# Patient Record
Sex: MALE | Race: WHITE | HISPANIC OR LATINO | Employment: OTHER | URBAN - METROPOLITAN AREA
[De-identification: names, ages, dates, MRNs, and addresses within clinical notes are randomized per-mention and may not be internally consistent; named-entity substitution may affect disease eponyms.]

---

## 2017-01-16 ENCOUNTER — ALLSCRIPTS OFFICE VISIT (OUTPATIENT)
Dept: OTHER | Facility: OTHER | Age: 57
End: 2017-01-16

## 2017-02-01 ENCOUNTER — ALLSCRIPTS OFFICE VISIT (OUTPATIENT)
Dept: OTHER | Facility: OTHER | Age: 57
End: 2017-02-01

## 2017-03-20 ENCOUNTER — ALLSCRIPTS OFFICE VISIT (OUTPATIENT)
Dept: OTHER | Facility: OTHER | Age: 57
End: 2017-03-20

## 2017-03-29 ENCOUNTER — ALLSCRIPTS OFFICE VISIT (OUTPATIENT)
Dept: OTHER | Facility: OTHER | Age: 57
End: 2017-03-29

## 2017-03-29 ENCOUNTER — TRANSCRIBE ORDERS (OUTPATIENT)
Dept: ADMINISTRATIVE | Facility: HOSPITAL | Age: 57
End: 2017-03-29

## 2017-03-29 DIAGNOSIS — E13.8 DIABETES MELLITUS OF OTHER TYPE WITH COMPLICATION: ICD-10-CM

## 2017-03-29 DIAGNOSIS — I25.119 ATHEROSCLEROSIS OF NATIVE CORONARY ARTERY WITH ANGINA PECTORIS, UNSPECIFIED WHETHER NATIVE OR TRANSPLANTED HEART (HCC): Primary | ICD-10-CM

## 2017-03-30 ENCOUNTER — APPOINTMENT (EMERGENCY)
Dept: RADIOLOGY | Facility: HOSPITAL | Age: 57
End: 2017-03-30
Payer: MEDICARE

## 2017-03-30 ENCOUNTER — HOSPITAL ENCOUNTER (EMERGENCY)
Facility: HOSPITAL | Age: 57
Discharge: HOME | End: 2017-03-30
Attending: EMERGENCY MEDICINE | Admitting: EMERGENCY MEDICINE
Payer: MEDICARE

## 2017-03-30 VITALS
WEIGHT: 248 LBS | SYSTOLIC BLOOD PRESSURE: 147 MMHG | TEMPERATURE: 97.8 F | HEIGHT: 66 IN | DIASTOLIC BLOOD PRESSURE: 82 MMHG | HEART RATE: 90 BPM | BODY MASS INDEX: 39.86 KG/M2 | RESPIRATION RATE: 20 BRPM | OXYGEN SATURATION: 96 %

## 2017-03-30 DIAGNOSIS — M54.9 BACK PAIN, CHRONIC: Primary | ICD-10-CM

## 2017-03-30 DIAGNOSIS — G89.29 BACK PAIN, CHRONIC: Primary | ICD-10-CM

## 2017-03-30 PROCEDURE — 96372 THER/PROPH/DIAG INJ SC/IM: CPT

## 2017-03-30 PROCEDURE — 72131 CT LUMBAR SPINE W/O DYE: CPT

## 2017-03-30 PROCEDURE — 99284 EMERGENCY DEPT VISIT MOD MDM: CPT

## 2017-03-30 RX ORDER — HYDROMORPHONE HCL 110MG/55ML
2 PATIENT CONTROLLED ANALGESIA SYRINGE INTRAVENOUS ONCE
Status: COMPLETED | OUTPATIENT
Start: 2017-03-30 | End: 2017-03-30

## 2017-03-30 RX ORDER — LOVASTATIN 40 MG/1
40 TABLET ORAL
COMMUNITY

## 2017-03-30 RX ORDER — OXYCODONE AND ACETAMINOPHEN 7.5; 325 MG/1; MG/1
1 TABLET ORAL EVERY 6 HOURS PRN
Status: ON HOLD | COMMUNITY
End: 2017-05-18 | Stop reason: ALTCHOICE

## 2017-03-30 RX ADMIN — HYDROMORPHONE HYDROCHLORIDE 2 MG: 2 INJECTION, SOLUTION INTRAMUSCULAR; INTRAVENOUS; SUBCUTANEOUS at 22:35

## 2017-04-27 ENCOUNTER — HOSPITAL ENCOUNTER (EMERGENCY)
Facility: HOSPITAL | Age: 57
Discharge: HOME | End: 2017-04-28
Attending: EMERGENCY MEDICINE
Payer: MEDICARE

## 2017-04-27 VITALS
HEIGHT: 66 IN | BODY MASS INDEX: 38.57 KG/M2 | WEIGHT: 240 LBS | HEART RATE: 91 BPM | RESPIRATION RATE: 24 BRPM | OXYGEN SATURATION: 95 % | SYSTOLIC BLOOD PRESSURE: 101 MMHG | TEMPERATURE: 99.9 F | DIASTOLIC BLOOD PRESSURE: 79 MMHG

## 2017-04-27 DIAGNOSIS — G89.29 BACK PAIN, CHRONIC: Primary | ICD-10-CM

## 2017-04-27 DIAGNOSIS — M54.9 BACK PAIN, CHRONIC: Primary | ICD-10-CM

## 2017-04-27 RX ORDER — HYDROMORPHONE HCL 110MG/55ML
2 PATIENT CONTROLLED ANALGESIA SYRINGE INTRAVENOUS ONCE
Status: COMPLETED | OUTPATIENT
Start: 2017-04-27 | End: 2017-04-28

## 2017-04-27 RX ORDER — GABAPENTIN 100 MG/1
100 CAPSULE ORAL ONCE
Status: COMPLETED | OUTPATIENT
Start: 2017-04-27 | End: 2017-04-28

## 2017-04-27 RX ORDER — GABAPENTIN 300 MG/1
300 CAPSULE ORAL 3 TIMES DAILY
Qty: 21 CAPSULE | Refills: 0 | Status: SHIPPED | OUTPATIENT
Start: 2017-04-27 | End: 2017-06-21

## 2017-04-28 PROCEDURE — 99283 EMERGENCY DEPT VISIT LOW MDM: CPT

## 2017-04-28 PROCEDURE — 96372 THER/PROPH/DIAG INJ SC/IM: CPT

## 2017-04-28 RX ADMIN — HYDROMORPHONE HYDROCHLORIDE 2 MG: 2 INJECTION, SOLUTION INTRAMUSCULAR; INTRAVENOUS; SUBCUTANEOUS at 00:00

## 2017-04-28 RX ADMIN — GABAPENTIN 100 MG: 100 CAPSULE ORAL at 00:00

## 2017-05-01 ENCOUNTER — APPOINTMENT (EMERGENCY)
Dept: RADIOLOGY | Facility: HOSPITAL | Age: 57
End: 2017-05-01
Payer: MEDICARE

## 2017-05-01 ENCOUNTER — HOSPITAL ENCOUNTER (EMERGENCY)
Facility: HOSPITAL | Age: 57
Discharge: HOME | End: 2017-05-01
Attending: EMERGENCY MEDICINE
Payer: MEDICARE

## 2017-05-01 VITALS
OXYGEN SATURATION: 97 % | BODY MASS INDEX: 37.77 KG/M2 | DIASTOLIC BLOOD PRESSURE: 82 MMHG | WEIGHT: 235 LBS | RESPIRATION RATE: 18 BRPM | HEIGHT: 66 IN | HEART RATE: 127 BPM | SYSTOLIC BLOOD PRESSURE: 118 MMHG | TEMPERATURE: 98.4 F

## 2017-05-01 DIAGNOSIS — R16.0 LIVER MASS: Primary | ICD-10-CM

## 2017-05-01 DIAGNOSIS — R91.1 PULMONARY NODULE: ICD-10-CM

## 2017-05-01 DIAGNOSIS — R10.9 FLANK PAIN: ICD-10-CM

## 2017-05-01 LAB
ALBUMIN SERPL BCP-MCNC: 3.5 G/DL (ref 3.5–5)
ALP SERPL-CCNC: 236 U/L (ref 46–116)
ALT SERPL W P-5'-P-CCNC: 139 U/L (ref 12–78)
ANION GAP SERPL CALCULATED.3IONS-SCNC: 14 MMOL/L (ref 4–13)
AST SERPL W P-5'-P-CCNC: 80 U/L (ref 5–45)
BACTERIA UR QL AUTO: ABNORMAL /HPF
BASOPHILS # BLD AUTO: 0.1 THOUSANDS/ΜL (ref 0–0.1)
BASOPHILS NFR BLD AUTO: 1 % (ref 0–1)
BILIRUB SERPL-MCNC: 0.5 MG/DL (ref 0.2–1)
BILIRUB UR QL STRIP: ABNORMAL
BUN SERPL-MCNC: 19 MG/DL (ref 5–25)
CALCIUM SERPL-MCNC: 9.9 MG/DL (ref 8.3–10.1)
CHLORIDE SERPL-SCNC: 98 MMOL/L (ref 100–108)
CLARITY UR: CLEAR
CO2 SERPL-SCNC: 26 MMOL/L (ref 21–32)
COLOR UR: ABNORMAL
CREAT SERPL-MCNC: 0.98 MG/DL (ref 0.6–1.3)
EOSINOPHIL # BLD AUTO: 0.4 THOUSAND/ΜL (ref 0–0.61)
EOSINOPHIL NFR BLD AUTO: 3 % (ref 0–6)
ERYTHROCYTE [DISTWIDTH] IN BLOOD BY AUTOMATED COUNT: 12.9 % (ref 11.6–15.1)
GFR SERPL CREATININE-BSD FRML MDRD: >60 ML/MIN/1.73SQ M
GLUCOSE SERPL-MCNC: 179 MG/DL (ref 65–140)
GLUCOSE UR STRIP-MCNC: ABNORMAL MG/DL
HCT VFR BLD AUTO: 48.4 % (ref 42–52)
HGB BLD-MCNC: 15.6 G/DL (ref 14–18)
HGB UR QL STRIP.AUTO: NEGATIVE
KETONES UR STRIP-MCNC: ABNORMAL MG/DL
LEUKOCYTE ESTERASE UR QL STRIP: NEGATIVE
LIPASE SERPL-CCNC: 184 U/L (ref 73–393)
LYMPHOCYTES # BLD AUTO: 3 THOUSANDS/ΜL (ref 0.6–4.47)
LYMPHOCYTES NFR BLD AUTO: 24 % (ref 14–44)
MCH RBC QN AUTO: 28.6 PG (ref 27–31)
MCHC RBC AUTO-ENTMCNC: 32.3 G/DL (ref 31.4–37.4)
MCV RBC AUTO: 89 FL (ref 82–98)
MONOCYTES # BLD AUTO: 1.2 THOUSAND/ΜL (ref 0.17–1.22)
MONOCYTES NFR BLD AUTO: 9 % (ref 4–12)
MUCOUS THREADS UR QL AUTO: ABNORMAL
NEUTROPHILS # BLD AUTO: 8.1 THOUSANDS/ΜL (ref 1.85–7.62)
NEUTS SEG NFR BLD AUTO: 64 % (ref 43–75)
NITRITE UR QL STRIP: NEGATIVE
NON-SQ EPI CELLS URNS QL MICRO: ABNORMAL /HPF
NRBC BLD AUTO-RTO: 0 /100 WBCS
OTHER STN SPEC: ABNORMAL
PH UR STRIP.AUTO: 6 [PH] (ref 5–9)
PLATELET # BLD AUTO: 400 THOUSANDS/UL (ref 130–400)
PMV BLD AUTO: 7.1 FL (ref 8.9–12.7)
POTASSIUM SERPL-SCNC: 3.9 MMOL/L (ref 3.5–5.3)
PROT SERPL-MCNC: 7.7 G/DL (ref 6.4–8.2)
PROT UR STRIP-MCNC: ABNORMAL MG/DL
RBC # BLD AUTO: 5.46 MILLION/UL (ref 4.7–6.1)
RBC #/AREA URNS AUTO: ABNORMAL /HPF
SODIUM SERPL-SCNC: 138 MMOL/L (ref 136–145)
SP GR UR STRIP.AUTO: 1.02 (ref 1–1.03)
TROPONIN I SERPL-MCNC: <0.02 NG/ML
UROBILINOGEN UR QL STRIP.AUTO: 4 E.U./DL
WBC # BLD AUTO: 12.6 THOUSAND/UL (ref 4.8–10.8)
WBC #/AREA URNS AUTO: ABNORMAL /HPF

## 2017-05-01 PROCEDURE — 99284 EMERGENCY DEPT VISIT MOD MDM: CPT

## 2017-05-01 PROCEDURE — 36415 COLL VENOUS BLD VENIPUNCTURE: CPT | Performed by: EMERGENCY MEDICINE

## 2017-05-01 PROCEDURE — 96374 THER/PROPH/DIAG INJ IV PUSH: CPT

## 2017-05-01 PROCEDURE — 96361 HYDRATE IV INFUSION ADD-ON: CPT

## 2017-05-01 PROCEDURE — 93005 ELECTROCARDIOGRAM TRACING: CPT | Performed by: EMERGENCY MEDICINE

## 2017-05-01 PROCEDURE — 83690 ASSAY OF LIPASE: CPT | Performed by: EMERGENCY MEDICINE

## 2017-05-01 PROCEDURE — 71275 CT ANGIOGRAPHY CHEST: CPT

## 2017-05-01 PROCEDURE — 96375 TX/PRO/DX INJ NEW DRUG ADDON: CPT

## 2017-05-01 PROCEDURE — 74176 CT ABD & PELVIS W/O CONTRAST: CPT

## 2017-05-01 PROCEDURE — 80053 COMPREHEN METABOLIC PANEL: CPT | Performed by: EMERGENCY MEDICINE

## 2017-05-01 PROCEDURE — 84484 ASSAY OF TROPONIN QUANT: CPT | Performed by: EMERGENCY MEDICINE

## 2017-05-01 PROCEDURE — 81001 URINALYSIS AUTO W/SCOPE: CPT | Performed by: EMERGENCY MEDICINE

## 2017-05-01 PROCEDURE — 85025 COMPLETE CBC W/AUTO DIFF WBC: CPT | Performed by: EMERGENCY MEDICINE

## 2017-05-01 RX ORDER — KETOROLAC TROMETHAMINE 30 MG/ML
30 INJECTION, SOLUTION INTRAMUSCULAR; INTRAVENOUS ONCE
Status: COMPLETED | OUTPATIENT
Start: 2017-05-01 | End: 2017-05-01

## 2017-05-01 RX ORDER — LEVOFLOXACIN 500 MG/1
500 TABLET, FILM COATED ORAL DAILY
Qty: 10 TABLET | Refills: 0 | Status: SHIPPED | OUTPATIENT
Start: 2017-05-01 | End: 2017-05-11

## 2017-05-01 RX ORDER — ONDANSETRON 2 MG/ML
4 INJECTION INTRAMUSCULAR; INTRAVENOUS ONCE
Status: COMPLETED | OUTPATIENT
Start: 2017-05-01 | End: 2017-05-01

## 2017-05-01 RX ADMIN — IOHEXOL 85 ML: 350 INJECTION, SOLUTION INTRAVENOUS at 06:30

## 2017-05-01 RX ADMIN — SODIUM CHLORIDE 1000 ML: 0.9 INJECTION, SOLUTION INTRAVENOUS at 03:40

## 2017-05-01 RX ADMIN — ONDANSETRON 4 MG: 2 INJECTION INTRAMUSCULAR; INTRAVENOUS at 03:41

## 2017-05-01 RX ADMIN — KETOROLAC TROMETHAMINE 30 MG: 30 INJECTION, SOLUTION INTRAMUSCULAR at 03:41

## 2017-05-01 RX ADMIN — HYDROMORPHONE HYDROCHLORIDE 1 MG: 1 INJECTION, SOLUTION INTRAMUSCULAR; INTRAVENOUS; SUBCUTANEOUS at 03:41

## 2017-05-02 ENCOUNTER — ALLSCRIPTS OFFICE VISIT (OUTPATIENT)
Dept: OTHER | Facility: OTHER | Age: 57
End: 2017-05-02

## 2017-05-05 LAB
ATRIAL RATE: 122 BPM
P AXIS: 46 DEGREES
PR INTERVAL: 118 MS
QRS AXIS: 27 DEGREES
QRSD INTERVAL: 90 MS
QT INTERVAL: 328 MS
QTC INTERVAL: 467 MS
T WAVE AXIS: 33 DEGREES
VENTRICULAR RATE: 122 BPM

## 2017-05-09 ENCOUNTER — ALLSCRIPTS OFFICE VISIT (OUTPATIENT)
Dept: OTHER | Facility: OTHER | Age: 57
End: 2017-05-09

## 2017-05-11 ENCOUNTER — GENERIC CONVERSION - ENCOUNTER (OUTPATIENT)
Dept: OTHER | Facility: OTHER | Age: 57
End: 2017-05-11

## 2017-05-17 ENCOUNTER — ALLSCRIPTS OFFICE VISIT (OUTPATIENT)
Dept: OTHER | Facility: OTHER | Age: 57
End: 2017-05-17

## 2017-05-18 ENCOUNTER — HOSPITAL ENCOUNTER (INPATIENT)
Facility: HOSPITAL | Age: 57
LOS: 1 days | Discharge: HOME | DRG: 948 | End: 2017-05-19
Attending: EMERGENCY MEDICINE | Admitting: FAMILY MEDICINE
Payer: MEDICARE

## 2017-05-18 ENCOUNTER — APPOINTMENT (EMERGENCY)
Dept: RADIOLOGY | Facility: HOSPITAL | Age: 57
DRG: 948 | End: 2017-05-18
Payer: MEDICARE

## 2017-05-18 DIAGNOSIS — R07.9 CHEST PAIN: Primary | ICD-10-CM

## 2017-05-18 DIAGNOSIS — R16.0 MASS OF MULTIPLE SITES OF LIVER: ICD-10-CM

## 2017-05-18 PROBLEM — M48.00 SPINAL STENOSIS: Status: ACTIVE | Noted: 2017-05-18

## 2017-05-18 PROBLEM — Z72.0 TOBACCO USE: Status: ACTIVE | Noted: 2017-05-18

## 2017-05-18 PROBLEM — F32.A ANXIETY AND DEPRESSION: Status: ACTIVE | Noted: 2017-05-18

## 2017-05-18 PROBLEM — R10.11 RUQ PAIN: Status: ACTIVE | Noted: 2017-05-18

## 2017-05-18 PROBLEM — F41.9 ANXIETY AND DEPRESSION: Status: ACTIVE | Noted: 2017-05-18

## 2017-05-18 PROBLEM — K21.9 GERD (GASTROESOPHAGEAL REFLUX DISEASE): Status: ACTIVE | Noted: 2017-05-18

## 2017-05-18 PROBLEM — N40.0 BPH (BENIGN PROSTATIC HYPERPLASIA): Status: ACTIVE | Noted: 2017-05-18

## 2017-05-18 PROBLEM — R91.8 MULTIPLE LUNG NODULES ON CT: Status: ACTIVE | Noted: 2017-05-18

## 2017-05-18 LAB
ANION GAP SERPL CALCULATED.3IONS-SCNC: 12 MMOL/L (ref 4–13)
ANION GAP SERPL CALCULATED.3IONS-SCNC: 9 MMOL/L (ref 4–13)
BASOPHILS # BLD AUTO: 0 THOUSANDS/ΜL (ref 0–0.1)
BASOPHILS NFR BLD AUTO: 0 % (ref 0–1)
BUN SERPL-MCNC: 11 MG/DL (ref 5–25)
BUN SERPL-MCNC: 9 MG/DL (ref 5–25)
CALCIUM SERPL-MCNC: 8.7 MG/DL (ref 8.3–10.1)
CALCIUM SERPL-MCNC: 8.8 MG/DL (ref 8.3–10.1)
CHLORIDE SERPL-SCNC: 102 MMOL/L (ref 100–108)
CHLORIDE SERPL-SCNC: 103 MMOL/L (ref 100–108)
CO2 SERPL-SCNC: 26 MMOL/L (ref 21–32)
CO2 SERPL-SCNC: 29 MMOL/L (ref 21–32)
CREAT SERPL-MCNC: 0.65 MG/DL (ref 0.6–1.3)
CREAT SERPL-MCNC: 0.74 MG/DL (ref 0.6–1.3)
EOSINOPHIL # BLD AUTO: 0.4 THOUSAND/ΜL (ref 0–0.61)
EOSINOPHIL NFR BLD AUTO: 4 % (ref 0–6)
ERYTHROCYTE [DISTWIDTH] IN BLOOD BY AUTOMATED COUNT: 13.3 % (ref 11.6–15.1)
ERYTHROCYTE [DISTWIDTH] IN BLOOD BY AUTOMATED COUNT: 13.3 % (ref 11.6–15.1)
EST. AVERAGE GLUCOSE BLD GHB EST-MCNC: 206 MG/DL
EXT BLOOD URINE: NORMAL
EXT GLUCOSE, UA: NORMAL
EXT KETONES: NORMAL
EXT PH, UA: 7
EXT PROTEIN, UA: NORMAL
GFR SERPL CREATININE-BSD FRML MDRD: >60 ML/MIN/1.73SQ M
GFR SERPL CREATININE-BSD FRML MDRD: >60 ML/MIN/1.73SQ M
GLUCOSE SERPL-MCNC: 105 MG/DL (ref 65–140)
GLUCOSE SERPL-MCNC: 169 MG/DL (ref 65–140)
GLUCOSE SERPL-MCNC: 205 MG/DL (ref 65–140)
GLUCOSE SERPL-MCNC: 95 MG/DL (ref 65–140)
HBA1C MFR BLD: 8.8 % (ref 4.2–6.3)
HCT VFR BLD AUTO: 37.2 % (ref 42–52)
HCT VFR BLD AUTO: 38 % (ref 42–52)
HGB BLD-MCNC: 12.1 G/DL (ref 14–18)
HGB BLD-MCNC: 12.3 G/DL (ref 14–18)
LYMPHOCYTES # BLD AUTO: 2.3 THOUSANDS/ΜL (ref 0.6–4.47)
LYMPHOCYTES NFR BLD AUTO: 26 % (ref 14–44)
MAGNESIUM SERPL-MCNC: 1.7 MG/DL (ref 1.6–2.6)
MCH RBC QN AUTO: 28.2 PG (ref 27–31)
MCH RBC QN AUTO: 28.5 PG (ref 27–31)
MCHC RBC AUTO-ENTMCNC: 32.4 G/DL (ref 31.4–37.4)
MCHC RBC AUTO-ENTMCNC: 32.5 G/DL (ref 31.4–37.4)
MCV RBC AUTO: 87 FL (ref 82–98)
MCV RBC AUTO: 88 FL (ref 82–98)
MONOCYTES # BLD AUTO: 0.8 THOUSAND/ΜL (ref 0.17–1.22)
MONOCYTES NFR BLD AUTO: 9 % (ref 4–12)
NEUTROPHILS # BLD AUTO: 5.6 THOUSANDS/ΜL (ref 1.85–7.62)
NEUTS SEG NFR BLD AUTO: 61 % (ref 43–75)
NRBC BLD AUTO-RTO: 0 /100 WBCS
NT-PROBNP SERPL-MCNC: 64 PG/ML
PHOSPHATE SERPL-MCNC: 3.6 MG/DL (ref 2.7–4.5)
PLATELET # BLD AUTO: 281 THOUSANDS/UL (ref 130–400)
PLATELET # BLD AUTO: 288 THOUSANDS/UL (ref 130–400)
PMV BLD AUTO: 7.1 FL (ref 8.9–12.7)
PMV BLD AUTO: 7.2 FL (ref 8.9–12.7)
POTASSIUM SERPL-SCNC: 3.8 MMOL/L (ref 3.5–5.3)
POTASSIUM SERPL-SCNC: 3.9 MMOL/L (ref 3.5–5.3)
RBC # BLD AUTO: 4.25 MILLION/UL (ref 4.7–6.1)
RBC # BLD AUTO: 4.36 MILLION/UL (ref 4.7–6.1)
SODIUM SERPL-SCNC: 140 MMOL/L (ref 136–145)
SODIUM SERPL-SCNC: 141 MMOL/L (ref 136–145)
TROPONIN I SERPL-MCNC: <0.02 NG/ML
WBC # BLD AUTO: 9.1 THOUSAND/UL (ref 4.8–10.8)
WBC # BLD AUTO: 9.1 THOUSAND/UL (ref 4.8–10.8)
WBC # BLD EST: NORMAL 10*3/UL

## 2017-05-18 PROCEDURE — 83880 ASSAY OF NATRIURETIC PEPTIDE: CPT | Performed by: EMERGENCY MEDICINE

## 2017-05-18 PROCEDURE — 84484 ASSAY OF TROPONIN QUANT: CPT | Performed by: EMERGENCY MEDICINE

## 2017-05-18 PROCEDURE — 80048 BASIC METABOLIC PNL TOTAL CA: CPT | Performed by: FAMILY MEDICINE

## 2017-05-18 PROCEDURE — 80048 BASIC METABOLIC PNL TOTAL CA: CPT | Performed by: EMERGENCY MEDICINE

## 2017-05-18 PROCEDURE — 85025 COMPLETE CBC W/AUTO DIFF WBC: CPT | Performed by: EMERGENCY MEDICINE

## 2017-05-18 PROCEDURE — 83735 ASSAY OF MAGNESIUM: CPT | Performed by: FAMILY MEDICINE

## 2017-05-18 PROCEDURE — 94660 CPAP INITIATION&MGMT: CPT

## 2017-05-18 PROCEDURE — 96374 THER/PROPH/DIAG INJ IV PUSH: CPT

## 2017-05-18 PROCEDURE — 85027 COMPLETE CBC AUTOMATED: CPT | Performed by: FAMILY MEDICINE

## 2017-05-18 PROCEDURE — 83036 HEMOGLOBIN GLYCOSYLATED A1C: CPT | Performed by: FAMILY MEDICINE

## 2017-05-18 PROCEDURE — 71010 HB CHEST X-RAY 1 VIEW FRONTAL (PORTABLE): CPT

## 2017-05-18 PROCEDURE — 81002 URINALYSIS NONAUTO W/O SCOPE: CPT | Performed by: EMERGENCY MEDICINE

## 2017-05-18 PROCEDURE — 94760 N-INVAS EAR/PLS OXIMETRY 1: CPT

## 2017-05-18 PROCEDURE — 99285 EMERGENCY DEPT VISIT HI MDM: CPT

## 2017-05-18 PROCEDURE — 36415 COLL VENOUS BLD VENIPUNCTURE: CPT | Performed by: EMERGENCY MEDICINE

## 2017-05-18 PROCEDURE — 84484 ASSAY OF TROPONIN QUANT: CPT | Performed by: FAMILY MEDICINE

## 2017-05-18 PROCEDURE — 87081 CULTURE SCREEN ONLY: CPT | Performed by: FAMILY MEDICINE

## 2017-05-18 PROCEDURE — 84100 ASSAY OF PHOSPHORUS: CPT | Performed by: FAMILY MEDICINE

## 2017-05-18 PROCEDURE — 82948 REAGENT STRIP/BLOOD GLUCOSE: CPT

## 2017-05-18 PROCEDURE — 93005 ELECTROCARDIOGRAM TRACING: CPT

## 2017-05-18 PROCEDURE — 71275 CT ANGIOGRAPHY CHEST: CPT

## 2017-05-18 RX ORDER — ALBUTEROL SULFATE 90 UG/1
1 AEROSOL, METERED RESPIRATORY (INHALATION) EVERY 4 HOURS PRN
Status: DISCONTINUED | OUTPATIENT
Start: 2017-05-18 | End: 2017-05-18

## 2017-05-18 RX ORDER — OXYCODONE HYDROCHLORIDE 5 MG/1
7.25 TABLET ORAL EVERY 8 HOURS PRN
Status: DISCONTINUED | OUTPATIENT
Start: 2017-05-18 | End: 2017-05-18 | Stop reason: CLARIF

## 2017-05-18 RX ORDER — MORPHINE SULFATE 10 MG/ML
6 INJECTION, SOLUTION INTRAMUSCULAR; INTRAVENOUS ONCE
Status: COMPLETED | OUTPATIENT
Start: 2017-05-18 | End: 2017-05-18

## 2017-05-18 RX ORDER — CLOPIDOGREL BISULFATE 75 MG/1
75 TABLET ORAL DAILY
Status: DISCONTINUED | OUTPATIENT
Start: 2017-05-18 | End: 2017-05-19 | Stop reason: HOSPADM

## 2017-05-18 RX ORDER — GLIPIZIDE 5 MG/1
10 TABLET ORAL
Status: DISCONTINUED | OUTPATIENT
Start: 2017-05-18 | End: 2017-05-19 | Stop reason: HOSPADM

## 2017-05-18 RX ORDER — NITROGLYCERIN 0.4 MG/1
0.4 TABLET SUBLINGUAL
Status: DISCONTINUED | OUTPATIENT
Start: 2017-05-18 | End: 2017-05-19 | Stop reason: HOSPADM

## 2017-05-18 RX ORDER — PRAVASTATIN SODIUM 40 MG
40 TABLET ORAL
Status: DISCONTINUED | OUTPATIENT
Start: 2017-05-18 | End: 2017-05-19 | Stop reason: HOSPADM

## 2017-05-18 RX ORDER — OXYCODONE HYDROCHLORIDE 5 MG/1
5 CAPSULE ORAL EVERY 4 HOURS PRN
Status: ON HOLD | COMMUNITY
End: 2017-05-19

## 2017-05-18 RX ORDER — OXYCODONE HYDROCHLORIDE 5 MG/1
7.5 TABLET ORAL EVERY 8 HOURS PRN
Status: DISCONTINUED | OUTPATIENT
Start: 2017-05-18 | End: 2017-05-19 | Stop reason: HOSPADM

## 2017-05-18 RX ORDER — FAMOTIDINE 20 MG/1
40 TABLET, FILM COATED ORAL DAILY
Status: DISCONTINUED | OUTPATIENT
Start: 2017-05-18 | End: 2017-05-19 | Stop reason: HOSPADM

## 2017-05-18 RX ORDER — TAMSULOSIN HYDROCHLORIDE 0.4 MG/1
0.4 CAPSULE ORAL
Status: DISCONTINUED | OUTPATIENT
Start: 2017-05-18 | End: 2017-05-19 | Stop reason: HOSPADM

## 2017-05-18 RX ORDER — ALBUTEROL SULFATE 2.5 MG/3ML
2.5 SOLUTION RESPIRATORY (INHALATION) EVERY 4 HOURS PRN
Status: DISCONTINUED | OUTPATIENT
Start: 2017-05-18 | End: 2017-05-19 | Stop reason: HOSPADM

## 2017-05-18 RX ORDER — PAROXETINE HYDROCHLORIDE 20 MG/1
30 TABLET, FILM COATED ORAL DAILY
Status: DISCONTINUED | OUTPATIENT
Start: 2017-05-18 | End: 2017-05-19 | Stop reason: HOSPADM

## 2017-05-18 RX ORDER — OXYCODONE HYDROCHLORIDE 5 MG/1
2.5 TABLET ORAL EVERY 6 HOURS PRN
Status: DISCONTINUED | OUTPATIENT
Start: 2017-05-18 | End: 2017-05-18

## 2017-05-18 RX ORDER — MORPHINE SULFATE 2 MG/ML
1 INJECTION, SOLUTION INTRAMUSCULAR; INTRAVENOUS ONCE
Status: COMPLETED | OUTPATIENT
Start: 2017-05-18 | End: 2017-05-18

## 2017-05-18 RX ORDER — TAMSULOSIN HYDROCHLORIDE 0.4 MG/1
0.4 CAPSULE ORAL EVERY MORNING
COMMUNITY

## 2017-05-18 RX ADMIN — ENOXAPARIN SODIUM 40 MG: 40 INJECTION SUBCUTANEOUS at 08:53

## 2017-05-18 RX ADMIN — OXYCODONE HYDROCHLORIDE 7.5 MG: 5 TABLET ORAL at 04:19

## 2017-05-18 RX ADMIN — INSULIN DETEMIR 40 UNITS: 100 INJECTION, SOLUTION SUBCUTANEOUS at 21:48

## 2017-05-18 RX ADMIN — GLIPIZIDE 10 MG: 5 TABLET ORAL at 08:52

## 2017-05-18 RX ADMIN — IOHEXOL 85 ML: 350 INJECTION, SOLUTION INTRAVENOUS at 01:58

## 2017-05-18 RX ADMIN — OXYCODONE HYDROCHLORIDE 2.5 MG: 5 TABLET ORAL at 18:50

## 2017-05-18 RX ADMIN — PRAVASTATIN SODIUM 40 MG: 40 TABLET ORAL at 17:20

## 2017-05-18 RX ADMIN — PAROXETINE HYDROCHLORIDE 30 MG: 20 TABLET, FILM COATED ORAL at 08:52

## 2017-05-18 RX ADMIN — MORPHINE SULFATE 1 MG: 2 INJECTION, SOLUTION INTRAMUSCULAR; INTRAVENOUS at 19:04

## 2017-05-18 RX ADMIN — TAMSULOSIN HYDROCHLORIDE 0.4 MG: 0.4 CAPSULE ORAL at 17:20

## 2017-05-18 RX ADMIN — OXYCODONE HYDROCHLORIDE 7.5 MG: 5 TABLET ORAL at 13:31

## 2017-05-18 RX ADMIN — FAMOTIDINE 40 MG: 20 TABLET ORAL at 08:52

## 2017-05-18 RX ADMIN — MORPHINE SULFATE 6 MG: 10 INJECTION, SOLUTION INTRAMUSCULAR; INTRAVENOUS at 00:49

## 2017-05-18 RX ADMIN — OXYCODONE HYDROCHLORIDE 7.5 MG: 5 TABLET ORAL at 21:48

## 2017-05-18 RX ADMIN — GLIPIZIDE 10 MG: 5 TABLET ORAL at 17:20

## 2017-05-18 RX ADMIN — CLOPIDOGREL BISULFATE 75 MG: 75 TABLET ORAL at 08:52

## 2017-05-19 ENCOUNTER — APPOINTMENT (INPATIENT)
Dept: NON INVASIVE DIAGNOSTICS | Facility: HOSPITAL | Age: 57
DRG: 948 | End: 2017-05-19
Payer: MEDICARE

## 2017-05-19 ENCOUNTER — APPOINTMENT (INPATIENT)
Dept: RADIOLOGY | Facility: HOSPITAL | Age: 57
DRG: 948 | End: 2017-05-19
Payer: MEDICARE

## 2017-05-19 VITALS
WEIGHT: 248 LBS | SYSTOLIC BLOOD PRESSURE: 133 MMHG | TEMPERATURE: 99.1 F | DIASTOLIC BLOOD PRESSURE: 71 MMHG | OXYGEN SATURATION: 92 % | HEART RATE: 109 BPM | BODY MASS INDEX: 39.86 KG/M2 | RESPIRATION RATE: 18 BRPM | HEIGHT: 66 IN

## 2017-05-19 LAB
ANION GAP SERPL CALCULATED.3IONS-SCNC: 10 MMOL/L (ref 4–13)
BASOPHILS # BLD AUTO: 0 THOUSANDS/ΜL (ref 0–0.1)
BASOPHILS NFR BLD AUTO: 0 % (ref 0–1)
BUN SERPL-MCNC: 6 MG/DL (ref 5–25)
CALCIUM SERPL-MCNC: 9.4 MG/DL (ref 8.3–10.1)
CEA SERPL-MCNC: 90.2 NG/ML (ref 0–3)
CHLORIDE SERPL-SCNC: 100 MMOL/L (ref 100–108)
CO2 SERPL-SCNC: 29 MMOL/L (ref 21–32)
CREAT SERPL-MCNC: 0.65 MG/DL (ref 0.6–1.3)
EOSINOPHIL # BLD AUTO: 0.3 THOUSAND/ΜL (ref 0–0.61)
EOSINOPHIL NFR BLD AUTO: 4 % (ref 0–6)
ERYTHROCYTE [DISTWIDTH] IN BLOOD BY AUTOMATED COUNT: 13 % (ref 11.6–15.1)
GFR SERPL CREATININE-BSD FRML MDRD: >60 ML/MIN/1.73SQ M
GLUCOSE SERPL-MCNC: 186 MG/DL (ref 65–140)
GLUCOSE SERPL-MCNC: 201 MG/DL (ref 65–140)
GLUCOSE SERPL-MCNC: 204 MG/DL (ref 65–140)
GLUCOSE SERPL-MCNC: 205 MG/DL (ref 65–140)
GLUCOSE SERPL-MCNC: 209 MG/DL (ref 65–140)
HCT VFR BLD AUTO: 38.9 % (ref 42–52)
HGB BLD-MCNC: 12.9 G/DL (ref 14–18)
LYMPHOCYTES # BLD AUTO: 1.5 THOUSANDS/ΜL (ref 0.6–4.47)
LYMPHOCYTES NFR BLD AUTO: 18 % (ref 14–44)
MAGNESIUM SERPL-MCNC: 1.6 MG/DL (ref 1.6–2.6)
MAX DIASTOLIC BP: 74 MMHG
MAX HEART RATE: 133 BPM
MAX PREDICTED HEART RATE: 164 BPM
MAX. SYSTOLIC BP: 130 MMHG
MCH RBC QN AUTO: 28.7 PG (ref 27–31)
MCHC RBC AUTO-ENTMCNC: 33.1 G/DL (ref 31.4–37.4)
MCV RBC AUTO: 87 FL (ref 82–98)
MONOCYTES # BLD AUTO: 0.8 THOUSAND/ΜL (ref 0.17–1.22)
MONOCYTES NFR BLD AUTO: 9 % (ref 4–12)
MRSA NOSE QL CULT: NORMAL
NEUTROPHILS # BLD AUTO: 5.7 THOUSANDS/ΜL (ref 1.85–7.62)
NEUTS SEG NFR BLD AUTO: 69 % (ref 43–75)
NRBC BLD AUTO-RTO: 0 /100 WBCS
PHOSPHATE SERPL-MCNC: 3.7 MG/DL (ref 2.7–4.5)
PLATELET # BLD AUTO: 274 THOUSANDS/UL (ref 130–400)
PMV BLD AUTO: 7.6 FL (ref 8.9–12.7)
POTASSIUM SERPL-SCNC: 3.8 MMOL/L (ref 3.5–5.3)
PROTOCOL NAME: NORMAL
RBC # BLD AUTO: 4.49 MILLION/UL (ref 4.7–6.1)
SODIUM SERPL-SCNC: 139 MMOL/L (ref 136–145)
TIME IN EXERCISE PHASE: 60 S
WBC # BLD AUTO: 8.3 THOUSAND/UL (ref 4.8–10.8)

## 2017-05-19 PROCEDURE — A9502 TC99M TETROFOSMIN: HCPCS

## 2017-05-19 PROCEDURE — 82378 CARCINOEMBRYONIC ANTIGEN: CPT | Performed by: FAMILY MEDICINE

## 2017-05-19 PROCEDURE — 93017 CV STRESS TEST TRACING ONLY: CPT

## 2017-05-19 PROCEDURE — 78452 HT MUSCLE IMAGE SPECT MULT: CPT

## 2017-05-19 PROCEDURE — 83735 ASSAY OF MAGNESIUM: CPT | Performed by: FAMILY MEDICINE

## 2017-05-19 PROCEDURE — 84100 ASSAY OF PHOSPHORUS: CPT | Performed by: FAMILY MEDICINE

## 2017-05-19 PROCEDURE — 94760 N-INVAS EAR/PLS OXIMETRY 1: CPT

## 2017-05-19 PROCEDURE — 80048 BASIC METABOLIC PNL TOTAL CA: CPT | Performed by: FAMILY MEDICINE

## 2017-05-19 PROCEDURE — 85025 COMPLETE CBC W/AUTO DIFF WBC: CPT | Performed by: FAMILY MEDICINE

## 2017-05-19 PROCEDURE — 82948 REAGENT STRIP/BLOOD GLUCOSE: CPT

## 2017-05-19 PROCEDURE — 93005 ELECTROCARDIOGRAM TRACING: CPT | Performed by: FAMILY MEDICINE

## 2017-05-19 RX ORDER — OXYCODONE HYDROCHLORIDE 5 MG/1
CAPSULE ORAL
Qty: 84 CAPSULE | Refills: 0 | Status: SHIPPED | OUTPATIENT
Start: 2017-05-19 | End: 2018-01-04 | Stop reason: HOSPADM

## 2017-05-19 RX ORDER — IBUPROFEN 400 MG/1
400 TABLET ORAL EVERY 6 HOURS PRN
Status: DISCONTINUED | OUTPATIENT
Start: 2017-05-19 | End: 2017-05-19 | Stop reason: HOSPADM

## 2017-05-19 RX ORDER — ISOSORBIDE MONONITRATE 30 MG/1
30 TABLET, EXTENDED RELEASE ORAL DAILY
Qty: 30 TABLET | Refills: 0 | Status: SHIPPED | OUTPATIENT
Start: 2017-05-19 | End: 2017-10-30

## 2017-05-19 RX ADMIN — FAMOTIDINE 40 MG: 20 TABLET ORAL at 08:08

## 2017-05-19 RX ADMIN — PAROXETINE HYDROCHLORIDE 30 MG: 20 TABLET, FILM COATED ORAL at 08:08

## 2017-05-19 RX ADMIN — OXYCODONE HYDROCHLORIDE 7.5 MG: 5 TABLET ORAL at 14:37

## 2017-05-19 RX ADMIN — OXYCODONE HYDROCHLORIDE 7.5 MG: 5 TABLET ORAL at 05:58

## 2017-05-19 RX ADMIN — REGADENOSON 0.4 MG: 0.08 INJECTION, SOLUTION INTRAVENOUS at 10:28

## 2017-05-19 RX ADMIN — PRAVASTATIN SODIUM 40 MG: 40 TABLET ORAL at 17:10

## 2017-05-19 RX ADMIN — GLIPIZIDE 10 MG: 5 TABLET ORAL at 17:10

## 2017-05-19 RX ADMIN — CLOPIDOGREL BISULFATE 75 MG: 75 TABLET ORAL at 08:08

## 2017-05-19 RX ADMIN — OXYCODONE HYDROCHLORIDE 7.5 MG: 5 TABLET ORAL at 21:26

## 2017-05-19 RX ADMIN — ENOXAPARIN SODIUM 40 MG: 40 INJECTION SUBCUTANEOUS at 08:07

## 2017-05-19 RX ADMIN — TAMSULOSIN HYDROCHLORIDE 0.4 MG: 0.4 CAPSULE ORAL at 17:10

## 2017-05-21 LAB
ATRIAL RATE: 93 BPM
ATRIAL RATE: 97 BPM
P AXIS: 48 DEGREES
P AXIS: 51 DEGREES
PR INTERVAL: 122 MS
PR INTERVAL: 130 MS
QRS AXIS: 36 DEGREES
QRS AXIS: 47 DEGREES
QRSD INTERVAL: 90 MS
QRSD INTERVAL: 94 MS
QT INTERVAL: 342 MS
QT INTERVAL: 352 MS
QTC INTERVAL: 434 MS
QTC INTERVAL: 437 MS
T WAVE AXIS: 31 DEGREES
T WAVE AXIS: 44 DEGREES
VENTRICULAR RATE: 93 BPM
VENTRICULAR RATE: 97 BPM

## 2017-05-22 ENCOUNTER — GENERIC CONVERSION - ENCOUNTER (OUTPATIENT)
Dept: OTHER | Facility: OTHER | Age: 57
End: 2017-05-22

## 2017-05-22 RX ORDER — OMEPRAZOLE 40 MG/1
40 CAPSULE, DELAYED RELEASE ORAL EVERY MORNING
Status: ON HOLD | COMMUNITY
End: 2017-10-30 | Stop reason: ALTCHOICE

## 2017-05-22 RX ORDER — LISINOPRIL 10 MG/1
10 TABLET ORAL EVERY MORNING
COMMUNITY

## 2017-05-23 ENCOUNTER — ANESTHESIA EVENT (OUTPATIENT)
Dept: GASTROENTEROLOGY | Facility: AMBULARY SURGERY CENTER | Age: 57
End: 2017-05-23
Payer: MEDICARE

## 2017-05-23 ENCOUNTER — HOSPITAL ENCOUNTER (OUTPATIENT)
Facility: AMBULARY SURGERY CENTER | Age: 57
Setting detail: OUTPATIENT SURGERY
Discharge: HOME | End: 2017-05-23
Attending: INTERNAL MEDICINE | Admitting: INTERNAL MEDICINE
Payer: MEDICARE

## 2017-05-23 ENCOUNTER — GENERIC CONVERSION - ENCOUNTER (OUTPATIENT)
Dept: OTHER | Facility: OTHER | Age: 57
End: 2017-05-23

## 2017-05-23 VITALS
WEIGHT: 248 LBS | SYSTOLIC BLOOD PRESSURE: 116 MMHG | HEIGHT: 66 IN | RESPIRATION RATE: 18 BRPM | OXYGEN SATURATION: 99 % | HEART RATE: 81 BPM | TEMPERATURE: 98.6 F | DIASTOLIC BLOOD PRESSURE: 73 MMHG | BODY MASS INDEX: 39.86 KG/M2

## 2017-05-23 DIAGNOSIS — R13.10 DYSPHAGIA: ICD-10-CM

## 2017-05-23 DIAGNOSIS — D37.4 NEOPLASM OF UNCERTAIN BEHAVIOR OF COLON: ICD-10-CM

## 2017-05-23 PROCEDURE — 88342 IMHCHEM/IMCYTCHM 1ST ANTB: CPT | Performed by: INTERNAL MEDICINE

## 2017-05-23 PROCEDURE — 88305 TISSUE EXAM BY PATHOLOGIST: CPT | Performed by: INTERNAL MEDICINE

## 2017-05-23 PROCEDURE — 88341 IMHCHEM/IMCYTCHM EA ADD ANTB: CPT | Performed by: INTERNAL MEDICINE

## 2017-05-23 RX ORDER — SODIUM CHLORIDE 9 MG/ML
50 INJECTION, SOLUTION INTRAVENOUS CONTINUOUS
Status: CANCELLED | OUTPATIENT
Start: 2017-05-24

## 2017-05-23 RX ORDER — SODIUM CHLORIDE, SODIUM LACTATE, POTASSIUM CHLORIDE, CALCIUM CHLORIDE 600; 310; 30; 20 MG/100ML; MG/100ML; MG/100ML; MG/100ML
100 INJECTION, SOLUTION INTRAVENOUS CONTINUOUS
Status: DISCONTINUED | OUTPATIENT
Start: 2017-05-23 | End: 2017-05-23 | Stop reason: HOSPADM

## 2017-05-23 RX ORDER — PROPOFOL 10 MG/ML
INJECTION, EMULSION INTRAVENOUS AS NEEDED
Status: DISCONTINUED | OUTPATIENT
Start: 2017-05-23 | End: 2017-05-23 | Stop reason: SURG

## 2017-05-23 RX ADMIN — METOPROLOL TARTRATE 2.5 MG: 5 INJECTION INTRAVENOUS at 09:42

## 2017-05-23 RX ADMIN — SODIUM CHLORIDE, SODIUM LACTATE, POTASSIUM CHLORIDE, AND CALCIUM CHLORIDE 100 ML/HR: .6; .31; .03; .02 INJECTION, SOLUTION INTRAVENOUS at 09:06

## 2017-05-23 RX ADMIN — PROPOFOL 30 MG: 10 INJECTION, EMULSION INTRAVENOUS at 10:05

## 2017-05-23 RX ADMIN — METOPROLOL TARTRATE 2.5 MG: 5 INJECTION INTRAVENOUS at 09:47

## 2017-05-23 RX ADMIN — PROPOFOL 20 MG: 10 INJECTION, EMULSION INTRAVENOUS at 09:56

## 2017-05-23 RX ADMIN — LIDOCAINE HYDROCHLORIDE 40 MG: 20 INJECTION, SOLUTION INTRAVENOUS at 09:53

## 2017-05-23 RX ADMIN — METOPROLOL TARTRATE 2.5 MG: 5 INJECTION INTRAVENOUS at 10:12

## 2017-05-23 RX ADMIN — METOPROLOL TARTRATE 2.5 MG: 5 INJECTION INTRAVENOUS at 10:10

## 2017-05-23 RX ADMIN — PROPOFOL 30 MG: 10 INJECTION, EMULSION INTRAVENOUS at 10:12

## 2017-05-23 RX ADMIN — PROPOFOL 30 MG: 10 INJECTION, EMULSION INTRAVENOUS at 09:58

## 2017-05-23 RX ADMIN — PROPOFOL 30 MG: 10 INJECTION, EMULSION INTRAVENOUS at 10:03

## 2017-05-23 RX ADMIN — PROPOFOL 50 MG: 10 INJECTION, EMULSION INTRAVENOUS at 10:08

## 2017-05-23 RX ADMIN — PROPOFOL 30 MG: 10 INJECTION, EMULSION INTRAVENOUS at 10:00

## 2017-05-23 RX ADMIN — PROPOFOL 20 MG: 10 INJECTION, EMULSION INTRAVENOUS at 09:55

## 2017-05-23 RX ADMIN — PROPOFOL 20 MG: 10 INJECTION, EMULSION INTRAVENOUS at 09:54

## 2017-05-23 RX ADMIN — PROPOFOL 50 MG: 10 INJECTION, EMULSION INTRAVENOUS at 09:53

## 2017-05-24 ENCOUNTER — HOSPITAL ENCOUNTER (OUTPATIENT)
Dept: NON INVASIVE DIAGNOSTICS | Facility: HOSPITAL | Age: 57
Discharge: HOME | End: 2017-05-24
Payer: MEDICARE

## 2017-05-24 ENCOUNTER — HOSPITAL ENCOUNTER (OUTPATIENT)
Dept: RADIOLOGY | Facility: HOSPITAL | Age: 57
Discharge: HOME | End: 2017-05-24

## 2017-05-24 VITALS
RESPIRATION RATE: 18 BRPM | DIASTOLIC BLOOD PRESSURE: 59 MMHG | HEART RATE: 82 BPM | TEMPERATURE: 98 F | SYSTOLIC BLOOD PRESSURE: 89 MMHG | OXYGEN SATURATION: 95 %

## 2017-05-24 DIAGNOSIS — C78.7 LIVER METASTASES (HCC): ICD-10-CM

## 2017-05-24 LAB
INR PPP: 1.06 (ref 0.86–1.16)
PROTHROMBIN TIME: 11.2 SECONDS (ref 9.4–11.7)

## 2017-05-24 PROCEDURE — 88341 IMHCHEM/IMCYTCHM EA ADD ANTB: CPT | Performed by: RADIOLOGY

## 2017-05-24 PROCEDURE — 47000 NEEDLE BIOPSY OF LIVER PERQ: CPT

## 2017-05-24 PROCEDURE — 88333 PATH CONSLTJ SURG CYTO XM 1: CPT | Performed by: RADIOLOGY

## 2017-05-24 PROCEDURE — 88342 IMHCHEM/IMCYTCHM 1ST ANTB: CPT | Performed by: RADIOLOGY

## 2017-05-24 PROCEDURE — 88305 TISSUE EXAM BY PATHOLOGIST: CPT | Performed by: RADIOLOGY

## 2017-05-24 PROCEDURE — 76942 ECHO GUIDE FOR BIOPSY: CPT

## 2017-05-24 PROCEDURE — 88313 SPECIAL STAINS GROUP 2: CPT | Performed by: RADIOLOGY

## 2017-05-24 PROCEDURE — 88334 PATH CONSLTJ SURG CYTO XM EA: CPT | Performed by: RADIOLOGY

## 2017-05-24 PROCEDURE — 99152 MOD SED SAME PHYS/QHP 5/>YRS: CPT

## 2017-05-24 PROCEDURE — 85610 PROTHROMBIN TIME: CPT | Performed by: RADIOLOGY

## 2017-05-24 RX ORDER — FENTANYL CITRATE 50 UG/ML
INJECTION, SOLUTION INTRAMUSCULAR; INTRAVENOUS CODE/TRAUMA/SEDATION MEDICATION
Status: COMPLETED | OUTPATIENT
Start: 2017-05-24 | End: 2017-05-24

## 2017-05-24 RX ORDER — MIDAZOLAM HYDROCHLORIDE 1 MG/ML
INJECTION INTRAMUSCULAR; INTRAVENOUS CODE/TRAUMA/SEDATION MEDICATION
Status: COMPLETED | OUTPATIENT
Start: 2017-05-24 | End: 2017-05-24

## 2017-05-24 RX ORDER — LIDOCAINE HYDROCHLORIDE 10 MG/ML
INJECTION, SOLUTION INFILTRATION; PERINEURAL CODE/TRAUMA/SEDATION MEDICATION
Status: COMPLETED | OUTPATIENT
Start: 2017-05-24 | End: 2017-05-24

## 2017-05-24 RX ORDER — SODIUM CHLORIDE 9 MG/ML
50 INJECTION, SOLUTION INTRAVENOUS CONTINUOUS
Status: DISCONTINUED | OUTPATIENT
Start: 2017-05-24 | End: 2017-05-25 | Stop reason: HOSPADM

## 2017-05-24 RX ADMIN — FENTANYL CITRATE 50 MCG: 50 INJECTION, SOLUTION INTRAMUSCULAR; INTRAVENOUS at 12:53

## 2017-05-24 RX ADMIN — LIDOCAINE HYDROCHLORIDE 10 ML: 10 INJECTION, SOLUTION INFILTRATION; PERINEURAL at 12:55

## 2017-05-24 RX ADMIN — SODIUM CHLORIDE 50 ML/HR: 0.9 INJECTION, SOLUTION INTRAVENOUS at 12:21

## 2017-05-24 RX ADMIN — MIDAZOLAM HYDROCHLORIDE 0.5 MG: 1 INJECTION, SOLUTION INTRAMUSCULAR; INTRAVENOUS at 12:52

## 2017-05-30 ENCOUNTER — ALLSCRIPTS OFFICE VISIT (OUTPATIENT)
Dept: OTHER | Facility: OTHER | Age: 57
End: 2017-05-30

## 2017-06-01 ENCOUNTER — GENERIC CONVERSION - ENCOUNTER (OUTPATIENT)
Dept: OTHER | Facility: OTHER | Age: 57
End: 2017-06-01

## 2017-06-02 ENCOUNTER — GENERIC CONVERSION - ENCOUNTER (OUTPATIENT)
Dept: OTHER | Facility: OTHER | Age: 57
End: 2017-06-02

## 2017-06-05 ENCOUNTER — HOSPITAL ENCOUNTER (OUTPATIENT)
Facility: HOSPITAL | Age: 57
Setting detail: OBSERVATION
Discharge: HOME | End: 2017-06-06
Attending: EMERGENCY MEDICINE | Admitting: FAMILY MEDICINE
Payer: MEDICARE

## 2017-06-05 DIAGNOSIS — R07.9 CHEST PAIN: Primary | ICD-10-CM

## 2017-06-05 DIAGNOSIS — R16.0 LIVER MASS: ICD-10-CM

## 2017-06-05 DIAGNOSIS — I10 HTN (HYPERTENSION): ICD-10-CM

## 2017-06-05 DIAGNOSIS — C18.9 COLON CANCER (HCC): ICD-10-CM

## 2017-06-05 DIAGNOSIS — M54.9 EXACERBATION OF CHRONIC BACK PAIN: ICD-10-CM

## 2017-06-05 DIAGNOSIS — G89.29 EXACERBATION OF CHRONIC BACK PAIN: ICD-10-CM

## 2017-06-05 PROCEDURE — 93005 ELECTROCARDIOGRAM TRACING: CPT | Performed by: EMERGENCY MEDICINE

## 2017-06-05 RX ORDER — SODIUM CHLORIDE 9 MG/ML
50 INJECTION, SOLUTION INTRAVENOUS CONTINUOUS
Status: CANCELLED | OUTPATIENT
Start: 2017-06-06

## 2017-06-06 ENCOUNTER — APPOINTMENT (EMERGENCY)
Dept: RADIOLOGY | Facility: HOSPITAL | Age: 57
End: 2017-06-06
Payer: MEDICARE

## 2017-06-06 ENCOUNTER — HOSPITAL ENCOUNTER (OUTPATIENT)
Dept: NON INVASIVE DIAGNOSTICS | Facility: HOSPITAL | Age: 57
Discharge: HOME | End: 2017-06-06
Attending: INTERNAL MEDICINE
Payer: MEDICARE

## 2017-06-06 ENCOUNTER — APPOINTMENT (OUTPATIENT)
Dept: NON INVASIVE DIAGNOSTICS | Facility: HOSPITAL | Age: 57
End: 2017-06-06
Attending: INTERNAL MEDICINE
Payer: MEDICARE

## 2017-06-06 VITALS
WEIGHT: 230.16 LBS | BODY MASS INDEX: 36.99 KG/M2 | TEMPERATURE: 97.6 F | RESPIRATION RATE: 18 BRPM | OXYGEN SATURATION: 95 % | HEIGHT: 66 IN | DIASTOLIC BLOOD PRESSURE: 68 MMHG | SYSTOLIC BLOOD PRESSURE: 168 MMHG | HEART RATE: 76 BPM

## 2017-06-06 PROBLEM — K21.9 GERD (GASTROESOPHAGEAL REFLUX DISEASE): Chronic | Status: ACTIVE | Noted: 2017-05-18

## 2017-06-06 PROBLEM — M48.00 SPINAL STENOSIS: Chronic | Status: ACTIVE | Noted: 2017-05-18

## 2017-06-06 PROBLEM — N40.0 BPH (BENIGN PROSTATIC HYPERPLASIA): Chronic | Status: ACTIVE | Noted: 2017-05-18

## 2017-06-06 PROBLEM — C18.9 COLON CANCER (HCC): Status: ACTIVE | Noted: 2017-06-06

## 2017-06-06 PROBLEM — R07.9 CHEST PAIN: Chronic | Status: ACTIVE | Noted: 2017-05-18

## 2017-06-06 PROBLEM — R07.9 CHEST PAIN: Status: RESOLVED | Noted: 2017-05-18 | Resolved: 2017-06-06

## 2017-06-06 LAB
ANION GAP SERPL CALCULATED.3IONS-SCNC: 10 MMOL/L (ref 4–13)
ANION GAP SERPL CALCULATED.3IONS-SCNC: 12 MMOL/L (ref 4–13)
BACTERIA UR QL AUTO: ABNORMAL /HPF
BASOPHILS # BLD AUTO: 0 THOUSANDS/ΜL (ref 0–0.1)
BASOPHILS NFR BLD AUTO: 1 % (ref 0–1)
BILIRUB UR QL STRIP: NEGATIVE
BUN SERPL-MCNC: 11 MG/DL (ref 5–25)
BUN SERPL-MCNC: 9 MG/DL (ref 5–25)
CALCIUM SERPL-MCNC: 8.7 MG/DL (ref 8.3–10.1)
CALCIUM SERPL-MCNC: 8.8 MG/DL (ref 8.3–10.1)
CHLORIDE SERPL-SCNC: 101 MMOL/L (ref 100–108)
CHLORIDE SERPL-SCNC: 104 MMOL/L (ref 100–108)
CLARITY UR: CLEAR
CO2 SERPL-SCNC: 26 MMOL/L (ref 21–32)
CO2 SERPL-SCNC: 30 MMOL/L (ref 21–32)
COLOR UR: YELLOW
CREAT SERPL-MCNC: 0.77 MG/DL (ref 0.6–1.3)
CREAT SERPL-MCNC: 0.92 MG/DL (ref 0.6–1.3)
EOSINOPHIL # BLD AUTO: 0.3 THOUSAND/ΜL (ref 0–0.61)
EOSINOPHIL NFR BLD AUTO: 4 % (ref 0–6)
ERYTHROCYTE [DISTWIDTH] IN BLOOD BY AUTOMATED COUNT: 13.4 % (ref 11.6–15.1)
ERYTHROCYTE [DISTWIDTH] IN BLOOD BY AUTOMATED COUNT: 13.7 % (ref 11.6–15.1)
GFR SERPL CREATININE-BSD FRML MDRD: >60 ML/MIN/1.73SQ M
GFR SERPL CREATININE-BSD FRML MDRD: >60 ML/MIN/1.73SQ M
GLUCOSE SERPL-MCNC: 102 MG/DL (ref 65–140)
GLUCOSE SERPL-MCNC: 106 MG/DL (ref 65–140)
GLUCOSE SERPL-MCNC: 151 MG/DL (ref 65–140)
GLUCOSE SERPL-MCNC: 280 MG/DL (ref 65–140)
GLUCOSE UR STRIP-MCNC: ABNORMAL MG/DL
HCT VFR BLD AUTO: 36.9 % (ref 42–52)
HCT VFR BLD AUTO: 39.2 % (ref 42–52)
HGB BLD-MCNC: 12.1 G/DL (ref 14–18)
HGB BLD-MCNC: 13.1 G/DL (ref 14–18)
HGB UR QL STRIP.AUTO: NEGATIVE
KETONES UR STRIP-MCNC: NEGATIVE MG/DL
LEUKOCYTE ESTERASE UR QL STRIP: NEGATIVE
LIPASE SERPL-CCNC: 131 U/L (ref 73–393)
LYMPHOCYTES # BLD AUTO: 1.7 THOUSANDS/ΜL (ref 0.6–4.47)
LYMPHOCYTES NFR BLD AUTO: 19 % (ref 14–44)
MAGNESIUM SERPL-MCNC: 1.5 MG/DL (ref 1.6–2.6)
MCH RBC QN AUTO: 28.3 PG (ref 27–31)
MCH RBC QN AUTO: 28.9 PG (ref 27–31)
MCHC RBC AUTO-ENTMCNC: 32.9 G/DL (ref 31.4–37.4)
MCHC RBC AUTO-ENTMCNC: 33.5 G/DL (ref 31.4–37.4)
MCV RBC AUTO: 86 FL (ref 82–98)
MCV RBC AUTO: 87 FL (ref 82–98)
MONOCYTES # BLD AUTO: 0.8 THOUSAND/ΜL (ref 0.17–1.22)
MONOCYTES NFR BLD AUTO: 9 % (ref 4–12)
NEUTROPHILS # BLD AUTO: 5.9 THOUSANDS/ΜL (ref 1.85–7.62)
NEUTS SEG NFR BLD AUTO: 68 % (ref 43–75)
NITRITE UR QL STRIP: NEGATIVE
NON-SQ EPI CELLS URNS QL MICRO: ABNORMAL /HPF
NRBC BLD AUTO-RTO: 0 /100 WBCS
NT-PROBNP SERPL-MCNC: 62 PG/ML
PH UR STRIP.AUTO: 6 [PH] (ref 5–9)
PHOSPHATE SERPL-MCNC: 4.1 MG/DL (ref 2.7–4.5)
PLATELET # BLD AUTO: 298 THOUSANDS/UL (ref 130–400)
PLATELET # BLD AUTO: 319 THOUSANDS/UL (ref 130–400)
PMV BLD AUTO: 7.1 FL (ref 8.9–12.7)
PMV BLD AUTO: 7.4 FL (ref 8.9–12.7)
POTASSIUM SERPL-SCNC: 3.4 MMOL/L (ref 3.5–5.3)
POTASSIUM SERPL-SCNC: 3.5 MMOL/L (ref 3.5–5.3)
PROT UR STRIP-MCNC: ABNORMAL MG/DL
RBC # BLD AUTO: 4.28 MILLION/UL (ref 4.7–6.1)
RBC # BLD AUTO: 4.53 MILLION/UL (ref 4.7–6.1)
RBC #/AREA URNS AUTO: ABNORMAL /HPF
SODIUM SERPL-SCNC: 139 MMOL/L (ref 136–145)
SODIUM SERPL-SCNC: 144 MMOL/L (ref 136–145)
SP GR UR STRIP.AUTO: 1.02 (ref 1–1.03)
TROPONIN I SERPL-MCNC: <0.02 NG/ML
TROPONIN I SERPL-MCNC: <0.02 NG/ML
UROBILINOGEN UR QL STRIP.AUTO: 2 E.U./DL
WBC # BLD AUTO: 8.3 THOUSAND/UL (ref 4.8–10.8)
WBC # BLD AUTO: 8.8 THOUSAND/UL (ref 4.8–10.8)
WBC #/AREA URNS AUTO: ABNORMAL /HPF

## 2017-06-06 PROCEDURE — 99285 EMERGENCY DEPT VISIT HI MDM: CPT

## 2017-06-06 PROCEDURE — 71020 HB CHEST X-RAY 2VW FRONTAL&LATL: CPT

## 2017-06-06 PROCEDURE — 81001 URINALYSIS AUTO W/SCOPE: CPT | Performed by: EMERGENCY MEDICINE

## 2017-06-06 PROCEDURE — 84484 ASSAY OF TROPONIN QUANT: CPT | Performed by: EMERGENCY MEDICINE

## 2017-06-06 PROCEDURE — 99152 MOD SED SAME PHYS/QHP 5/>YRS: CPT

## 2017-06-06 PROCEDURE — 36561 INSERT TUNNELED CV CATH: CPT

## 2017-06-06 PROCEDURE — 87081 CULTURE SCREEN ONLY: CPT | Performed by: EMERGENCY MEDICINE

## 2017-06-06 PROCEDURE — 90670 PCV13 VACCINE IM: CPT | Performed by: FAMILY MEDICINE

## 2017-06-06 PROCEDURE — 77001 FLUOROGUIDE FOR VEIN DEVICE: CPT

## 2017-06-06 PROCEDURE — 82948 REAGENT STRIP/BLOOD GLUCOSE: CPT

## 2017-06-06 PROCEDURE — 84484 ASSAY OF TROPONIN QUANT: CPT | Performed by: FAMILY MEDICINE

## 2017-06-06 PROCEDURE — G0009 ADMIN PNEUMOCOCCAL VACCINE: HCPCS | Performed by: FAMILY MEDICINE

## 2017-06-06 PROCEDURE — 96374 THER/PROPH/DIAG INJ IV PUSH: CPT

## 2017-06-06 PROCEDURE — 80048 BASIC METABOLIC PNL TOTAL CA: CPT | Performed by: FAMILY MEDICINE

## 2017-06-06 PROCEDURE — 80048 BASIC METABOLIC PNL TOTAL CA: CPT | Performed by: EMERGENCY MEDICINE

## 2017-06-06 PROCEDURE — 36415 COLL VENOUS BLD VENIPUNCTURE: CPT | Performed by: EMERGENCY MEDICINE

## 2017-06-06 PROCEDURE — C1788 PORT, INDWELLING, IMP: HCPCS

## 2017-06-06 PROCEDURE — 85027 COMPLETE CBC AUTOMATED: CPT | Performed by: FAMILY MEDICINE

## 2017-06-06 PROCEDURE — 83880 ASSAY OF NATRIURETIC PEPTIDE: CPT | Performed by: EMERGENCY MEDICINE

## 2017-06-06 PROCEDURE — 84100 ASSAY OF PHOSPHORUS: CPT | Performed by: FAMILY MEDICINE

## 2017-06-06 PROCEDURE — 83690 ASSAY OF LIPASE: CPT | Performed by: EMERGENCY MEDICINE

## 2017-06-06 PROCEDURE — 85025 COMPLETE CBC W/AUTO DIFF WBC: CPT | Performed by: EMERGENCY MEDICINE

## 2017-06-06 PROCEDURE — 76937 US GUIDE VASCULAR ACCESS: CPT

## 2017-06-06 PROCEDURE — 93005 ELECTROCARDIOGRAM TRACING: CPT

## 2017-06-06 PROCEDURE — 99153 MOD SED SAME PHYS/QHP EA: CPT

## 2017-06-06 PROCEDURE — 83735 ASSAY OF MAGNESIUM: CPT | Performed by: FAMILY MEDICINE

## 2017-06-06 RX ORDER — SODIUM CHLORIDE 9 MG/ML
50 INJECTION, SOLUTION INTRAVENOUS CONTINUOUS
Status: DISCONTINUED | OUTPATIENT
Start: 2017-06-06 | End: 2017-06-07 | Stop reason: HOSPADM

## 2017-06-06 RX ORDER — LIDOCAINE HYDROCHLORIDE AND EPINEPHRINE 10; 10 MG/ML; UG/ML
INJECTION, SOLUTION INFILTRATION; PERINEURAL CODE/TRAUMA/SEDATION MEDICATION
Status: COMPLETED | OUTPATIENT
Start: 2017-06-06 | End: 2017-06-06

## 2017-06-06 RX ORDER — OXYCODONE HYDROCHLORIDE 5 MG/1
7.5 TABLET ORAL EVERY 6 HOURS PRN
Status: DISCONTINUED | OUTPATIENT
Start: 2017-06-06 | End: 2017-06-07 | Stop reason: HOSPADM

## 2017-06-06 RX ORDER — TAMSULOSIN HYDROCHLORIDE 0.4 MG/1
0.4 CAPSULE ORAL EVERY MORNING
Status: DISCONTINUED | OUTPATIENT
Start: 2017-06-06 | End: 2017-06-07 | Stop reason: HOSPADM

## 2017-06-06 RX ORDER — POTASSIUM CHLORIDE 20 MEQ/1
40 TABLET, EXTENDED RELEASE ORAL ONCE
Status: COMPLETED | OUTPATIENT
Start: 2017-06-06 | End: 2017-06-06

## 2017-06-06 RX ORDER — FENTANYL CITRATE 50 UG/ML
INJECTION, SOLUTION INTRAMUSCULAR; INTRAVENOUS CODE/TRAUMA/SEDATION MEDICATION
Status: COMPLETED | OUTPATIENT
Start: 2017-06-06 | End: 2017-06-06

## 2017-06-06 RX ORDER — PANTOPRAZOLE SODIUM 40 MG/1
40 TABLET, DELAYED RELEASE ORAL
Status: DISCONTINUED | OUTPATIENT
Start: 2017-06-06 | End: 2017-06-07 | Stop reason: HOSPADM

## 2017-06-06 RX ORDER — BACLOFEN 10 MG/1
10 TABLET ORAL 2 TIMES DAILY PRN
Status: DISCONTINUED | OUTPATIENT
Start: 2017-06-06 | End: 2017-06-07 | Stop reason: HOSPADM

## 2017-06-06 RX ORDER — PRAVASTATIN SODIUM 40 MG
40 TABLET ORAL
Status: DISCONTINUED | OUTPATIENT
Start: 2017-06-06 | End: 2017-06-07 | Stop reason: HOSPADM

## 2017-06-06 RX ORDER — MIDAZOLAM HYDROCHLORIDE 1 MG/ML
INJECTION INTRAMUSCULAR; INTRAVENOUS CODE/TRAUMA/SEDATION MEDICATION
Status: COMPLETED | OUTPATIENT
Start: 2017-06-06 | End: 2017-06-06

## 2017-06-06 RX ORDER — ACETAMINOPHEN 325 MG/1
650 TABLET ORAL EVERY 6 HOURS PRN
Status: DISCONTINUED | OUTPATIENT
Start: 2017-06-06 | End: 2017-06-07 | Stop reason: HOSPADM

## 2017-06-06 RX ORDER — LISINOPRIL 5 MG/1
10 TABLET ORAL EVERY MORNING
Status: DISCONTINUED | OUTPATIENT
Start: 2017-06-06 | End: 2017-06-07 | Stop reason: HOSPADM

## 2017-06-06 RX ORDER — ISOSORBIDE MONONITRATE 30 MG/1
30 TABLET, EXTENDED RELEASE ORAL DAILY
Status: DISCONTINUED | OUTPATIENT
Start: 2017-06-06 | End: 2017-06-07 | Stop reason: HOSPADM

## 2017-06-06 RX ORDER — KETOROLAC TROMETHAMINE 30 MG/ML
15 INJECTION, SOLUTION INTRAMUSCULAR; INTRAVENOUS ONCE
Status: COMPLETED | OUTPATIENT
Start: 2017-06-06 | End: 2017-06-06

## 2017-06-06 RX ADMIN — INSULIN LISPRO 2 UNITS: 100 INJECTION, SOLUTION INTRAVENOUS; SUBCUTANEOUS at 17:38

## 2017-06-06 RX ADMIN — OXYCODONE HYDROCHLORIDE 7.5 MG: 5 TABLET ORAL at 19:17

## 2017-06-06 RX ADMIN — SODIUM CHLORIDE 50 ML/HR: 0.9 INJECTION, SOLUTION INTRAVENOUS at 12:49

## 2017-06-06 RX ADMIN — HYDROMORPHONE HYDROCHLORIDE 1 MG: 1 INJECTION, SOLUTION INTRAMUSCULAR; INTRAVENOUS; SUBCUTANEOUS at 00:34

## 2017-06-06 RX ADMIN — ISOSORBIDE MONONITRATE 30 MG: 30 TABLET, EXTENDED RELEASE ORAL at 12:05

## 2017-06-06 RX ADMIN — LISINOPRIL 10 MG: 5 TABLET ORAL at 12:04

## 2017-06-06 RX ADMIN — FENTANYL CITRATE 50 MCG: 50 INJECTION, SOLUTION INTRAMUSCULAR; INTRAVENOUS at 10:44

## 2017-06-06 RX ADMIN — TAMSULOSIN HYDROCHLORIDE 0.4 MG: 0.4 CAPSULE ORAL at 12:06

## 2017-06-06 RX ADMIN — FENTANYL CITRATE 50 MCG: 50 INJECTION, SOLUTION INTRAMUSCULAR; INTRAVENOUS at 10:50

## 2017-06-06 RX ADMIN — INSULIN LISPRO 1 UNITS: 100 INJECTION, SOLUTION INTRAVENOUS; SUBCUTANEOUS at 12:11

## 2017-06-06 RX ADMIN — MIDAZOLAM HYDROCHLORIDE 1 MG: 1 INJECTION, SOLUTION INTRAMUSCULAR; INTRAVENOUS at 10:50

## 2017-06-06 RX ADMIN — LIDOCAINE HYDROCHLORIDE AND EPINEPHRINE 8 ML: 10; 10 INJECTION, SOLUTION INFILTRATION; PERINEURAL at 10:47

## 2017-06-06 RX ADMIN — KETOROLAC TROMETHAMINE 15 MG: 30 INJECTION, SOLUTION INTRAMUSCULAR at 02:16

## 2017-06-06 RX ADMIN — Medication 400 MG: at 13:52

## 2017-06-06 RX ADMIN — MIDAZOLAM HYDROCHLORIDE 1 MG: 1 INJECTION, SOLUTION INTRAMUSCULAR; INTRAVENOUS at 10:44

## 2017-06-06 RX ADMIN — POTASSIUM CHLORIDE 40 MEQ: 1500 TABLET, EXTENDED RELEASE ORAL at 13:52

## 2017-06-06 RX ADMIN — OXYCODONE HYDROCHLORIDE 7.5 MG: 5 TABLET ORAL at 04:28

## 2017-06-06 RX ADMIN — OXYCODONE HYDROCHLORIDE 7.5 MG: 5 TABLET ORAL at 12:48

## 2017-06-06 RX ADMIN — FENTANYL CITRATE 50 MCG: 50 INJECTION, SOLUTION INTRAMUSCULAR; INTRAVENOUS at 10:57

## 2017-06-06 RX ADMIN — PNEUMOCOCCAL 13-VALENT CONJUGATE VACCINE 0.5 ML: 2.2; 2.2; 2.2; 2.2; 2.2; 4.4; 2.2; 2.2; 2.2; 2.2; 2.2; 2.2; 2.2 INJECTION, SUSPENSION INTRAMUSCULAR at 15:37

## 2017-06-06 RX ADMIN — PRAVASTATIN SODIUM 40 MG: 40 TABLET ORAL at 17:38

## 2017-06-06 RX ADMIN — METOPROLOL TARTRATE 25 MG: 25 TABLET ORAL at 12:05

## 2017-06-07 LAB
ATRIAL RATE: 106 BPM
ATRIAL RATE: 76 BPM
ATRIAL RATE: 78 BPM
GLUCOSE SERPL-MCNC: 203 MG/DL (ref 65–140)
MRSA NOSE QL CULT: NORMAL
P AXIS: 18 DEGREES
P AXIS: 49 DEGREES
P AXIS: 50 DEGREES
PR INTERVAL: 122 MS
PR INTERVAL: 124 MS
PR INTERVAL: 136 MS
QRS AXIS: 41 DEGREES
QRS AXIS: 45 DEGREES
QRS AXIS: 46 DEGREES
QRSD INTERVAL: 92 MS
QRSD INTERVAL: 96 MS
QRSD INTERVAL: 96 MS
QT INTERVAL: 342 MS
QT INTERVAL: 370 MS
QT INTERVAL: 390 MS
QTC INTERVAL: 416 MS
QTC INTERVAL: 444 MS
QTC INTERVAL: 454 MS
T WAVE AXIS: 40 DEGREES
T WAVE AXIS: 57 DEGREES
T WAVE AXIS: 57 DEGREES
VENTRICULAR RATE: 106 BPM
VENTRICULAR RATE: 76 BPM
VENTRICULAR RATE: 78 BPM

## 2017-06-08 ENCOUNTER — GENERIC CONVERSION - ENCOUNTER (OUTPATIENT)
Dept: OTHER | Facility: OTHER | Age: 57
End: 2017-06-08

## 2017-06-12 ENCOUNTER — ALLSCRIPTS OFFICE VISIT (OUTPATIENT)
Dept: OTHER | Facility: OTHER | Age: 57
End: 2017-06-12

## 2017-06-16 RX ORDER — FLUOROURACIL 50 MG/ML
645 INJECTION, SOLUTION INTRAVENOUS ONCE
Status: COMPLETED | OUTPATIENT
Start: 2017-06-19 | End: 2017-06-19

## 2017-06-19 ENCOUNTER — HOSPITAL ENCOUNTER (OUTPATIENT)
Dept: INFUSION CENTER | Facility: HOSPITAL | Age: 57
Discharge: HOME | End: 2017-06-19
Payer: MEDICARE

## 2017-06-19 VITALS
SYSTOLIC BLOOD PRESSURE: 136 MMHG | OXYGEN SATURATION: 92 % | HEART RATE: 92 BPM | TEMPERATURE: 97.8 F | RESPIRATION RATE: 20 BRPM | DIASTOLIC BLOOD PRESSURE: 81 MMHG | BODY MASS INDEX: 36.42 KG/M2 | WEIGHT: 226.63 LBS | HEIGHT: 66 IN

## 2017-06-19 LAB
ALBUMIN SERPL BCP-MCNC: 3 G/DL (ref 3.5–5)
ALP SERPL-CCNC: 248 U/L (ref 46–116)
ALT SERPL W P-5'-P-CCNC: 43 U/L (ref 12–78)
ANION GAP SERPL CALCULATED.3IONS-SCNC: 10 MMOL/L (ref 4–13)
AST SERPL W P-5'-P-CCNC: 36 U/L (ref 5–45)
BASOPHILS # BLD AUTO: 0.1 THOUSANDS/ΜL (ref 0–0.1)
BASOPHILS NFR BLD AUTO: 1 % (ref 0–1)
BILIRUB SERPL-MCNC: 0.2 MG/DL (ref 0.2–1)
BUN SERPL-MCNC: 15 MG/DL (ref 5–25)
CALCIUM SERPL-MCNC: 9.2 MG/DL (ref 8.3–10.1)
CHLORIDE SERPL-SCNC: 100 MMOL/L (ref 100–108)
CO2 SERPL-SCNC: 29 MMOL/L (ref 21–32)
CREAT SERPL-MCNC: 0.74 MG/DL (ref 0.6–1.3)
EOSINOPHIL # BLD AUTO: 0.3 THOUSAND/ΜL (ref 0–0.61)
EOSINOPHIL NFR BLD AUTO: 3 % (ref 0–6)
ERYTHROCYTE [DISTWIDTH] IN BLOOD BY AUTOMATED COUNT: 14 % (ref 11.6–15.1)
GFR SERPL CREATININE-BSD FRML MDRD: >60 ML/MIN/1.73SQ M
GLUCOSE SERPL-MCNC: 184 MG/DL (ref 65–140)
HCT VFR BLD AUTO: 39 % (ref 42–52)
HGB BLD-MCNC: 12.8 G/DL (ref 14–18)
LYMPHOCYTES # BLD AUTO: 1.7 THOUSANDS/ΜL (ref 0.6–4.47)
LYMPHOCYTES NFR BLD AUTO: 16 % (ref 14–44)
MCH RBC QN AUTO: 28.5 PG (ref 27–31)
MCHC RBC AUTO-ENTMCNC: 32.8 G/DL (ref 31.4–37.4)
MCV RBC AUTO: 87 FL (ref 82–98)
MONOCYTES # BLD AUTO: 0.8 THOUSAND/ΜL (ref 0.17–1.22)
MONOCYTES NFR BLD AUTO: 7 % (ref 4–12)
NEUTROPHILS # BLD AUTO: 8.2 THOUSANDS/ΜL (ref 1.85–7.62)
NEUTS SEG NFR BLD AUTO: 74 % (ref 43–75)
NRBC BLD AUTO-RTO: 0 /100 WBCS
PLATELET # BLD AUTO: 335 THOUSANDS/UL (ref 130–400)
PMV BLD AUTO: 7.2 FL (ref 8.9–12.7)
POTASSIUM SERPL-SCNC: 3.3 MMOL/L (ref 3.5–5.3)
PROT SERPL-MCNC: 7.4 G/DL (ref 6.4–8.2)
RBC # BLD AUTO: 4.49 MILLION/UL (ref 4.7–6.1)
SODIUM SERPL-SCNC: 139 MMOL/L (ref 136–145)
WBC # BLD AUTO: 11 THOUSAND/UL (ref 4.8–10.8)

## 2017-06-19 PROCEDURE — 80053 COMPREHEN METABOLIC PANEL: CPT | Performed by: INTERNAL MEDICINE

## 2017-06-19 PROCEDURE — 96368 THER/DIAG CONCURRENT INF: CPT

## 2017-06-19 PROCEDURE — G0498 CHEMO EXTEND IV INFUS W/PUMP: HCPCS

## 2017-06-19 PROCEDURE — 96367 TX/PROPH/DG ADDL SEQ IV INF: CPT

## 2017-06-19 PROCEDURE — 96415 CHEMO IV INFUSION ADDL HR: CPT

## 2017-06-19 PROCEDURE — 96411 CHEMO IV PUSH ADDL DRUG: CPT

## 2017-06-19 PROCEDURE — 96413 CHEMO IV INFUSION 1 HR: CPT

## 2017-06-19 PROCEDURE — 85025 COMPLETE CBC W/AUTO DIFF WBC: CPT | Performed by: INTERNAL MEDICINE

## 2017-06-19 RX ORDER — DEXTROSE MONOHYDRATE 50 MG/ML
20 INJECTION, SOLUTION INTRAVENOUS ONCE
Status: COMPLETED | OUTPATIENT
Start: 2017-06-19 | End: 2017-06-19

## 2017-06-19 RX ORDER — POTASSIUM CHLORIDE 20 MEQ/1
40 TABLET, EXTENDED RELEASE ORAL ONCE
Status: COMPLETED | OUTPATIENT
Start: 2017-06-19 | End: 2017-06-19

## 2017-06-19 RX ADMIN — LEUCOVORIN CALCIUM 850 MG: 200 INJECTION, POWDER, LYOPHILIZED, FOR SOLUTION INTRAMUSCULAR; INTRAVENOUS at 11:21

## 2017-06-19 RX ADMIN — FLUOROURACIL 645 MG: 50 INJECTION, SOLUTION INTRAVENOUS at 13:33

## 2017-06-19 RX ADMIN — OXALIPLATIN 137 MG: 5 INJECTION, SOLUTION, CONCENTRATE INTRAVENOUS at 11:21

## 2017-06-19 RX ADMIN — DEXTROSE 20 ML/HR: 50 INJECTION, SOLUTION INTRAVENOUS at 09:30

## 2017-06-19 RX ADMIN — DEXAMETHASONE SODIUM PHOSPHATE: 10 INJECTION, SOLUTION INTRAMUSCULAR; INTRAVENOUS at 10:23

## 2017-06-19 RX ADMIN — POTASSIUM CHLORIDE 40 MEQ: 1500 TABLET, EXTENDED RELEASE ORAL at 12:25

## 2017-06-21 ENCOUNTER — GENERIC CONVERSION - ENCOUNTER (OUTPATIENT)
Dept: OTHER | Facility: OTHER | Age: 57
End: 2017-06-21

## 2017-06-21 ENCOUNTER — HOSPITAL ENCOUNTER (OUTPATIENT)
Dept: INFUSION CENTER | Facility: HOSPITAL | Age: 57
Discharge: HOME | End: 2017-06-21
Payer: MEDICARE

## 2017-06-21 ENCOUNTER — HOSPITAL ENCOUNTER (EMERGENCY)
Facility: HOSPITAL | Age: 57
Discharge: HOME | End: 2017-06-21
Attending: EMERGENCY MEDICINE
Payer: MEDICARE

## 2017-06-21 VITALS
OXYGEN SATURATION: 96 % | TEMPERATURE: 98 F | RESPIRATION RATE: 20 BRPM | HEIGHT: 66 IN | WEIGHT: 226 LBS | DIASTOLIC BLOOD PRESSURE: 87 MMHG | HEART RATE: 99 BPM | SYSTOLIC BLOOD PRESSURE: 143 MMHG | BODY MASS INDEX: 36.32 KG/M2

## 2017-06-21 VITALS
OXYGEN SATURATION: 95 % | DIASTOLIC BLOOD PRESSURE: 81 MMHG | HEART RATE: 105 BPM | SYSTOLIC BLOOD PRESSURE: 134 MMHG | RESPIRATION RATE: 20 BRPM | TEMPERATURE: 99.3 F

## 2017-06-21 DIAGNOSIS — M54.9 BACK PAIN: Primary | ICD-10-CM

## 2017-06-21 PROCEDURE — 96372 THER/PROPH/DIAG INJ SC/IM: CPT

## 2017-06-21 PROCEDURE — 99283 EMERGENCY DEPT VISIT LOW MDM: CPT

## 2017-06-21 RX ORDER — ONDANSETRON 4 MG/1
4 TABLET, FILM COATED ORAL EVERY 8 HOURS PRN
COMMUNITY
End: 2017-10-30

## 2017-06-21 RX ORDER — HYDROMORPHONE HCL 110MG/55ML
2 PATIENT CONTROLLED ANALGESIA SYRINGE INTRAVENOUS ONCE
Status: COMPLETED | OUTPATIENT
Start: 2017-06-21 | End: 2017-06-21

## 2017-06-21 RX ADMIN — Medication 300 UNITS: at 11:30

## 2017-06-21 RX ADMIN — HYDROMORPHONE HYDROCHLORIDE 2 MG: 2 INJECTION, SOLUTION INTRAMUSCULAR; INTRAVENOUS; SUBCUTANEOUS at 02:30

## 2017-06-21 NOTE — PROGRESS NOTES
PT RECEIVED VIA WHEELCHAIR WITH ER TECH  PT WAS SEEN IN ER FOR C/O LEFT LEG PAIN RADIATING TO BACK   STATES HE RAN OUT OF HIS PAIN MED RX AND WAS GIVEN PAIN MED IN ER  PT NOT DUE UNTIL 1130 FOR DISCONNECT FROM CADD PUMP

## 2017-06-26 ENCOUNTER — ALLSCRIPTS OFFICE VISIT (OUTPATIENT)
Dept: OTHER | Facility: OTHER | Age: 57
End: 2017-06-26

## 2017-06-29 ENCOUNTER — TRANSCRIBE ORDERS (OUTPATIENT)
Dept: ADMINISTRATIVE | Facility: HOSPITAL | Age: 57
End: 2017-06-29

## 2017-06-29 ENCOUNTER — APPOINTMENT (OUTPATIENT)
Dept: LAB | Facility: HOSPITAL | Age: 57
End: 2017-06-29
Payer: MEDICARE

## 2017-06-29 DIAGNOSIS — C18.2 MALIGNANT NEOPLASM OF ASCENDING COLON (HCC): Primary | ICD-10-CM

## 2017-06-29 DIAGNOSIS — R16.0 HEPATOMEGALY, NOT ELSEWHERE CLASSIFIED: ICD-10-CM

## 2017-06-29 DIAGNOSIS — R91.8 OTHER NONSPECIFIC ABNORMAL FINDING OF LUNG FIELD: ICD-10-CM

## 2017-06-29 DIAGNOSIS — R16.0 HEPATOMEGALY: ICD-10-CM

## 2017-06-29 DIAGNOSIS — R91.8 LUNG MASS: ICD-10-CM

## 2017-06-29 DIAGNOSIS — C18.9 MALIGNANT NEOPLASM OF COLON, UNSPECIFIED PART OF COLON (HCC): ICD-10-CM

## 2017-06-29 DIAGNOSIS — C18.2 MALIGNANT NEOPLASM OF ASCENDING COLON (HCC): ICD-10-CM

## 2017-06-29 LAB
ALBUMIN SERPL BCP-MCNC: 3.2 G/DL (ref 3.5–5)
ALP SERPL-CCNC: 248 U/L (ref 46–116)
ALT SERPL W P-5'-P-CCNC: 32 U/L (ref 12–78)
ANION GAP SERPL CALCULATED.3IONS-SCNC: 7 MMOL/L (ref 4–13)
AST SERPL W P-5'-P-CCNC: 16 U/L (ref 5–45)
BASOPHILS # BLD AUTO: 0 THOUSANDS/ΜL (ref 0–0.1)
BASOPHILS NFR BLD AUTO: 1 % (ref 0–1)
BILIRUB SERPL-MCNC: 0.2 MG/DL (ref 0.2–1)
BILIRUB UR QL STRIP: NEGATIVE
BUN SERPL-MCNC: 9 MG/DL (ref 5–25)
CALCIUM SERPL-MCNC: 9.1 MG/DL (ref 8.3–10.1)
CHLORIDE SERPL-SCNC: 100 MMOL/L (ref 100–108)
CLARITY UR: CLEAR
CO2 SERPL-SCNC: 33 MMOL/L (ref 21–32)
COLOR UR: YELLOW
CREAT SERPL-MCNC: 0.8 MG/DL (ref 0.6–1.3)
EOSINOPHIL # BLD AUTO: 0.7 THOUSAND/ΜL (ref 0–0.61)
EOSINOPHIL NFR BLD AUTO: 8 % (ref 0–6)
ERYTHROCYTE [DISTWIDTH] IN BLOOD BY AUTOMATED COUNT: 14.3 % (ref 11.6–15.1)
GFR SERPL CREATININE-BSD FRML MDRD: >60 ML/MIN/1.73SQ M
GLUCOSE P FAST SERPL-MCNC: 127 MG/DL (ref 65–99)
GLUCOSE UR STRIP-MCNC: NEGATIVE MG/DL
HCT VFR BLD AUTO: 39 % (ref 42–52)
HGB BLD-MCNC: 12.9 G/DL (ref 14–18)
HGB UR QL STRIP.AUTO: NEGATIVE
KETONES UR STRIP-MCNC: ABNORMAL MG/DL
LEUKOCYTE ESTERASE UR QL STRIP: NEGATIVE
LYMPHOCYTES # BLD AUTO: 2.6 THOUSANDS/ΜL (ref 0.6–4.47)
LYMPHOCYTES NFR BLD AUTO: 33 % (ref 14–44)
MCH RBC QN AUTO: 28.5 PG (ref 27–31)
MCHC RBC AUTO-ENTMCNC: 33 G/DL (ref 31.4–37.4)
MCV RBC AUTO: 86 FL (ref 82–98)
MONOCYTES # BLD AUTO: 0.7 THOUSAND/ΜL (ref 0.17–1.22)
MONOCYTES NFR BLD AUTO: 9 % (ref 4–12)
NEUTROPHILS # BLD AUTO: 3.9 THOUSANDS/ΜL (ref 1.85–7.62)
NEUTS SEG NFR BLD AUTO: 49 % (ref 43–75)
NITRITE UR QL STRIP: NEGATIVE
NRBC BLD AUTO-RTO: 0 /100 WBCS
PH UR STRIP.AUTO: 7 [PH] (ref 5–9)
PLATELET # BLD AUTO: 299 THOUSANDS/UL (ref 130–400)
PMV BLD AUTO: 6.9 FL (ref 8.9–12.7)
POTASSIUM SERPL-SCNC: 3.6 MMOL/L (ref 3.5–5.3)
PROT SERPL-MCNC: 7.3 G/DL (ref 6.4–8.2)
PROT UR STRIP-MCNC: NEGATIVE MG/DL
RBC # BLD AUTO: 4.51 MILLION/UL (ref 4.7–6.1)
SODIUM SERPL-SCNC: 140 MMOL/L (ref 136–145)
SP GR UR STRIP.AUTO: 1.01 (ref 1–1.03)
UROBILINOGEN UR QL STRIP.AUTO: 0.2 E.U./DL
WBC # BLD AUTO: 7.9 THOUSAND/UL (ref 4.8–10.8)

## 2017-06-29 PROCEDURE — 80053 COMPREHEN METABOLIC PANEL: CPT

## 2017-06-29 PROCEDURE — 85025 COMPLETE CBC W/AUTO DIFF WBC: CPT

## 2017-06-29 PROCEDURE — 36415 COLL VENOUS BLD VENIPUNCTURE: CPT | Performed by: PHYSICIAN ASSISTANT

## 2017-06-29 PROCEDURE — 81003 URINALYSIS AUTO W/O SCOPE: CPT

## 2017-06-30 RX ORDER — FLUOROURACIL 50 MG/ML
645 INJECTION, SOLUTION INTRAVENOUS ONCE
Status: COMPLETED | OUTPATIENT
Start: 2017-07-03 | End: 2017-07-03

## 2017-06-30 RX ORDER — DEXTROSE MONOHYDRATE 50 MG/ML
20 INJECTION, SOLUTION INTRAVENOUS ONCE
Status: COMPLETED | OUTPATIENT
Start: 2017-07-03 | End: 2017-07-03

## 2017-06-30 RX ORDER — SODIUM CHLORIDE 9 MG/ML
20 INJECTION, SOLUTION INTRAVENOUS ONCE
Status: COMPLETED | OUTPATIENT
Start: 2017-07-03 | End: 2017-07-03

## 2017-07-03 ENCOUNTER — HOSPITAL ENCOUNTER (OUTPATIENT)
Dept: INFUSION CENTER | Facility: HOSPITAL | Age: 57
Discharge: HOME | End: 2017-07-03
Payer: MEDICARE

## 2017-07-03 VITALS
HEART RATE: 97 BPM | WEIGHT: 226.41 LBS | HEIGHT: 66 IN | RESPIRATION RATE: 20 BRPM | SYSTOLIC BLOOD PRESSURE: 120 MMHG | DIASTOLIC BLOOD PRESSURE: 65 MMHG | OXYGEN SATURATION: 94 % | TEMPERATURE: 98.8 F | BODY MASS INDEX: 36.39 KG/M2

## 2017-07-03 PROCEDURE — 96367 TX/PROPH/DG ADDL SEQ IV INF: CPT

## 2017-07-03 PROCEDURE — 96415 CHEMO IV INFUSION ADDL HR: CPT

## 2017-07-03 PROCEDURE — 96417 CHEMO IV INFUS EACH ADDL SEQ: CPT

## 2017-07-03 PROCEDURE — G0498 CHEMO EXTEND IV INFUS W/PUMP: HCPCS

## 2017-07-03 PROCEDURE — 96368 THER/DIAG CONCURRENT INF: CPT

## 2017-07-03 PROCEDURE — 96413 CHEMO IV INFUSION 1 HR: CPT

## 2017-07-03 PROCEDURE — 96411 CHEMO IV PUSH ADDL DRUG: CPT

## 2017-07-03 RX ADMIN — FLUOROURACIL 645 MG: 50 INJECTION, SOLUTION INTRAVENOUS at 13:10

## 2017-07-03 RX ADMIN — SODIUM CHLORIDE 20 ML/HR: 0.9 INJECTION, SOLUTION INTRAVENOUS at 08:27

## 2017-07-03 RX ADMIN — LEUCOVORIN CALCIUM 850 MG: 200 INJECTION, POWDER, LYOPHILIZED, FOR SOLUTION INTRAMUSCULAR; INTRAVENOUS at 10:48

## 2017-07-03 RX ADMIN — OXALIPLATIN 137 MG: 5 INJECTION, SOLUTION, CONCENTRATE INTRAVENOUS at 10:48

## 2017-07-03 RX ADMIN — DEXAMETHASONE SODIUM PHOSPHATE: 10 INJECTION, SOLUTION INTRAMUSCULAR; INTRAVENOUS at 08:28

## 2017-07-03 RX ADMIN — DEXTROSE 20 ML/HR: 50 INJECTION, SOLUTION INTRAVENOUS at 10:41

## 2017-07-03 RX ADMIN — BEVACIZUMAB 535 MG: 400 INJECTION, SOLUTION INTRAVENOUS at 09:04

## 2017-07-05 ENCOUNTER — HOSPITAL ENCOUNTER (OUTPATIENT)
Dept: INFUSION CENTER | Facility: HOSPITAL | Age: 57
Discharge: HOME | End: 2017-07-05
Payer: MEDICARE

## 2017-07-05 VITALS
TEMPERATURE: 99.1 F | RESPIRATION RATE: 20 BRPM | HEART RATE: 84 BPM | DIASTOLIC BLOOD PRESSURE: 82 MMHG | SYSTOLIC BLOOD PRESSURE: 147 MMHG | OXYGEN SATURATION: 94 %

## 2017-07-05 RX ADMIN — Medication 300 UNITS: at 10:06

## 2017-07-11 ENCOUNTER — ALLSCRIPTS OFFICE VISIT (OUTPATIENT)
Dept: OTHER | Facility: OTHER | Age: 57
End: 2017-07-11

## 2017-07-13 ENCOUNTER — APPOINTMENT (OUTPATIENT)
Dept: LAB | Facility: HOSPITAL | Age: 57
End: 2017-07-13
Payer: MEDICARE

## 2017-07-13 DIAGNOSIS — C18.2 MALIGNANT NEOPLASM OF ASCENDING COLON (HCC): ICD-10-CM

## 2017-07-13 DIAGNOSIS — R91.8 LUNG MASS: ICD-10-CM

## 2017-07-13 DIAGNOSIS — R16.0 HEPATOMEGALY: ICD-10-CM

## 2017-07-13 DIAGNOSIS — R16.0 HEPATOMEGALY, NOT ELSEWHERE CLASSIFIED: ICD-10-CM

## 2017-07-13 DIAGNOSIS — R91.8 OTHER NONSPECIFIC ABNORMAL FINDING OF LUNG FIELD: ICD-10-CM

## 2017-07-13 DIAGNOSIS — C18.9 MALIGNANT NEOPLASM OF COLON, UNSPECIFIED PART OF COLON (HCC): ICD-10-CM

## 2017-07-13 LAB
ALBUMIN SERPL BCP-MCNC: 3.3 G/DL (ref 3.5–5)
ALP SERPL-CCNC: 251 U/L (ref 46–116)
ALT SERPL W P-5'-P-CCNC: 28 U/L (ref 12–78)
ANION GAP SERPL CALCULATED.3IONS-SCNC: 10 MMOL/L (ref 4–13)
AST SERPL W P-5'-P-CCNC: 16 U/L (ref 5–45)
BASOPHILS # BLD AUTO: 0 THOUSANDS/ΜL (ref 0–0.1)
BASOPHILS NFR BLD AUTO: 1 % (ref 0–1)
BILIRUB SERPL-MCNC: 0.1 MG/DL (ref 0.2–1)
BILIRUB UR QL STRIP: NEGATIVE
BUN SERPL-MCNC: 11 MG/DL (ref 5–25)
CALCIUM SERPL-MCNC: 9 MG/DL (ref 8.3–10.1)
CHLORIDE SERPL-SCNC: 99 MMOL/L (ref 100–108)
CLARITY UR: CLEAR
CO2 SERPL-SCNC: 28 MMOL/L (ref 21–32)
COLOR UR: YELLOW
CREAT SERPL-MCNC: 0.95 MG/DL (ref 0.6–1.3)
EOSINOPHIL # BLD AUTO: 0.5 THOUSAND/ΜL (ref 0–0.61)
EOSINOPHIL NFR BLD AUTO: 8 % (ref 0–6)
ERYTHROCYTE [DISTWIDTH] IN BLOOD BY AUTOMATED COUNT: 15.7 % (ref 11.6–15.1)
GFR SERPL CREATININE-BSD FRML MDRD: >60 ML/MIN/1.73SQ M
GLUCOSE P FAST SERPL-MCNC: 223 MG/DL (ref 65–99)
GLUCOSE UR STRIP-MCNC: NEGATIVE MG/DL
HCT VFR BLD AUTO: 39.5 % (ref 42–52)
HGB BLD-MCNC: 13.1 G/DL (ref 14–18)
HGB UR QL STRIP.AUTO: NEGATIVE
KETONES UR STRIP-MCNC: ABNORMAL MG/DL
LEUKOCYTE ESTERASE UR QL STRIP: NEGATIVE
LYMPHOCYTES # BLD AUTO: 2.1 THOUSANDS/ΜL (ref 0.6–4.47)
LYMPHOCYTES NFR BLD AUTO: 34 % (ref 14–44)
MCH RBC QN AUTO: 28.9 PG (ref 27–31)
MCHC RBC AUTO-ENTMCNC: 33.2 G/DL (ref 31.4–37.4)
MCV RBC AUTO: 87 FL (ref 82–98)
MONOCYTES # BLD AUTO: 0.7 THOUSAND/ΜL (ref 0.17–1.22)
MONOCYTES NFR BLD AUTO: 12 % (ref 4–12)
NEUTROPHILS # BLD AUTO: 2.8 THOUSANDS/ΜL (ref 1.85–7.62)
NEUTS SEG NFR BLD AUTO: 46 % (ref 43–75)
NITRITE UR QL STRIP: NEGATIVE
NRBC BLD AUTO-RTO: 0 /100 WBCS
PH UR STRIP.AUTO: 6.5 [PH] (ref 5–9)
PLATELET # BLD AUTO: 253 THOUSANDS/UL (ref 130–400)
PMV BLD AUTO: 6.9 FL (ref 8.9–12.7)
POTASSIUM SERPL-SCNC: 3.9 MMOL/L (ref 3.5–5.3)
PROT SERPL-MCNC: 7 G/DL (ref 6.4–8.2)
PROT UR STRIP-MCNC: NEGATIVE MG/DL
RBC # BLD AUTO: 4.54 MILLION/UL (ref 4.7–6.1)
SODIUM SERPL-SCNC: 137 MMOL/L (ref 136–145)
SP GR UR STRIP.AUTO: 1.01 (ref 1–1.03)
UROBILINOGEN UR QL STRIP.AUTO: 1 E.U./DL
WBC # BLD AUTO: 6.2 THOUSAND/UL (ref 4.8–10.8)

## 2017-07-13 PROCEDURE — 80053 COMPREHEN METABOLIC PANEL: CPT

## 2017-07-13 PROCEDURE — 81003 URINALYSIS AUTO W/O SCOPE: CPT | Performed by: PHYSICIAN ASSISTANT

## 2017-07-13 PROCEDURE — 85025 COMPLETE CBC W/AUTO DIFF WBC: CPT

## 2017-07-13 PROCEDURE — 36415 COLL VENOUS BLD VENIPUNCTURE: CPT

## 2017-07-14 RX ORDER — FLUOROURACIL 50 MG/ML
633 INJECTION, SOLUTION INTRAVENOUS ONCE
Status: COMPLETED | OUTPATIENT
Start: 2017-07-17 | End: 2017-07-17

## 2017-07-14 RX ORDER — SODIUM CHLORIDE 9 MG/ML
20 INJECTION, SOLUTION INTRAVENOUS ONCE
Status: COMPLETED | OUTPATIENT
Start: 2017-07-17 | End: 2017-07-17

## 2017-07-17 ENCOUNTER — GENERIC CONVERSION - ENCOUNTER (OUTPATIENT)
Dept: OTHER | Facility: OTHER | Age: 57
End: 2017-07-17

## 2017-07-17 ENCOUNTER — HOSPITAL ENCOUNTER (OUTPATIENT)
Dept: INFUSION CENTER | Facility: HOSPITAL | Age: 57
Discharge: HOME | End: 2017-07-17
Payer: MEDICARE

## 2017-07-17 VITALS
HEART RATE: 110 BPM | DIASTOLIC BLOOD PRESSURE: 70 MMHG | BODY MASS INDEX: 35.86 KG/M2 | HEIGHT: 66 IN | OXYGEN SATURATION: 91 % | TEMPERATURE: 97.9 F | WEIGHT: 223.11 LBS | RESPIRATION RATE: 24 BRPM | SYSTOLIC BLOOD PRESSURE: 137 MMHG

## 2017-07-17 PROCEDURE — 96413 CHEMO IV INFUSION 1 HR: CPT

## 2017-07-17 PROCEDURE — G0498 CHEMO EXTEND IV INFUS W/PUMP: HCPCS

## 2017-07-17 PROCEDURE — 96415 CHEMO IV INFUSION ADDL HR: CPT

## 2017-07-17 PROCEDURE — 36593 DECLOT VASCULAR DEVICE: CPT

## 2017-07-17 PROCEDURE — 96367 TX/PROPH/DG ADDL SEQ IV INF: CPT

## 2017-07-17 PROCEDURE — 96368 THER/DIAG CONCURRENT INF: CPT

## 2017-07-17 PROCEDURE — 96417 CHEMO IV INFUS EACH ADDL SEQ: CPT

## 2017-07-17 PROCEDURE — 96411 CHEMO IV PUSH ADDL DRUG: CPT

## 2017-07-17 RX ORDER — DEXTROSE MONOHYDRATE 50 MG/ML
20 INJECTION, SOLUTION INTRAVENOUS ONCE
Status: COMPLETED | OUTPATIENT
Start: 2017-07-17 | End: 2017-07-17

## 2017-07-17 RX ADMIN — SODIUM CHLORIDE 20 ML/HR: 0.9 INJECTION, SOLUTION INTRAVENOUS at 09:39

## 2017-07-17 RX ADMIN — ALTEPLASE 2 MG: 2.2 INJECTION, POWDER, LYOPHILIZED, FOR SOLUTION INTRAVENOUS at 09:00

## 2017-07-17 RX ADMIN — FLUOROURACIL 633 MG: 50 INJECTION, SOLUTION INTRAVENOUS at 13:20

## 2017-07-17 RX ADMIN — DEXTROSE 20 ML/HR: 50 INJECTION, SOLUTION INTRAVENOUS at 11:06

## 2017-07-17 RX ADMIN — BEVACIZUMAB 500 MG: 400 INJECTION, SOLUTION INTRAVENOUS at 10:06

## 2017-07-17 RX ADMIN — LEUCOVORIN CALCIUM 850 MG: 200 INJECTION, POWDER, LYOPHILIZED, FOR SOLUTION INTRAMUSCULAR; INTRAVENOUS at 11:10

## 2017-07-17 RX ADMIN — ONDANSETRON: 2 INJECTION INTRAMUSCULAR; INTRAVENOUS at 09:39

## 2017-07-17 RX ADMIN — OXALIPLATIN 135 MG: 5 INJECTION, SOLUTION, CONCENTRATE INTRAVENOUS at 11:11

## 2017-07-19 ENCOUNTER — HOSPITAL ENCOUNTER (OUTPATIENT)
Dept: INFUSION CENTER | Facility: HOSPITAL | Age: 57
Discharge: HOME | End: 2017-07-19
Payer: MEDICARE

## 2017-07-19 VITALS
HEART RATE: 88 BPM | RESPIRATION RATE: 20 BRPM | TEMPERATURE: 99.1 F | OXYGEN SATURATION: 95 % | DIASTOLIC BLOOD PRESSURE: 75 MMHG | SYSTOLIC BLOOD PRESSURE: 139 MMHG

## 2017-07-19 RX ADMIN — Medication 300 UNITS: at 11:33

## 2017-07-19 NOTE — PROGRESS NOTES
RIGHT PORT DE-ACCESSED AS PER PROTOCOL, SITE WITHOUT REDNESS OR SWELLING, POSITIVE BLOOD RETURN, FLUSHED EASILY, HEPLOCKED, DSD WITH BANDAID APPLIED

## 2017-07-24 ENCOUNTER — ALLSCRIPTS OFFICE VISIT (OUTPATIENT)
Dept: OTHER | Facility: OTHER | Age: 57
End: 2017-07-24

## 2017-07-27 ENCOUNTER — TRANSCRIBE ORDERS (OUTPATIENT)
Dept: ADMINISTRATIVE | Facility: HOSPITAL | Age: 57
End: 2017-07-27

## 2017-07-27 ENCOUNTER — APPOINTMENT (OUTPATIENT)
Dept: LAB | Facility: HOSPITAL | Age: 57
End: 2017-07-27
Payer: MEDICARE

## 2017-07-27 DIAGNOSIS — R16.0 HEPATOMEGALY, NOT ELSEWHERE CLASSIFIED: ICD-10-CM

## 2017-07-27 DIAGNOSIS — R91.8 OTHER NONSPECIFIC ABNORMAL FINDING OF LUNG FIELD: ICD-10-CM

## 2017-07-27 DIAGNOSIS — C18.2 MALIGNANT NEOPLASM OF ASCENDING COLON (HCC): ICD-10-CM

## 2017-07-27 LAB
ALBUMIN SERPL BCP-MCNC: 3.5 G/DL (ref 3.5–5)
ALP SERPL-CCNC: 236 U/L (ref 46–116)
ALT SERPL W P-5'-P-CCNC: 27 U/L (ref 12–78)
ANION GAP SERPL CALCULATED.3IONS-SCNC: 12 MMOL/L (ref 4–13)
AST SERPL W P-5'-P-CCNC: 10 U/L (ref 5–45)
BASOPHILS # BLD AUTO: 0 THOUSANDS/ΜL (ref 0–0.1)
BASOPHILS NFR BLD AUTO: 0 % (ref 0–1)
BILIRUB SERPL-MCNC: 0.3 MG/DL (ref 0.2–1)
BILIRUB UR QL STRIP: NEGATIVE
BUN SERPL-MCNC: 12 MG/DL (ref 5–25)
CALCIUM SERPL-MCNC: 8.8 MG/DL (ref 8.3–10.1)
CHLORIDE SERPL-SCNC: 99 MMOL/L (ref 100–108)
CLARITY UR: CLEAR
CO2 SERPL-SCNC: 26 MMOL/L (ref 21–32)
COLOR UR: YELLOW
CREAT SERPL-MCNC: 0.96 MG/DL (ref 0.6–1.3)
EOSINOPHIL # BLD AUTO: 0.2 THOUSAND/ΜL (ref 0–0.61)
EOSINOPHIL NFR BLD AUTO: 2 % (ref 0–6)
ERYTHROCYTE [DISTWIDTH] IN BLOOD BY AUTOMATED COUNT: 17.3 % (ref 11.6–15.1)
GFR SERPL CREATININE-BSD FRML MDRD: 88 ML/MIN/1.73SQ M
GLUCOSE P FAST SERPL-MCNC: 191 MG/DL (ref 65–99)
GLUCOSE UR STRIP-MCNC: NEGATIVE MG/DL
HCT VFR BLD AUTO: 40 % (ref 42–52)
HGB BLD-MCNC: 13.5 G/DL (ref 14–18)
HGB UR QL STRIP.AUTO: NEGATIVE
KETONES UR STRIP-MCNC: NEGATIVE MG/DL
LEUKOCYTE ESTERASE UR QL STRIP: NEGATIVE
LYMPHOCYTES # BLD AUTO: 2.6 THOUSANDS/ΜL (ref 0.6–4.47)
LYMPHOCYTES NFR BLD AUTO: 34 % (ref 14–44)
MCH RBC QN AUTO: 29.3 PG (ref 27–31)
MCHC RBC AUTO-ENTMCNC: 33.7 G/DL (ref 31.4–37.4)
MCV RBC AUTO: 87 FL (ref 82–98)
MONOCYTES # BLD AUTO: 1 THOUSAND/ΜL (ref 0.17–1.22)
MONOCYTES NFR BLD AUTO: 13 % (ref 4–12)
NEUTROPHILS # BLD AUTO: 3.9 THOUSANDS/ΜL (ref 1.85–7.62)
NEUTS SEG NFR BLD AUTO: 50 % (ref 43–75)
NITRITE UR QL STRIP: NEGATIVE
NRBC BLD AUTO-RTO: 0 /100 WBCS
PH UR STRIP.AUTO: 6 [PH] (ref 5–9)
PLATELET # BLD AUTO: 257 THOUSANDS/UL (ref 130–400)
PMV BLD AUTO: 6.5 FL (ref 8.9–12.7)
POTASSIUM SERPL-SCNC: 3.4 MMOL/L (ref 3.5–5.3)
PROT SERPL-MCNC: 7.1 G/DL (ref 6.4–8.2)
PROT UR STRIP-MCNC: NEGATIVE MG/DL
RBC # BLD AUTO: 4.59 MILLION/UL (ref 4.7–6.1)
SODIUM SERPL-SCNC: 137 MMOL/L (ref 136–145)
SP GR UR STRIP.AUTO: <=1.005 (ref 1–1.03)
UROBILINOGEN UR QL STRIP.AUTO: 0.2 E.U./DL
WBC # BLD AUTO: 7.7 THOUSAND/UL (ref 4.8–10.8)

## 2017-07-27 PROCEDURE — 85025 COMPLETE CBC W/AUTO DIFF WBC: CPT

## 2017-07-27 PROCEDURE — 81003 URINALYSIS AUTO W/O SCOPE: CPT

## 2017-07-27 PROCEDURE — 80053 COMPREHEN METABOLIC PANEL: CPT

## 2017-07-27 PROCEDURE — 36415 COLL VENOUS BLD VENIPUNCTURE: CPT

## 2017-07-28 RX ORDER — DEXTROSE MONOHYDRATE 50 MG/ML
20 INJECTION, SOLUTION INTRAVENOUS ONCE
Status: COMPLETED | OUTPATIENT
Start: 2017-07-31 | End: 2017-07-31

## 2017-07-28 RX ORDER — SODIUM CHLORIDE 9 MG/ML
20 INJECTION, SOLUTION INTRAVENOUS ONCE
Status: COMPLETED | OUTPATIENT
Start: 2017-07-31 | End: 2017-07-31

## 2017-07-28 RX ORDER — FLUOROURACIL 50 MG/ML
633 INJECTION, SOLUTION INTRAVENOUS ONCE
Status: COMPLETED | OUTPATIENT
Start: 2017-07-31 | End: 2017-07-31

## 2017-07-31 ENCOUNTER — HOSPITAL ENCOUNTER (OUTPATIENT)
Dept: INFUSION CENTER | Facility: HOSPITAL | Age: 57
Discharge: HOME | End: 2017-07-31
Payer: MEDICARE

## 2017-07-31 VITALS
OXYGEN SATURATION: 96 % | DIASTOLIC BLOOD PRESSURE: 72 MMHG | SYSTOLIC BLOOD PRESSURE: 116 MMHG | HEIGHT: 66 IN | BODY MASS INDEX: 35.29 KG/M2 | HEART RATE: 92 BPM | RESPIRATION RATE: 20 BRPM | WEIGHT: 219.58 LBS | TEMPERATURE: 97.2 F

## 2017-07-31 PROCEDURE — 96368 THER/DIAG CONCURRENT INF: CPT

## 2017-07-31 PROCEDURE — 96413 CHEMO IV INFUSION 1 HR: CPT

## 2017-07-31 PROCEDURE — 96367 TX/PROPH/DG ADDL SEQ IV INF: CPT

## 2017-07-31 PROCEDURE — 96415 CHEMO IV INFUSION ADDL HR: CPT

## 2017-07-31 PROCEDURE — 96411 CHEMO IV PUSH ADDL DRUG: CPT

## 2017-07-31 PROCEDURE — 96417 CHEMO IV INFUS EACH ADDL SEQ: CPT

## 2017-07-31 PROCEDURE — G0498 CHEMO EXTEND IV INFUS W/PUMP: HCPCS

## 2017-07-31 RX ADMIN — OXALIPLATIN 135 MG: 5 INJECTION, SOLUTION, CONCENTRATE INTRAVENOUS at 10:20

## 2017-07-31 RX ADMIN — DEXAMETHASONE SODIUM PHOSPHATE: 10 INJECTION, SOLUTION INTRAMUSCULAR; INTRAVENOUS at 08:30

## 2017-07-31 RX ADMIN — BEVACIZUMAB 500 MG: 400 INJECTION, SOLUTION INTRAVENOUS at 09:29

## 2017-07-31 RX ADMIN — FLUOROURACIL 633 MG: 50 INJECTION, SOLUTION INTRAVENOUS at 12:28

## 2017-07-31 RX ADMIN — DEXTROSE 20 ML/HR: 50 INJECTION, SOLUTION INTRAVENOUS at 10:19

## 2017-07-31 RX ADMIN — LEUCOVORIN CALCIUM 850 MG: 200 INJECTION, POWDER, LYOPHILIZED, FOR SOLUTION INTRAMUSCULAR; INTRAVENOUS at 10:20

## 2017-07-31 RX ADMIN — SODIUM CHLORIDE 20 ML/HR: 0.9 INJECTION, SOLUTION INTRAVENOUS at 08:29

## 2017-08-02 ENCOUNTER — HOSPITAL ENCOUNTER (OUTPATIENT)
Dept: INFUSION CENTER | Facility: HOSPITAL | Age: 57
Discharge: HOME | End: 2017-08-02
Payer: MEDICARE

## 2017-08-02 VITALS
SYSTOLIC BLOOD PRESSURE: 125 MMHG | DIASTOLIC BLOOD PRESSURE: 77 MMHG | OXYGEN SATURATION: 96 % | RESPIRATION RATE: 20 BRPM | HEART RATE: 98 BPM | TEMPERATURE: 98.7 F

## 2017-08-02 RX ADMIN — Medication 300 UNITS: at 10:17

## 2017-08-10 ENCOUNTER — TRANSCRIBE ORDERS (OUTPATIENT)
Dept: ADMINISTRATIVE | Facility: HOSPITAL | Age: 57
End: 2017-08-10

## 2017-08-10 ENCOUNTER — ALLSCRIPTS OFFICE VISIT (OUTPATIENT)
Dept: OTHER | Facility: OTHER | Age: 57
End: 2017-08-10

## 2017-08-10 ENCOUNTER — APPOINTMENT (OUTPATIENT)
Dept: LAB | Facility: HOSPITAL | Age: 57
End: 2017-08-10
Payer: MEDICARE

## 2017-08-10 DIAGNOSIS — R16.0 HEPATOMEGALY: Primary | ICD-10-CM

## 2017-08-10 DIAGNOSIS — C18.2 MALIGNANT NEOPLASM OF ASCENDING COLON (HCC): ICD-10-CM

## 2017-08-10 DIAGNOSIS — R16.0 HEPATOMEGALY: ICD-10-CM

## 2017-08-10 DIAGNOSIS — R91.8 OTHER NONSPECIFIC ABNORMAL FINDING OF LUNG FIELD: ICD-10-CM

## 2017-08-10 DIAGNOSIS — R91.8 LUNG MASS: ICD-10-CM

## 2017-08-10 DIAGNOSIS — R16.0 HEPATOMEGALY, NOT ELSEWHERE CLASSIFIED: ICD-10-CM

## 2017-08-10 LAB
ALBUMIN SERPL BCP-MCNC: 3.5 G/DL (ref 3.5–5)
ALP SERPL-CCNC: 209 U/L (ref 46–116)
ALT SERPL W P-5'-P-CCNC: 27 U/L (ref 12–78)
ANION GAP SERPL CALCULATED.3IONS-SCNC: 9 MMOL/L (ref 4–13)
AST SERPL W P-5'-P-CCNC: 17 U/L (ref 5–45)
BASOPHILS # BLD AUTO: 0 THOUSANDS/ΜL (ref 0–0.1)
BASOPHILS NFR BLD AUTO: 0 % (ref 0–1)
BILIRUB SERPL-MCNC: 0.3 MG/DL (ref 0.2–1)
BILIRUB UR QL STRIP: NEGATIVE
BUN SERPL-MCNC: 9 MG/DL (ref 5–25)
CALCIUM SERPL-MCNC: 8.9 MG/DL (ref 8.3–10.1)
CHLORIDE SERPL-SCNC: 103 MMOL/L (ref 100–108)
CLARITY UR: CLEAR
CO2 SERPL-SCNC: 28 MMOL/L (ref 21–32)
COLOR UR: YELLOW
CREAT SERPL-MCNC: 0.75 MG/DL (ref 0.6–1.3)
EOSINOPHIL # BLD AUTO: 0.2 THOUSAND/ΜL (ref 0–0.61)
EOSINOPHIL NFR BLD AUTO: 2 % (ref 0–6)
ERYTHROCYTE [DISTWIDTH] IN BLOOD BY AUTOMATED COUNT: 17.9 % (ref 11.6–15.1)
GFR SERPL CREATININE-BSD FRML MDRD: 103 ML/MIN/1.73SQ M
GLUCOSE P FAST SERPL-MCNC: 80 MG/DL (ref 65–99)
GLUCOSE UR STRIP-MCNC: NEGATIVE MG/DL
HCT VFR BLD AUTO: 40.3 % (ref 42–52)
HGB BLD-MCNC: 13.6 G/DL (ref 14–18)
HGB UR QL STRIP.AUTO: NEGATIVE
KETONES UR STRIP-MCNC: NEGATIVE MG/DL
LEUKOCYTE ESTERASE UR QL STRIP: NEGATIVE
LYMPHOCYTES # BLD AUTO: 2.3 THOUSANDS/ΜL (ref 0.6–4.47)
LYMPHOCYTES NFR BLD AUTO: 31 % (ref 14–44)
MCH RBC QN AUTO: 29.4 PG (ref 27–31)
MCHC RBC AUTO-ENTMCNC: 33.7 G/DL (ref 31.4–37.4)
MCV RBC AUTO: 87 FL (ref 82–98)
MONOCYTES # BLD AUTO: 0.7 THOUSAND/ΜL (ref 0.17–1.22)
MONOCYTES NFR BLD AUTO: 10 % (ref 4–12)
NEUTROPHILS # BLD AUTO: 4.2 THOUSANDS/ΜL (ref 1.85–7.62)
NEUTS SEG NFR BLD AUTO: 57 % (ref 43–75)
NITRITE UR QL STRIP: NEGATIVE
NRBC BLD AUTO-RTO: 0 /100 WBCS
PH UR STRIP.AUTO: 6 [PH] (ref 5–9)
PLATELET # BLD AUTO: 211 THOUSANDS/UL (ref 130–400)
PMV BLD AUTO: 6.5 FL (ref 8.9–12.7)
POTASSIUM SERPL-SCNC: 3.5 MMOL/L (ref 3.5–5.3)
PROT SERPL-MCNC: 7 G/DL (ref 6.4–8.2)
PROT UR STRIP-MCNC: NEGATIVE MG/DL
RBC # BLD AUTO: 4.61 MILLION/UL (ref 4.7–6.1)
SODIUM SERPL-SCNC: 140 MMOL/L (ref 136–145)
SP GR UR STRIP.AUTO: 1.02 (ref 1–1.03)
UROBILINOGEN UR QL STRIP.AUTO: 1 E.U./DL
WBC # BLD AUTO: 7.4 THOUSAND/UL (ref 4.8–10.8)

## 2017-08-10 PROCEDURE — 85025 COMPLETE CBC W/AUTO DIFF WBC: CPT | Performed by: PHYSICIAN ASSISTANT

## 2017-08-10 PROCEDURE — 81003 URINALYSIS AUTO W/O SCOPE: CPT | Performed by: PHYSICIAN ASSISTANT

## 2017-08-10 PROCEDURE — 80053 COMPREHEN METABOLIC PANEL: CPT

## 2017-08-10 PROCEDURE — 36415 COLL VENOUS BLD VENIPUNCTURE: CPT | Performed by: PHYSICIAN ASSISTANT

## 2017-08-11 ENCOUNTER — ALLSCRIPTS OFFICE VISIT (OUTPATIENT)
Dept: OTHER | Facility: OTHER | Age: 57
End: 2017-08-11

## 2017-08-11 RX ORDER — DEXTROSE MONOHYDRATE 50 MG/ML
20 INJECTION, SOLUTION INTRAVENOUS ONCE
Status: COMPLETED | OUTPATIENT
Start: 2017-08-14 | End: 2017-08-14

## 2017-08-11 RX ORDER — FLUOROURACIL 50 MG/ML
662 INJECTION, SOLUTION INTRAVENOUS ONCE
Status: COMPLETED | OUTPATIENT
Start: 2017-08-14 | End: 2017-08-14

## 2017-08-14 ENCOUNTER — HOSPITAL ENCOUNTER (OUTPATIENT)
Dept: INFUSION CENTER | Facility: HOSPITAL | Age: 57
Discharge: HOME | End: 2017-08-14
Payer: MEDICARE

## 2017-08-14 VITALS
DIASTOLIC BLOOD PRESSURE: 83 MMHG | OXYGEN SATURATION: 94 % | HEIGHT: 66 IN | RESPIRATION RATE: 16 BRPM | BODY MASS INDEX: 34.37 KG/M2 | WEIGHT: 213.85 LBS | HEART RATE: 108 BPM | SYSTOLIC BLOOD PRESSURE: 131 MMHG | TEMPERATURE: 98 F

## 2017-08-14 PROCEDURE — 96417 CHEMO IV INFUS EACH ADDL SEQ: CPT

## 2017-08-14 PROCEDURE — 96411 CHEMO IV PUSH ADDL DRUG: CPT

## 2017-08-14 PROCEDURE — 96368 THER/DIAG CONCURRENT INF: CPT

## 2017-08-14 PROCEDURE — 96413 CHEMO IV INFUSION 1 HR: CPT

## 2017-08-14 PROCEDURE — 96367 TX/PROPH/DG ADDL SEQ IV INF: CPT

## 2017-08-14 PROCEDURE — G0498 CHEMO EXTEND IV INFUS W/PUMP: HCPCS

## 2017-08-14 PROCEDURE — 96415 CHEMO IV INFUSION ADDL HR: CPT

## 2017-08-14 RX ADMIN — BEVACIZUMAB 500 MG: 400 INJECTION, SOLUTION INTRAVENOUS at 08:38

## 2017-08-14 RX ADMIN — OXALIPLATIN 150 MG: 5 INJECTION, SOLUTION, CONCENTRATE INTRAVENOUS at 09:17

## 2017-08-14 RX ADMIN — DEXAMETHASONE SODIUM PHOSPHATE: 10 INJECTION, SOLUTION INTRAMUSCULAR; INTRAVENOUS at 08:14

## 2017-08-14 RX ADMIN — FLUOROURACIL 662 MG: 50 INJECTION, SOLUTION INTRAVENOUS at 11:25

## 2017-08-14 RX ADMIN — DEXTROSE 20 ML/HR: 50 INJECTION, SOLUTION INTRAVENOUS at 09:18

## 2017-08-14 RX ADMIN — LEUCOVORIN CALCIUM 850 MG: 200 INJECTION, POWDER, LYOPHILIZED, FOR SOLUTION INTRAMUSCULAR; INTRAVENOUS at 09:17

## 2017-08-16 ENCOUNTER — HOSPITAL ENCOUNTER (OUTPATIENT)
Dept: INFUSION CENTER | Facility: HOSPITAL | Age: 57
Discharge: HOME | End: 2017-08-16
Payer: MEDICARE

## 2017-08-16 VITALS
HEART RATE: 100 BPM | SYSTOLIC BLOOD PRESSURE: 126 MMHG | TEMPERATURE: 96.6 F | RESPIRATION RATE: 20 BRPM | DIASTOLIC BLOOD PRESSURE: 80 MMHG | OXYGEN SATURATION: 91 %

## 2017-08-16 PROCEDURE — 96523 IRRIG DRUG DELIVERY DEVICE: CPT

## 2017-08-16 RX ADMIN — Medication 300 UNITS: at 09:32

## 2017-08-24 ENCOUNTER — APPOINTMENT (OUTPATIENT)
Dept: LAB | Facility: HOSPITAL | Age: 57
End: 2017-08-24
Payer: MEDICARE

## 2017-08-24 DIAGNOSIS — R91.8 LUNG MASS: ICD-10-CM

## 2017-08-24 DIAGNOSIS — C18.2 MALIGNANT NEOPLASM OF ASCENDING COLON (HCC): ICD-10-CM

## 2017-08-24 DIAGNOSIS — R16.0 HEPATOMEGALY: ICD-10-CM

## 2017-08-24 LAB
ALBUMIN SERPL BCP-MCNC: 3.1 G/DL (ref 3.5–5)
ALP SERPL-CCNC: 180 U/L (ref 46–116)
ALT SERPL W P-5'-P-CCNC: 25 U/L (ref 12–78)
ANION GAP SERPL CALCULATED.3IONS-SCNC: 10 MMOL/L (ref 4–13)
AST SERPL W P-5'-P-CCNC: 9 U/L (ref 5–45)
BASOPHILS # BLD AUTO: 0 THOUSANDS/ΜL (ref 0–0.1)
BASOPHILS NFR BLD AUTO: 0 % (ref 0–1)
BILIRUB SERPL-MCNC: 0.1 MG/DL (ref 0.2–1)
BILIRUB UR QL STRIP: NEGATIVE
BUN SERPL-MCNC: 10 MG/DL (ref 5–25)
CALCIUM SERPL-MCNC: 8.9 MG/DL (ref 8.3–10.1)
CHLORIDE SERPL-SCNC: 102 MMOL/L (ref 100–108)
CLARITY UR: CLEAR
CO2 SERPL-SCNC: 28 MMOL/L (ref 21–32)
COLOR UR: YELLOW
CREAT SERPL-MCNC: 1.16 MG/DL (ref 0.6–1.3)
EOSINOPHIL # BLD AUTO: 0.1 THOUSAND/ΜL (ref 0–0.61)
EOSINOPHIL NFR BLD AUTO: 2 % (ref 0–6)
ERYTHROCYTE [DISTWIDTH] IN BLOOD BY AUTOMATED COUNT: 18.5 % (ref 11.6–15.1)
GFR SERPL CREATININE-BSD FRML MDRD: 70 ML/MIN/1.73SQ M
GLUCOSE P FAST SERPL-MCNC: 213 MG/DL (ref 65–99)
GLUCOSE UR STRIP-MCNC: ABNORMAL MG/DL
HCT VFR BLD AUTO: 37.7 % (ref 42–52)
HGB BLD-MCNC: 12.6 G/DL (ref 14–18)
HGB UR QL STRIP.AUTO: NEGATIVE
KETONES UR STRIP-MCNC: ABNORMAL MG/DL
LEUKOCYTE ESTERASE UR QL STRIP: NEGATIVE
LYMPHOCYTES # BLD AUTO: 2.6 THOUSANDS/ΜL (ref 0.6–4.47)
LYMPHOCYTES NFR BLD AUTO: 49 % (ref 14–44)
MCH RBC QN AUTO: 30.1 PG (ref 27–31)
MCHC RBC AUTO-ENTMCNC: 33.4 G/DL (ref 31.4–37.4)
MCV RBC AUTO: 90 FL (ref 82–98)
MONOCYTES # BLD AUTO: 0.7 THOUSAND/ΜL (ref 0.17–1.22)
MONOCYTES NFR BLD AUTO: 13 % (ref 4–12)
NEUTROPHILS # BLD AUTO: 1.9 THOUSANDS/ΜL (ref 1.85–7.62)
NEUTS SEG NFR BLD AUTO: 36 % (ref 43–75)
NITRITE UR QL STRIP: NEGATIVE
NRBC BLD AUTO-RTO: 0 /100 WBCS
PH UR STRIP.AUTO: 6.5 [PH] (ref 5–9)
PLATELET # BLD AUTO: 180 THOUSANDS/UL (ref 130–400)
PMV BLD AUTO: 6.7 FL (ref 8.9–12.7)
POTASSIUM SERPL-SCNC: 3.6 MMOL/L (ref 3.5–5.3)
PROT SERPL-MCNC: 6.4 G/DL (ref 6.4–8.2)
PROT UR STRIP-MCNC: NEGATIVE MG/DL
RBC # BLD AUTO: 4.19 MILLION/UL (ref 4.7–6.1)
SODIUM SERPL-SCNC: 140 MMOL/L (ref 136–145)
SP GR UR STRIP.AUTO: 1.01 (ref 1–1.03)
UROBILINOGEN UR QL STRIP.AUTO: 1 E.U./DL
WBC # BLD AUTO: 5.3 THOUSAND/UL (ref 4.8–10.8)

## 2017-08-24 PROCEDURE — 80053 COMPREHEN METABOLIC PANEL: CPT | Performed by: PHYSICIAN ASSISTANT

## 2017-08-24 PROCEDURE — 85025 COMPLETE CBC W/AUTO DIFF WBC: CPT | Performed by: PHYSICIAN ASSISTANT

## 2017-08-24 PROCEDURE — 36415 COLL VENOUS BLD VENIPUNCTURE: CPT | Performed by: PHYSICIAN ASSISTANT

## 2017-08-24 PROCEDURE — 81003 URINALYSIS AUTO W/O SCOPE: CPT | Performed by: PHYSICIAN ASSISTANT

## 2017-08-25 RX ORDER — SODIUM CHLORIDE 9 MG/ML
20 INJECTION, SOLUTION INTRAVENOUS ONCE
Status: COMPLETED | OUTPATIENT
Start: 2017-08-28 | End: 2017-08-28

## 2017-08-25 RX ORDER — FLUOROURACIL 50 MG/ML
662 INJECTION, SOLUTION INTRAVENOUS ONCE
Status: COMPLETED | OUTPATIENT
Start: 2017-08-28 | End: 2017-08-28

## 2017-08-28 ENCOUNTER — HOSPITAL ENCOUNTER (OUTPATIENT)
Dept: INFUSION CENTER | Facility: HOSPITAL | Age: 57
Discharge: HOME | End: 2017-08-28
Payer: MEDICARE

## 2017-08-28 VITALS
HEART RATE: 85 BPM | TEMPERATURE: 97.6 F | OXYGEN SATURATION: 92 % | SYSTOLIC BLOOD PRESSURE: 138 MMHG | BODY MASS INDEX: 35.86 KG/M2 | WEIGHT: 223.11 LBS | DIASTOLIC BLOOD PRESSURE: 89 MMHG | RESPIRATION RATE: 20 BRPM | HEIGHT: 66 IN

## 2017-08-28 PROCEDURE — G0498 CHEMO EXTEND IV INFUS W/PUMP: HCPCS

## 2017-08-28 PROCEDURE — 96411 CHEMO IV PUSH ADDL DRUG: CPT

## 2017-08-28 PROCEDURE — 96415 CHEMO IV INFUSION ADDL HR: CPT

## 2017-08-28 PROCEDURE — 96368 THER/DIAG CONCURRENT INF: CPT

## 2017-08-28 PROCEDURE — 96367 TX/PROPH/DG ADDL SEQ IV INF: CPT

## 2017-08-28 PROCEDURE — 96417 CHEMO IV INFUS EACH ADDL SEQ: CPT

## 2017-08-28 PROCEDURE — 96413 CHEMO IV INFUSION 1 HR: CPT

## 2017-08-28 RX ADMIN — OXALIPLATIN 150 MG: 5 INJECTION, SOLUTION, CONCENTRATE INTRAVENOUS at 09:38

## 2017-08-28 RX ADMIN — DEXAMETHASONE SODIUM PHOSPHATE: 10 INJECTION, SOLUTION INTRAMUSCULAR; INTRAVENOUS at 08:19

## 2017-08-28 RX ADMIN — BEVACIZUMAB 500 MG: 400 INJECTION, SOLUTION INTRAVENOUS at 08:59

## 2017-08-28 RX ADMIN — LEUCOVORIN CALCIUM 850 MG: 200 INJECTION, POWDER, LYOPHILIZED, FOR SOLUTION INTRAMUSCULAR; INTRAVENOUS at 09:37

## 2017-08-28 RX ADMIN — FLUOROURACIL 662 MG: 50 INJECTION, SOLUTION INTRAVENOUS at 11:57

## 2017-08-28 RX ADMIN — SODIUM CHLORIDE 20 ML/HR: 0.9 INJECTION, SOLUTION INTRAVENOUS at 08:19

## 2017-08-30 ENCOUNTER — HOSPITAL ENCOUNTER (OUTPATIENT)
Dept: INFUSION CENTER | Facility: HOSPITAL | Age: 57
Discharge: HOME | End: 2017-08-30
Payer: MEDICARE

## 2017-08-30 VITALS
TEMPERATURE: 97.3 F | DIASTOLIC BLOOD PRESSURE: 83 MMHG | RESPIRATION RATE: 20 BRPM | SYSTOLIC BLOOD PRESSURE: 144 MMHG | OXYGEN SATURATION: 91 % | HEART RATE: 92 BPM

## 2017-08-30 RX ADMIN — Medication 300 UNITS: at 10:07

## 2017-08-31 ENCOUNTER — ALLSCRIPTS OFFICE VISIT (OUTPATIENT)
Dept: OTHER | Facility: OTHER | Age: 57
End: 2017-08-31

## 2017-09-07 ENCOUNTER — APPOINTMENT (OUTPATIENT)
Dept: LAB | Facility: HOSPITAL | Age: 57
End: 2017-09-07
Payer: MEDICARE

## 2017-09-07 ENCOUNTER — TRANSCRIBE ORDERS (OUTPATIENT)
Dept: ADMINISTRATIVE | Facility: HOSPITAL | Age: 57
End: 2017-09-07

## 2017-09-07 DIAGNOSIS — R91.8 LUNG MASS: ICD-10-CM

## 2017-09-07 DIAGNOSIS — C18.2 MALIGNANT NEOPLASM OF ASCENDING COLON (HCC): ICD-10-CM

## 2017-09-07 DIAGNOSIS — R16.0 HEPATOMEGALY: Primary | ICD-10-CM

## 2017-09-07 DIAGNOSIS — R16.0 HEPATOMEGALY: ICD-10-CM

## 2017-09-07 DIAGNOSIS — R16.0 HEPATOMEGALY, NOT ELSEWHERE CLASSIFIED: ICD-10-CM

## 2017-09-07 DIAGNOSIS — R91.8 OTHER NONSPECIFIC ABNORMAL FINDING OF LUNG FIELD: ICD-10-CM

## 2017-09-07 LAB
ALBUMIN SERPL BCP-MCNC: 3.4 G/DL (ref 3.5–5)
ALP SERPL-CCNC: 176 U/L (ref 46–116)
ALT SERPL W P-5'-P-CCNC: 28 U/L (ref 12–78)
ANION GAP SERPL CALCULATED.3IONS-SCNC: 12 MMOL/L (ref 4–13)
AST SERPL W P-5'-P-CCNC: 10 U/L (ref 5–45)
BASOPHILS # BLD AUTO: 0 THOUSANDS/ΜL (ref 0–0.1)
BASOPHILS NFR BLD AUTO: 0 % (ref 0–1)
BILIRUB SERPL-MCNC: 0.2 MG/DL (ref 0.2–1)
BILIRUB UR QL STRIP: NEGATIVE
BUN SERPL-MCNC: 15 MG/DL (ref 5–25)
CALCIUM SERPL-MCNC: 8.9 MG/DL (ref 8.3–10.1)
CHLORIDE SERPL-SCNC: 101 MMOL/L (ref 100–108)
CLARITY UR: CLEAR
CO2 SERPL-SCNC: 25 MMOL/L (ref 21–32)
COLOR UR: YELLOW
CREAT SERPL-MCNC: 0.79 MG/DL (ref 0.6–1.3)
EOSINOPHIL # BLD AUTO: 0.1 THOUSAND/ΜL (ref 0–0.61)
EOSINOPHIL NFR BLD AUTO: 2 % (ref 0–6)
ERYTHROCYTE [DISTWIDTH] IN BLOOD BY AUTOMATED COUNT: 19 % (ref 11.6–15.1)
GFR SERPL CREATININE-BSD FRML MDRD: 100 ML/MIN/1.73SQ M
GLUCOSE P FAST SERPL-MCNC: 251 MG/DL (ref 65–99)
GLUCOSE UR STRIP-MCNC: ABNORMAL MG/DL
HCT VFR BLD AUTO: 40 % (ref 42–52)
HGB BLD-MCNC: 13.6 G/DL (ref 14–18)
HGB UR QL STRIP.AUTO: NEGATIVE
KETONES UR STRIP-MCNC: ABNORMAL MG/DL
LEUKOCYTE ESTERASE UR QL STRIP: NEGATIVE
LYMPHOCYTES # BLD AUTO: 2.3 THOUSANDS/ΜL (ref 0.6–4.47)
LYMPHOCYTES NFR BLD AUTO: 33 % (ref 14–44)
MCH RBC QN AUTO: 30.7 PG (ref 27–31)
MCHC RBC AUTO-ENTMCNC: 33.9 G/DL (ref 31.4–37.4)
MCV RBC AUTO: 91 FL (ref 82–98)
MONOCYTES # BLD AUTO: 0.9 THOUSAND/ΜL (ref 0.17–1.22)
MONOCYTES NFR BLD AUTO: 13 % (ref 4–12)
NEUTROPHILS # BLD AUTO: 3.6 THOUSANDS/ΜL (ref 1.85–7.62)
NEUTS SEG NFR BLD AUTO: 52 % (ref 43–75)
NITRITE UR QL STRIP: NEGATIVE
NRBC BLD AUTO-RTO: 0 /100 WBCS
PH UR STRIP.AUTO: 6 [PH] (ref 5–9)
PLATELET # BLD AUTO: 191 THOUSANDS/UL (ref 130–400)
PMV BLD AUTO: 6.7 FL (ref 8.9–12.7)
POTASSIUM SERPL-SCNC: 3.4 MMOL/L (ref 3.5–5.3)
PROT SERPL-MCNC: 6.8 G/DL (ref 6.4–8.2)
PROT UR STRIP-MCNC: NEGATIVE MG/DL
RBC # BLD AUTO: 4.42 MILLION/UL (ref 4.7–6.1)
SODIUM SERPL-SCNC: 138 MMOL/L (ref 136–145)
SP GR UR STRIP.AUTO: 1.02 (ref 1–1.03)
UROBILINOGEN UR QL STRIP.AUTO: 0.2 E.U./DL
WBC # BLD AUTO: 7 THOUSAND/UL (ref 4.8–10.8)

## 2017-09-07 PROCEDURE — 80053 COMPREHEN METABOLIC PANEL: CPT

## 2017-09-07 PROCEDURE — 81003 URINALYSIS AUTO W/O SCOPE: CPT

## 2017-09-07 PROCEDURE — 85025 COMPLETE CBC W/AUTO DIFF WBC: CPT

## 2017-09-07 PROCEDURE — 36415 COLL VENOUS BLD VENIPUNCTURE: CPT

## 2017-09-08 RX ORDER — FLUOROURACIL 50 MG/ML
662 INJECTION, SOLUTION INTRAVENOUS ONCE
Status: COMPLETED | OUTPATIENT
Start: 2017-09-11 | End: 2017-09-11

## 2017-09-08 RX ORDER — SODIUM CHLORIDE 9 MG/ML
20 INJECTION, SOLUTION INTRAVENOUS ONCE
Status: COMPLETED | OUTPATIENT
Start: 2017-09-11 | End: 2017-09-11

## 2017-09-11 ENCOUNTER — HOSPITAL ENCOUNTER (OUTPATIENT)
Dept: INFUSION CENTER | Facility: HOSPITAL | Age: 57
Discharge: HOME | End: 2017-09-11
Payer: MEDICARE

## 2017-09-11 VITALS
BODY MASS INDEX: 35.75 KG/M2 | WEIGHT: 222.44 LBS | DIASTOLIC BLOOD PRESSURE: 81 MMHG | RESPIRATION RATE: 20 BRPM | TEMPERATURE: 97.5 F | HEART RATE: 89 BPM | HEIGHT: 66 IN | SYSTOLIC BLOOD PRESSURE: 139 MMHG

## 2017-09-11 PROCEDURE — 96411 CHEMO IV PUSH ADDL DRUG: CPT

## 2017-09-11 PROCEDURE — 96368 THER/DIAG CONCURRENT INF: CPT

## 2017-09-11 PROCEDURE — 96417 CHEMO IV INFUS EACH ADDL SEQ: CPT

## 2017-09-11 PROCEDURE — 96367 TX/PROPH/DG ADDL SEQ IV INF: CPT

## 2017-09-11 PROCEDURE — 96413 CHEMO IV INFUSION 1 HR: CPT

## 2017-09-11 PROCEDURE — G0498 CHEMO EXTEND IV INFUS W/PUMP: HCPCS

## 2017-09-11 PROCEDURE — 96415 CHEMO IV INFUSION ADDL HR: CPT

## 2017-09-11 RX ADMIN — SODIUM CHLORIDE 20 ML/HR: 0.9 INJECTION, SOLUTION INTRAVENOUS at 08:20

## 2017-09-11 RX ADMIN — LEUCOVORIN CALCIUM 850 MG: 200 INJECTION, POWDER, LYOPHILIZED, FOR SOLUTION INTRAMUSCULAR; INTRAVENOUS at 09:34

## 2017-09-11 RX ADMIN — BEVACIZUMAB 500 MG: 400 INJECTION, SOLUTION INTRAVENOUS at 08:50

## 2017-09-11 RX ADMIN — FLUOROURACIL 662 MG: 50 INJECTION, SOLUTION INTRAVENOUS at 11:46

## 2017-09-11 RX ADMIN — OXALIPLATIN 150 MG: 5 INJECTION, SOLUTION, CONCENTRATE INTRAVENOUS at 09:34

## 2017-09-11 RX ADMIN — DEXAMETHASONE SODIUM PHOSPHATE: 10 INJECTION, SOLUTION INTRAMUSCULAR; INTRAVENOUS at 08:20

## 2017-09-11 NOTE — PLAN OF CARE
Problem: GASTROINTESTINAL - ADULT  Goal: Minimal or absence of nausea and/or vomiting  INTERVENTIONS:  - Administer IV fluids as ordered to ensure adequate hydration  - Maintain NPO status until nausea and vomiting are resolved  - Nasogastric tube as ordered  - Administer ordered antiemetic medications as needed  - Provide nonpharmacologic comfort measures as appropriate  - Advance diet as tolerated, if ordered  - Nutrition services referral to assist patient with adequate nutrition and appropriate food choices  Outcome: Progressing    Goal: Maintains or returns to baseline bowel function  INTERVENTIONS:  - Assess bowel function  - Encourage oral fluids to ensure adequate hydration  - Administer IV fluids as ordered to ensure adequate hydration  - Administer ordered medications as needed  - Encourage mobilization and activity  - Nutrition services referral to assist patient with appropriate food choices  Outcome: Progressing    Goal: Maintains adequate nutritional intake  INTERVENTIONS:  - Monitor percentage of each meal consumed  - Identify factors contributing to decreased intake, treat as appropriate  - Assist with meals as needed  - Monitor I&O, WT and lab values  - Obtain nutrition services referral as needed  Outcome: Progressing

## 2017-09-11 NOTE — PLAN OF CARE
Problem: PAIN - ADULT  Goal: Verbalizes/displays adequate comfort level or baseline comfort level  Interventions:  - Encourage patient to monitor pain and request assistance  - Assess pain using appropriate pain scale  - Administer analgesics based on type and severity of pain and evaluate response  - Implement non-pharmacological measures as appropriate and evaluate response  - Consider cultural and social influences on pain and pain management  - Notify physician/advanced practitioner if interventions unsuccessful or patient reports new pain   Outcome: Progressing      Problem: GASTROINTESTINAL - ADULT  Goal: Minimal or absence of nausea and/or vomiting  INTERVENTIONS:  - Administer IV fluids as ordered to ensure adequate hydration  - Maintain NPO status until nausea and vomiting are resolved  - Nasogastric tube as ordered  - Administer ordered antiemetic medications as needed  - Provide nonpharmacologic comfort measures as appropriate  - Advance diet as tolerated, if ordered  - Nutrition services referral to assist patient with adequate nutrition and appropriate food choices  Outcome: Progressing      Problem: INFECTION - ADULT  Goal: Absence or prevention of progression during hospitalization  INTERVENTIONS:  - Assess and monitor for signs and symptoms of infection  - Monitor lab/diagnostic results  - Monitor all insertion sites, i e  indwelling lines, tubes, and drains  - Monitor endotracheal (as able) and nasal secretions for changes in amount and color  - [place] appropriate cooling/warming therapies per order  - Administer medications as ordered  - Instruct and encourage patient and family to use good hand hygiene technique  - Identify and instruct in appropriate isolation precautions for identified infection/condition  Outcome: Progressing    Goal: Absence of fever/infection during neutropenic period  INTERVENTIONS:  - Monitor WBC  - Implement neutropenic guidelines  Outcome: Progressing

## 2017-09-13 ENCOUNTER — HOSPITAL ENCOUNTER (OUTPATIENT)
Dept: INFUSION CENTER | Facility: HOSPITAL | Age: 57
Discharge: HOME | End: 2017-09-13
Payer: MEDICARE

## 2017-09-13 VITALS
TEMPERATURE: 96.7 F | DIASTOLIC BLOOD PRESSURE: 84 MMHG | OXYGEN SATURATION: 94 % | SYSTOLIC BLOOD PRESSURE: 151 MMHG | HEART RATE: 84 BPM | RESPIRATION RATE: 20 BRPM

## 2017-09-13 RX ADMIN — Medication 300 UNITS: at 10:02

## 2017-09-21 ENCOUNTER — APPOINTMENT (OUTPATIENT)
Dept: LAB | Facility: HOSPITAL | Age: 57
End: 2017-09-21
Payer: MEDICARE

## 2017-09-21 DIAGNOSIS — C18.2 MALIGNANT NEOPLASM OF ASCENDING COLON (HCC): ICD-10-CM

## 2017-09-21 DIAGNOSIS — R16.0 HEPATOMEGALY, NOT ELSEWHERE CLASSIFIED: ICD-10-CM

## 2017-09-21 DIAGNOSIS — R91.8 OTHER NONSPECIFIC ABNORMAL FINDING OF LUNG FIELD: ICD-10-CM

## 2017-09-21 DIAGNOSIS — R91.8 LUNG MASS: ICD-10-CM

## 2017-09-21 DIAGNOSIS — R16.0 HEPATOMEGALY: ICD-10-CM

## 2017-09-21 DIAGNOSIS — R91.8 OTHER NONSPECIFIC ABNORMAL FINDING OF LUNG FIELD (CODE): ICD-10-CM

## 2017-09-21 DIAGNOSIS — E11.65 TYPE 2 DIABETES MELLITUS WITH HYPERGLYCEMIA (HCC): ICD-10-CM

## 2017-09-21 LAB
ALBUMIN SERPL BCP-MCNC: 3.5 G/DL (ref 3.5–5)
ALP SERPL-CCNC: 154 U/L (ref 46–116)
ALT SERPL W P-5'-P-CCNC: 18 U/L (ref 12–78)
ANION GAP SERPL CALCULATED.3IONS-SCNC: 8 MMOL/L (ref 4–13)
AST SERPL W P-5'-P-CCNC: 11 U/L (ref 5–45)
BASOPHILS # BLD AUTO: 0 THOUSANDS/ΜL (ref 0–0.1)
BASOPHILS NFR BLD AUTO: 0 % (ref 0–1)
BILIRUB SERPL-MCNC: 0.3 MG/DL (ref 0.2–1)
BILIRUB UR QL STRIP: NEGATIVE
BUN SERPL-MCNC: 9 MG/DL (ref 5–25)
CALCIUM SERPL-MCNC: 9.1 MG/DL (ref 8.3–10.1)
CHLORIDE SERPL-SCNC: 102 MMOL/L (ref 100–108)
CLARITY UR: CLEAR
CO2 SERPL-SCNC: 28 MMOL/L (ref 21–32)
COLOR UR: YELLOW
CREAT SERPL-MCNC: 0.83 MG/DL (ref 0.6–1.3)
EOSINOPHIL # BLD AUTO: 0.1 THOUSAND/ΜL (ref 0–0.61)
EOSINOPHIL NFR BLD AUTO: 2 % (ref 0–6)
ERYTHROCYTE [DISTWIDTH] IN BLOOD BY AUTOMATED COUNT: 18.8 % (ref 11.6–15.1)
GFR SERPL CREATININE-BSD FRML MDRD: 98 ML/MIN/1.73SQ M
GLUCOSE P FAST SERPL-MCNC: 88 MG/DL (ref 65–99)
GLUCOSE UR STRIP-MCNC: NEGATIVE MG/DL
HCT VFR BLD AUTO: 40.9 % (ref 42–52)
HGB BLD-MCNC: 13.6 G/DL (ref 14–18)
HGB UR QL STRIP.AUTO: NEGATIVE
KETONES UR STRIP-MCNC: NEGATIVE MG/DL
LEUKOCYTE ESTERASE UR QL STRIP: NEGATIVE
LYMPHOCYTES # BLD AUTO: 3 THOUSANDS/ΜL (ref 0.6–4.47)
LYMPHOCYTES NFR BLD AUTO: 39 % (ref 14–44)
MCH RBC QN AUTO: 30.6 PG (ref 27–31)
MCHC RBC AUTO-ENTMCNC: 33.1 G/DL (ref 31.4–37.4)
MCV RBC AUTO: 92 FL (ref 82–98)
MONOCYTES # BLD AUTO: 1 THOUSAND/ΜL (ref 0.17–1.22)
MONOCYTES NFR BLD AUTO: 13 % (ref 4–12)
NEUTROPHILS # BLD AUTO: 3.5 THOUSANDS/ΜL (ref 1.85–7.62)
NEUTS SEG NFR BLD AUTO: 46 % (ref 43–75)
NITRITE UR QL STRIP: NEGATIVE
NRBC BLD AUTO-RTO: 0 /100 WBCS
PH UR STRIP.AUTO: 6.5 [PH] (ref 5–9)
PLATELET # BLD AUTO: 199 THOUSANDS/UL (ref 130–400)
PMV BLD AUTO: 6.4 FL (ref 8.9–12.7)
POTASSIUM SERPL-SCNC: 3.4 MMOL/L (ref 3.5–5.3)
PROT SERPL-MCNC: 7.2 G/DL (ref 6.4–8.2)
PROT UR STRIP-MCNC: NEGATIVE MG/DL
RBC # BLD AUTO: 4.43 MILLION/UL (ref 4.7–6.1)
SODIUM SERPL-SCNC: 138 MMOL/L (ref 136–145)
SP GR UR STRIP.AUTO: <=1.005 (ref 1–1.03)
UROBILINOGEN UR QL STRIP.AUTO: 0.2 E.U./DL
WBC # BLD AUTO: 7.6 THOUSAND/UL (ref 4.8–10.8)

## 2017-09-21 PROCEDURE — 81003 URINALYSIS AUTO W/O SCOPE: CPT | Performed by: PHYSICIAN ASSISTANT

## 2017-09-21 PROCEDURE — 36415 COLL VENOUS BLD VENIPUNCTURE: CPT | Performed by: PHYSICIAN ASSISTANT

## 2017-09-21 PROCEDURE — 80053 COMPREHEN METABOLIC PANEL: CPT

## 2017-09-21 PROCEDURE — 85025 COMPLETE CBC W/AUTO DIFF WBC: CPT | Performed by: PHYSICIAN ASSISTANT

## 2017-09-22 RX ORDER — FLUOROURACIL 50 MG/ML
662 INJECTION, SOLUTION INTRAVENOUS ONCE
Status: COMPLETED | OUTPATIENT
Start: 2017-09-25 | End: 2017-09-25

## 2017-09-22 RX ORDER — DEXTROSE MONOHYDRATE 50 MG/ML
20 INJECTION, SOLUTION INTRAVENOUS ONCE
Status: COMPLETED | OUTPATIENT
Start: 2017-09-25 | End: 2017-09-25

## 2017-09-22 RX ORDER — SODIUM CHLORIDE 9 MG/ML
20 INJECTION, SOLUTION INTRAVENOUS ONCE
Status: COMPLETED | OUTPATIENT
Start: 2017-09-25 | End: 2017-09-25

## 2017-09-25 ENCOUNTER — HOSPITAL ENCOUNTER (OUTPATIENT)
Dept: INFUSION CENTER | Facility: HOSPITAL | Age: 57
Discharge: HOME | End: 2017-09-25
Payer: MEDICARE

## 2017-09-25 VITALS
DIASTOLIC BLOOD PRESSURE: 79 MMHG | SYSTOLIC BLOOD PRESSURE: 127 MMHG | HEART RATE: 89 BPM | RESPIRATION RATE: 20 BRPM | HEIGHT: 66 IN | OXYGEN SATURATION: 97 % | WEIGHT: 220.46 LBS | BODY MASS INDEX: 35.43 KG/M2 | TEMPERATURE: 98.5 F

## 2017-09-25 PROCEDURE — 96413 CHEMO IV INFUSION 1 HR: CPT

## 2017-09-25 PROCEDURE — 96368 THER/DIAG CONCURRENT INF: CPT

## 2017-09-25 PROCEDURE — 96411 CHEMO IV PUSH ADDL DRUG: CPT

## 2017-09-25 PROCEDURE — 96415 CHEMO IV INFUSION ADDL HR: CPT

## 2017-09-25 PROCEDURE — 96417 CHEMO IV INFUS EACH ADDL SEQ: CPT

## 2017-09-25 PROCEDURE — 96367 TX/PROPH/DG ADDL SEQ IV INF: CPT

## 2017-09-25 PROCEDURE — G0498 CHEMO EXTEND IV INFUS W/PUMP: HCPCS

## 2017-09-25 RX ADMIN — BEVACIZUMAB 500 MG: 400 INJECTION, SOLUTION INTRAVENOUS at 08:49

## 2017-09-25 RX ADMIN — DEXTROSE 20 ML/HR: 50 INJECTION, SOLUTION INTRAVENOUS at 09:32

## 2017-09-25 RX ADMIN — SODIUM CHLORIDE 20 ML/HR: 0.9 INJECTION, SOLUTION INTRAVENOUS at 08:12

## 2017-09-25 RX ADMIN — FLUOROURACIL 662 MG: 50 INJECTION, SOLUTION INTRAVENOUS at 11:51

## 2017-09-25 RX ADMIN — DEXAMETHASONE SODIUM PHOSPHATE: 10 INJECTION, SOLUTION INTRAMUSCULAR; INTRAVENOUS at 08:12

## 2017-09-25 RX ADMIN — LEUCOVORIN CALCIUM 850 MG: 200 INJECTION, POWDER, LYOPHILIZED, FOR SOLUTION INTRAMUSCULAR; INTRAVENOUS at 09:32

## 2017-09-25 RX ADMIN — OXALIPLATIN 150 MG: 5 INJECTION, SOLUTION, CONCENTRATE INTRAVENOUS at 09:33

## 2017-09-27 ENCOUNTER — HOSPITAL ENCOUNTER (OUTPATIENT)
Dept: INFUSION CENTER | Facility: HOSPITAL | Age: 57
Discharge: HOME | End: 2017-09-27
Payer: MEDICARE

## 2017-09-27 VITALS
RESPIRATION RATE: 20 BRPM | HEART RATE: 97 BPM | DIASTOLIC BLOOD PRESSURE: 83 MMHG | SYSTOLIC BLOOD PRESSURE: 134 MMHG | TEMPERATURE: 97.8 F

## 2017-09-27 RX ADMIN — Medication 300 UNITS: at 10:08

## 2017-10-05 ENCOUNTER — TRANSCRIBE ORDERS (OUTPATIENT)
Dept: ADMINISTRATIVE | Facility: HOSPITAL | Age: 57
End: 2017-10-05

## 2017-10-05 ENCOUNTER — APPOINTMENT (OUTPATIENT)
Dept: LAB | Facility: HOSPITAL | Age: 57
End: 2017-10-05
Payer: MEDICARE

## 2017-10-05 DIAGNOSIS — C18.2 MALIGNANT NEOPLASM OF ASCENDING COLON (HCC): ICD-10-CM

## 2017-10-05 DIAGNOSIS — R16.0 HEPATOMEGALY: Primary | ICD-10-CM

## 2017-10-05 DIAGNOSIS — R16.0 HEPATOMEGALY: ICD-10-CM

## 2017-10-05 DIAGNOSIS — R16.0 HEPATOMEGALY, NOT ELSEWHERE CLASSIFIED: ICD-10-CM

## 2017-10-05 DIAGNOSIS — R91.8 OTHER NONSPECIFIC ABNORMAL FINDING OF LUNG FIELD: ICD-10-CM

## 2017-10-05 DIAGNOSIS — R91.8 LUNG MASS: ICD-10-CM

## 2017-10-05 LAB
ALBUMIN SERPL BCP-MCNC: 3.4 G/DL (ref 3.5–5)
ALP SERPL-CCNC: 156 U/L (ref 46–116)
ALT SERPL W P-5'-P-CCNC: 20 U/L (ref 12–78)
ANION GAP SERPL CALCULATED.3IONS-SCNC: 11 MMOL/L (ref 4–13)
AST SERPL W P-5'-P-CCNC: 7 U/L (ref 5–45)
BACTERIA UR QL AUTO: ABNORMAL /HPF
BASOPHILS # BLD AUTO: 0 THOUSANDS/ΜL (ref 0–0.1)
BASOPHILS NFR BLD AUTO: 0 % (ref 0–1)
BILIRUB SERPL-MCNC: 0.3 MG/DL (ref 0.2–1)
BILIRUB UR QL STRIP: NEGATIVE
BUN SERPL-MCNC: 11 MG/DL (ref 5–25)
CALCIUM SERPL-MCNC: 9 MG/DL (ref 8.3–10.1)
CHLORIDE SERPL-SCNC: 103 MMOL/L (ref 100–108)
CLARITY UR: CLEAR
CO2 SERPL-SCNC: 26 MMOL/L (ref 21–32)
COLOR UR: YELLOW
CREAT SERPL-MCNC: 0.81 MG/DL (ref 0.6–1.3)
EOSINOPHIL # BLD AUTO: 0.1 THOUSAND/ΜL (ref 0–0.61)
EOSINOPHIL NFR BLD AUTO: 1 % (ref 0–6)
ERYTHROCYTE [DISTWIDTH] IN BLOOD BY AUTOMATED COUNT: 17.3 % (ref 11.6–15.1)
GFR SERPL CREATININE-BSD FRML MDRD: 99 ML/MIN/1.73SQ M
GLUCOSE P FAST SERPL-MCNC: 202 MG/DL (ref 65–99)
GLUCOSE UR STRIP-MCNC: ABNORMAL MG/DL
HCT VFR BLD AUTO: 39.2 % (ref 42–52)
HGB BLD-MCNC: 13.3 G/DL (ref 14–18)
HGB UR QL STRIP.AUTO: NEGATIVE
KETONES UR STRIP-MCNC: ABNORMAL MG/DL
LEUKOCYTE ESTERASE UR QL STRIP: NEGATIVE
LYMPHOCYTES # BLD AUTO: 2.6 THOUSANDS/ΜL (ref 0.6–4.47)
LYMPHOCYTES NFR BLD AUTO: 41 % (ref 14–44)
MCH RBC QN AUTO: 31.5 PG (ref 27–31)
MCHC RBC AUTO-ENTMCNC: 33.9 G/DL (ref 31.4–37.4)
MCV RBC AUTO: 93 FL (ref 82–98)
MONOCYTES # BLD AUTO: 0.9 THOUSAND/ΜL (ref 0.17–1.22)
MONOCYTES NFR BLD AUTO: 14 % (ref 4–12)
MUCOUS THREADS UR QL AUTO: ABNORMAL
NEUTROPHILS # BLD AUTO: 2.7 THOUSANDS/ΜL (ref 1.85–7.62)
NEUTS SEG NFR BLD AUTO: 43 % (ref 43–75)
NITRITE UR QL STRIP: NEGATIVE
NON-SQ EPI CELLS URNS QL MICRO: ABNORMAL /HPF
NRBC BLD AUTO-RTO: 0 /100 WBCS
PH UR STRIP.AUTO: 5.5 [PH] (ref 5–9)
PLATELET # BLD AUTO: 171 THOUSANDS/UL (ref 130–400)
PMV BLD AUTO: 6.4 FL (ref 8.9–12.7)
POTASSIUM SERPL-SCNC: 3.3 MMOL/L (ref 3.5–5.3)
PROT SERPL-MCNC: 7.2 G/DL (ref 6.4–8.2)
PROT UR STRIP-MCNC: ABNORMAL MG/DL
RBC # BLD AUTO: 4.21 MILLION/UL (ref 4.7–6.1)
RBC #/AREA URNS AUTO: ABNORMAL /HPF
SODIUM SERPL-SCNC: 140 MMOL/L (ref 136–145)
SP GR UR STRIP.AUTO: 1.02 (ref 1–1.03)
UROBILINOGEN UR QL STRIP.AUTO: 1 E.U./DL
WBC # BLD AUTO: 6.2 THOUSAND/UL (ref 4.8–10.8)
WBC #/AREA URNS AUTO: ABNORMAL /HPF

## 2017-10-05 PROCEDURE — 36415 COLL VENOUS BLD VENIPUNCTURE: CPT

## 2017-10-05 PROCEDURE — 85025 COMPLETE CBC W/AUTO DIFF WBC: CPT

## 2017-10-05 PROCEDURE — 81001 URINALYSIS AUTO W/SCOPE: CPT | Performed by: PHYSICIAN ASSISTANT

## 2017-10-05 PROCEDURE — 80053 COMPREHEN METABOLIC PANEL: CPT

## 2017-10-05 RX ORDER — FLUOROURACIL 50 MG/ML
662 INJECTION, SOLUTION INTRAVENOUS ONCE
Status: COMPLETED | OUTPATIENT
Start: 2017-10-09 | End: 2017-10-09

## 2017-10-05 RX ORDER — DEXTROSE MONOHYDRATE 50 MG/ML
20 INJECTION, SOLUTION INTRAVENOUS ONCE
Status: COMPLETED | OUTPATIENT
Start: 2017-10-09 | End: 2017-10-09

## 2017-10-05 RX ORDER — SODIUM CHLORIDE 9 MG/ML
20 INJECTION, SOLUTION INTRAVENOUS ONCE
Status: COMPLETED | OUTPATIENT
Start: 2017-10-09 | End: 2017-10-09

## 2017-10-06 ENCOUNTER — GENERIC CONVERSION - ENCOUNTER (OUTPATIENT)
Dept: OTHER | Facility: OTHER | Age: 57
End: 2017-10-06

## 2017-10-09 ENCOUNTER — HOSPITAL ENCOUNTER (OUTPATIENT)
Dept: INFUSION CENTER | Facility: HOSPITAL | Age: 57
Discharge: HOME | End: 2017-10-09
Payer: MEDICARE

## 2017-10-09 VITALS
HEIGHT: 66 IN | SYSTOLIC BLOOD PRESSURE: 120 MMHG | RESPIRATION RATE: 24 BRPM | BODY MASS INDEX: 35.57 KG/M2 | DIASTOLIC BLOOD PRESSURE: 77 MMHG | OXYGEN SATURATION: 92 % | TEMPERATURE: 98.5 F | WEIGHT: 221.34 LBS | HEART RATE: 82 BPM

## 2017-10-09 PROCEDURE — G0498 CHEMO EXTEND IV INFUS W/PUMP: HCPCS

## 2017-10-09 PROCEDURE — 96411 CHEMO IV PUSH ADDL DRUG: CPT

## 2017-10-09 PROCEDURE — 96367 TX/PROPH/DG ADDL SEQ IV INF: CPT

## 2017-10-09 PROCEDURE — 96413 CHEMO IV INFUSION 1 HR: CPT

## 2017-10-09 PROCEDURE — 96368 THER/DIAG CONCURRENT INF: CPT

## 2017-10-09 PROCEDURE — 96417 CHEMO IV INFUS EACH ADDL SEQ: CPT

## 2017-10-09 PROCEDURE — 96415 CHEMO IV INFUSION ADDL HR: CPT

## 2017-10-09 RX ADMIN — DEXAMETHASONE SODIUM PHOSPHATE: 10 INJECTION, SOLUTION INTRAMUSCULAR; INTRAVENOUS at 08:16

## 2017-10-09 RX ADMIN — OXALIPLATIN 150 MG: 5 INJECTION, SOLUTION, CONCENTRATE INTRAVENOUS at 09:35

## 2017-10-09 RX ADMIN — FLUOROURACIL 662 MG: 50 INJECTION, SOLUTION INTRAVENOUS at 11:51

## 2017-10-09 RX ADMIN — SODIUM CHLORIDE 20 ML/HR: 0.9 INJECTION, SOLUTION INTRAVENOUS at 08:15

## 2017-10-09 RX ADMIN — DEXTROSE 20 ML/HR: 50 INJECTION, SOLUTION INTRAVENOUS at 09:33

## 2017-10-09 RX ADMIN — LEUCOVORIN CALCIUM 850 MG: 200 INJECTION, POWDER, LYOPHILIZED, FOR SOLUTION INTRAMUSCULAR; INTRAVENOUS at 09:34

## 2017-10-09 RX ADMIN — BEVACIZUMAB 500 MG: 400 INJECTION, SOLUTION INTRAVENOUS at 08:49

## 2017-10-11 ENCOUNTER — HOSPITAL ENCOUNTER (OUTPATIENT)
Dept: INFUSION CENTER | Facility: HOSPITAL | Age: 57
Discharge: HOME | End: 2017-10-11
Payer: MEDICARE

## 2017-10-11 VITALS
DIASTOLIC BLOOD PRESSURE: 88 MMHG | SYSTOLIC BLOOD PRESSURE: 163 MMHG | RESPIRATION RATE: 24 BRPM | HEART RATE: 94 BPM | TEMPERATURE: 97.2 F | OXYGEN SATURATION: 93 %

## 2017-10-11 RX ADMIN — Medication 300 UNITS: at 10:09

## 2017-10-11 NOTE — PLAN OF CARE
INFECTION - ADULT     Absence or prevention of progression during hospitalization Progressing     Absence of fever/infection during neutropenic period Progressing        PAIN - ADULT     Verbalizes/displays adequate comfort level or baseline comfort level Progressing        Potential for Falls     Patient will remain free of falls Progressing

## 2017-10-16 ENCOUNTER — GENERIC CONVERSION - ENCOUNTER (OUTPATIENT)
Dept: OTHER | Facility: OTHER | Age: 57
End: 2017-10-16

## 2017-10-20 ENCOUNTER — GENERIC CONVERSION - ENCOUNTER (OUTPATIENT)
Dept: OTHER | Facility: OTHER | Age: 57
End: 2017-10-20

## 2017-10-20 ENCOUNTER — APPOINTMENT (OUTPATIENT)
Dept: LAB | Facility: HOSPITAL | Age: 57
End: 2017-10-20
Payer: MEDICARE

## 2017-10-20 ENCOUNTER — TRANSCRIBE ORDERS (OUTPATIENT)
Dept: ADMINISTRATIVE | Facility: HOSPITAL | Age: 57
End: 2017-10-20

## 2017-10-20 DIAGNOSIS — R91.8 OTHER NONSPECIFIC ABNORMAL FINDING OF LUNG FIELD (CODE): ICD-10-CM

## 2017-10-20 DIAGNOSIS — C18.2 MALIGNANT NEOPLASM OF ASCENDING COLON (HCC): ICD-10-CM

## 2017-10-20 DIAGNOSIS — C18.2 MALIGNANT NEOPLASM OF ASCENDING COLON (HCC): Primary | ICD-10-CM

## 2017-10-20 DIAGNOSIS — R16.0 HEPATOMEGALY, NOT ELSEWHERE CLASSIFIED: ICD-10-CM

## 2017-10-20 LAB
ALBUMIN SERPL BCP-MCNC: 3.1 G/DL (ref 3.5–5)
ALP SERPL-CCNC: 141 U/L (ref 46–116)
ALT SERPL W P-5'-P-CCNC: 27 U/L (ref 12–78)
ANION GAP SERPL CALCULATED.3IONS-SCNC: 8 MMOL/L (ref 4–13)
AST SERPL W P-5'-P-CCNC: 13 U/L (ref 5–45)
BACTERIA UR QL AUTO: ABNORMAL /HPF
BASOPHILS # BLD AUTO: 0 THOUSANDS/ΜL (ref 0–0.1)
BASOPHILS NFR BLD AUTO: 0 % (ref 0–1)
BILIRUB SERPL-MCNC: 0.2 MG/DL (ref 0.2–1)
BILIRUB UR QL STRIP: NEGATIVE
BUN SERPL-MCNC: 12 MG/DL (ref 5–25)
CALCIUM SERPL-MCNC: 8.8 MG/DL (ref 8.3–10.1)
CHLORIDE SERPL-SCNC: 103 MMOL/L (ref 100–108)
CLARITY UR: CLEAR
CO2 SERPL-SCNC: 29 MMOL/L (ref 21–32)
COLOR UR: YELLOW
CREAT SERPL-MCNC: 0.79 MG/DL (ref 0.6–1.3)
EOSINOPHIL # BLD AUTO: 0.1 THOUSAND/ΜL (ref 0–0.61)
EOSINOPHIL NFR BLD AUTO: 1 % (ref 0–6)
ERYTHROCYTE [DISTWIDTH] IN BLOOD BY AUTOMATED COUNT: 17 % (ref 11.6–15.1)
GFR SERPL CREATININE-BSD FRML MDRD: 100 ML/MIN/1.73SQ M
GLUCOSE P FAST SERPL-MCNC: 273 MG/DL (ref 65–99)
GLUCOSE UR STRIP-MCNC: ABNORMAL MG/DL
HCT VFR BLD AUTO: 37.1 % (ref 42–52)
HGB BLD-MCNC: 12.3 G/DL (ref 14–18)
HGB UR QL STRIP.AUTO: NEGATIVE
KETONES UR STRIP-MCNC: NEGATIVE MG/DL
LEUKOCYTE ESTERASE UR QL STRIP: ABNORMAL
LYMPHOCYTES # BLD AUTO: 1.6 THOUSANDS/ΜL (ref 0.6–4.47)
LYMPHOCYTES NFR BLD AUTO: 30 % (ref 14–44)
MCH RBC QN AUTO: 31.2 PG (ref 27–31)
MCHC RBC AUTO-ENTMCNC: 33.1 G/DL (ref 31.4–37.4)
MCV RBC AUTO: 94 FL (ref 82–98)
MONOCYTES # BLD AUTO: 0.7 THOUSAND/ΜL (ref 0.17–1.22)
MONOCYTES NFR BLD AUTO: 13 % (ref 4–12)
NEUTROPHILS # BLD AUTO: 3.1 THOUSANDS/ΜL (ref 1.85–7.62)
NEUTS SEG NFR BLD AUTO: 56 % (ref 43–75)
NITRITE UR QL STRIP: NEGATIVE
NON-SQ EPI CELLS URNS QL MICRO: ABNORMAL /HPF
NRBC BLD AUTO-RTO: 0 /100 WBCS
PH UR STRIP.AUTO: 6 [PH] (ref 5–9)
PLATELET # BLD AUTO: 130 THOUSANDS/UL (ref 130–400)
PMV BLD AUTO: 6.4 FL (ref 8.9–12.7)
POTASSIUM SERPL-SCNC: 3.7 MMOL/L (ref 3.5–5.3)
PROT SERPL-MCNC: 6.8 G/DL (ref 6.4–8.2)
PROT UR STRIP-MCNC: NEGATIVE MG/DL
RBC # BLD AUTO: 3.94 MILLION/UL (ref 4.7–6.1)
RBC #/AREA URNS AUTO: ABNORMAL /HPF
SODIUM SERPL-SCNC: 140 MMOL/L (ref 136–145)
SP GR UR STRIP.AUTO: 1.01 (ref 1–1.03)
UROBILINOGEN UR QL STRIP.AUTO: 1 E.U./DL
WBC # BLD AUTO: 5.4 THOUSAND/UL (ref 4.8–10.8)
WBC #/AREA URNS AUTO: ABNORMAL /HPF

## 2017-10-20 PROCEDURE — 36415 COLL VENOUS BLD VENIPUNCTURE: CPT | Performed by: PHYSICIAN ASSISTANT

## 2017-10-20 PROCEDURE — 81001 URINALYSIS AUTO W/SCOPE: CPT

## 2017-10-20 PROCEDURE — 80053 COMPREHEN METABOLIC PANEL: CPT

## 2017-10-20 PROCEDURE — 85025 COMPLETE CBC W/AUTO DIFF WBC: CPT

## 2017-10-20 RX ORDER — SODIUM CHLORIDE 9 MG/ML
20 INJECTION, SOLUTION INTRAVENOUS ONCE
Status: DISCONTINUED | OUTPATIENT
Start: 2017-10-23 | End: 2017-10-26 | Stop reason: HOSPADM

## 2017-10-20 RX ORDER — DEXTROSE MONOHYDRATE 50 MG/ML
20 INJECTION, SOLUTION INTRAVENOUS ONCE
Status: DISCONTINUED | OUTPATIENT
Start: 2017-10-23 | End: 2017-10-26 | Stop reason: HOSPADM

## 2017-10-20 RX ORDER — FLUOROURACIL 50 MG/ML
662 INJECTION, SOLUTION INTRAVENOUS ONCE
Status: DISCONTINUED | OUTPATIENT
Start: 2017-10-23 | End: 2017-10-26 | Stop reason: HOSPADM

## 2017-10-23 ENCOUNTER — HOSPITAL ENCOUNTER (OUTPATIENT)
Dept: INFUSION CENTER | Facility: HOSPITAL | Age: 57
Discharge: HOME | End: 2017-10-23
Payer: MEDICARE

## 2017-10-25 ENCOUNTER — HOSPITAL ENCOUNTER (OUTPATIENT)
Dept: INFUSION CENTER | Facility: HOSPITAL | Age: 57
Discharge: HOME | End: 2017-10-25
Payer: MEDICARE

## 2017-10-30 ENCOUNTER — APPOINTMENT (EMERGENCY)
Dept: RADIOLOGY | Facility: HOSPITAL | Age: 57
End: 2017-10-30
Payer: MEDICARE

## 2017-10-30 ENCOUNTER — GENERIC CONVERSION - ENCOUNTER (OUTPATIENT)
Dept: OTHER | Facility: OTHER | Age: 57
End: 2017-10-30

## 2017-10-30 ENCOUNTER — HOSPITAL ENCOUNTER (OUTPATIENT)
Facility: HOSPITAL | Age: 57
Setting detail: OBSERVATION
Discharge: LEFT BEFORE DISCHARGE | End: 2017-10-31
Attending: EMERGENCY MEDICINE | Admitting: INTERNAL MEDICINE
Payer: MEDICARE

## 2017-10-30 DIAGNOSIS — R06.00 DOE (DYSPNEA ON EXERTION): ICD-10-CM

## 2017-10-30 DIAGNOSIS — R00.0 TACHYCARDIA: ICD-10-CM

## 2017-10-30 DIAGNOSIS — R06.02 SOB (SHORTNESS OF BREATH): Primary | ICD-10-CM

## 2017-10-30 DIAGNOSIS — C18.9 COLON CANCER (HCC): ICD-10-CM

## 2017-10-30 PROBLEM — R53.1 GENERALIZED WEAKNESS: Status: ACTIVE | Noted: 2017-10-30

## 2017-10-30 PROBLEM — R07.89 CHEST WALL PAIN: Status: ACTIVE | Noted: 2017-10-30

## 2017-10-30 PROBLEM — R52 ACUTE PAIN: Status: ACTIVE | Noted: 2017-10-30

## 2017-10-30 PROBLEM — R63.4 WEIGHT LOSS: Status: ACTIVE | Noted: 2017-10-30

## 2017-10-30 LAB
ALBUMIN SERPL BCP-MCNC: 3.5 G/DL (ref 3.5–5)
ALP SERPL-CCNC: 184 U/L (ref 46–116)
ALT SERPL W P-5'-P-CCNC: 21 U/L (ref 12–78)
ANION GAP SERPL CALCULATED.3IONS-SCNC: 10 MMOL/L (ref 4–13)
APTT PPP: 27 SECONDS (ref 23–35)
ARTERIAL PATENCY WRIST A: YES
AST SERPL W P-5'-P-CCNC: 22 U/L (ref 5–45)
BASE EXCESS BLDA CALC-SCNC: 1.5 MMOL/L
BASOPHILS # BLD AUTO: 0 THOUSANDS/ΜL (ref 0–0.1)
BASOPHILS NFR BLD AUTO: 0 % (ref 0–1)
BILIRUB SERPL-MCNC: 0.7 MG/DL (ref 0.2–1)
BODY TEMPERATURE: 98.5 DEGREES FEHRENHEIT
BUN SERPL-MCNC: 10 MG/DL (ref 5–25)
CALCIUM SERPL-MCNC: 9.8 MG/DL (ref 8.3–10.1)
CHLORIDE SERPL-SCNC: 100 MMOL/L (ref 100–108)
CO2 SERPL-SCNC: 28 MMOL/L (ref 21–32)
CREAT SERPL-MCNC: 0.72 MG/DL (ref 0.6–1.3)
EOSINOPHIL # BLD AUTO: 0.1 THOUSAND/ΜL (ref 0–0.61)
EOSINOPHIL NFR BLD AUTO: 2 % (ref 0–6)
ERYTHROCYTE [DISTWIDTH] IN BLOOD BY AUTOMATED COUNT: 15.9 % (ref 11.6–15.1)
GFR SERPL CREATININE-BSD FRML MDRD: 104 ML/MIN/1.73SQ M
GLUCOSE SERPL-MCNC: 134 MG/DL (ref 65–140)
GLUCOSE SERPL-MCNC: 135 MG/DL (ref 65–140)
GLUCOSE SERPL-MCNC: 301 MG/DL (ref 65–140)
HCO3 BLDA-SCNC: 25.7 MMOL/L (ref 22–28)
HCT VFR BLD AUTO: 44.5 % (ref 42–52)
HGB BLD-MCNC: 14.9 G/DL (ref 14–18)
INR PPP: 1.05 (ref 0.86–1.16)
LACTATE SERPL-SCNC: 1.2 MMOL/L (ref 0.5–2)
LYMPHOCYTES # BLD AUTO: 1.5 THOUSANDS/ΜL (ref 0.6–4.47)
LYMPHOCYTES NFR BLD AUTO: 23 % (ref 14–44)
MAGNESIUM SERPL-MCNC: 1.8 MG/DL (ref 1.6–2.6)
MCH RBC QN AUTO: 31.4 PG (ref 27–31)
MCHC RBC AUTO-ENTMCNC: 33.5 G/DL (ref 31.4–37.4)
MCV RBC AUTO: 94 FL (ref 82–98)
MONOCYTES # BLD AUTO: 0.6 THOUSAND/ΜL (ref 0.17–1.22)
MONOCYTES NFR BLD AUTO: 10 % (ref 4–12)
NASAL CANNULA: 2
NEUTROPHILS # BLD AUTO: 4.1 THOUSANDS/ΜL (ref 1.85–7.62)
NEUTS SEG NFR BLD AUTO: 65 % (ref 43–75)
NRBC BLD AUTO-RTO: 0 /100 WBCS
O2 CT BLDA-SCNC: 20.5 ML/DL (ref 16–23)
OXYHGB MFR BLDA: 96.7 % (ref 94–97)
PCO2 BLDA: 39 MM HG (ref 36–44)
PCO2 TEMP ADJ BLDA: 38.8 MM HG (ref 36–44)
PH BLD: 7.44 [PH] (ref 7.35–7.45)
PH BLDA: 7.44 [PH] (ref 7.35–7.45)
PLATELET # BLD AUTO: 229 THOUSANDS/UL (ref 130–400)
PMV BLD AUTO: 6.5 FL (ref 8.9–12.7)
PO2 BLD: 188.8 MM HG (ref 75–129)
PO2 BLDA: 189.3 MM HG (ref 75–129)
POTASSIUM SERPL-SCNC: 3.9 MMOL/L (ref 3.5–5.3)
PROT SERPL-MCNC: 8.1 G/DL (ref 6.4–8.2)
PROTHROMBIN TIME: 11 SECONDS (ref 9.4–11.7)
RBC # BLD AUTO: 4.75 MILLION/UL (ref 4.7–6.1)
SODIUM SERPL-SCNC: 138 MMOL/L (ref 136–145)
SPECIMEN SOURCE: ABNORMAL
WBC # BLD AUTO: 6.3 THOUSAND/UL (ref 4.8–10.8)

## 2017-10-30 PROCEDURE — 87798 DETECT AGENT NOS DNA AMP: CPT | Performed by: NURSE PRACTITIONER

## 2017-10-30 PROCEDURE — 36415 COLL VENOUS BLD VENIPUNCTURE: CPT | Performed by: EMERGENCY MEDICINE

## 2017-10-30 PROCEDURE — 82948 REAGENT STRIP/BLOOD GLUCOSE: CPT

## 2017-10-30 PROCEDURE — 71010 HB CHEST X-RAY 1 VIEW FRONTAL (PORTABLE): CPT

## 2017-10-30 PROCEDURE — 93005 ELECTROCARDIOGRAM TRACING: CPT | Performed by: EMERGENCY MEDICINE

## 2017-10-30 PROCEDURE — 96361 HYDRATE IV INFUSION ADD-ON: CPT

## 2017-10-30 PROCEDURE — 94760 N-INVAS EAR/PLS OXIMETRY 1: CPT

## 2017-10-30 PROCEDURE — 83605 ASSAY OF LACTIC ACID: CPT | Performed by: EMERGENCY MEDICINE

## 2017-10-30 PROCEDURE — 94660 CPAP INITIATION&MGMT: CPT

## 2017-10-30 PROCEDURE — 85730 THROMBOPLASTIN TIME PARTIAL: CPT | Performed by: EMERGENCY MEDICINE

## 2017-10-30 PROCEDURE — 87040 BLOOD CULTURE FOR BACTERIA: CPT | Performed by: EMERGENCY MEDICINE

## 2017-10-30 PROCEDURE — 80053 COMPREHEN METABOLIC PANEL: CPT | Performed by: EMERGENCY MEDICINE

## 2017-10-30 PROCEDURE — 96374 THER/PROPH/DIAG INJ IV PUSH: CPT

## 2017-10-30 PROCEDURE — 82805 BLOOD GASES W/O2 SATURATION: CPT | Performed by: EMERGENCY MEDICINE

## 2017-10-30 PROCEDURE — 83735 ASSAY OF MAGNESIUM: CPT | Performed by: NURSE PRACTITIONER

## 2017-10-30 PROCEDURE — 85610 PROTHROMBIN TIME: CPT | Performed by: EMERGENCY MEDICINE

## 2017-10-30 PROCEDURE — 71275 CT ANGIOGRAPHY CHEST: CPT

## 2017-10-30 PROCEDURE — 94640 AIRWAY INHALATION TREATMENT: CPT

## 2017-10-30 PROCEDURE — 36600 WITHDRAWAL OF ARTERIAL BLOOD: CPT

## 2017-10-30 PROCEDURE — 87081 CULTURE SCREEN ONLY: CPT | Performed by: NURSE PRACTITIONER

## 2017-10-30 PROCEDURE — 99285 EMERGENCY DEPT VISIT HI MDM: CPT

## 2017-10-30 PROCEDURE — 85025 COMPLETE CBC W/AUTO DIFF WBC: CPT | Performed by: EMERGENCY MEDICINE

## 2017-10-30 RX ORDER — LISINOPRIL 5 MG/1
5 TABLET ORAL EVERY MORNING
Status: DISCONTINUED | OUTPATIENT
Start: 2017-10-31 | End: 2017-10-31 | Stop reason: HOSPADM

## 2017-10-30 RX ORDER — OXYCODONE HYDROCHLORIDE 5 MG/1
5 TABLET ORAL EVERY 6 HOURS PRN
Status: DISCONTINUED | OUTPATIENT
Start: 2017-10-30 | End: 2017-10-31 | Stop reason: HOSPADM

## 2017-10-30 RX ORDER — 0.9 % SODIUM CHLORIDE 0.9 %
3 VIAL (ML) INJECTION AS NEEDED
Status: DISCONTINUED | OUTPATIENT
Start: 2017-10-30 | End: 2017-10-30

## 2017-10-30 RX ORDER — IPRATROPIUM BROMIDE AND ALBUTEROL SULFATE 2.5; .5 MG/3ML; MG/3ML
3 SOLUTION RESPIRATORY (INHALATION)
Status: COMPLETED | OUTPATIENT
Start: 2017-10-30 | End: 2017-10-30

## 2017-10-30 RX ORDER — MAGNESIUM SULFATE 1 G/100ML
1 INJECTION INTRAVENOUS ONCE
Status: COMPLETED | OUTPATIENT
Start: 2017-10-30 | End: 2017-10-31

## 2017-10-30 RX ORDER — METHYLPREDNISOLONE SODIUM SUCCINATE 125 MG/2ML
125 INJECTION, POWDER, LYOPHILIZED, FOR SOLUTION INTRAMUSCULAR; INTRAVENOUS ONCE
Status: COMPLETED | OUTPATIENT
Start: 2017-10-30 | End: 2017-10-30

## 2017-10-30 RX ORDER — TAMSULOSIN HYDROCHLORIDE 0.4 MG/1
0.4 CAPSULE ORAL
Status: DISCONTINUED | OUTPATIENT
Start: 2017-10-30 | End: 2017-10-31 | Stop reason: HOSPADM

## 2017-10-30 RX ORDER — CLOPIDOGREL BISULFATE 75 MG/1
75 TABLET ORAL DAILY
COMMUNITY

## 2017-10-30 RX ORDER — LISINOPRIL 10 MG/1
10 TABLET ORAL EVERY MORNING
Status: DISCONTINUED | OUTPATIENT
Start: 2017-10-31 | End: 2017-10-30

## 2017-10-30 RX ORDER — OXYCODONE HYDROCHLORIDE 10 MG/1
10 TABLET ORAL EVERY 6 HOURS PRN
Status: DISCONTINUED | OUTPATIENT
Start: 2017-10-30 | End: 2017-10-31 | Stop reason: HOSPADM

## 2017-10-30 RX ORDER — PAROXETINE HYDROCHLORIDE 20 MG/1
30 TABLET, FILM COATED ORAL
Status: DISCONTINUED | OUTPATIENT
Start: 2017-10-30 | End: 2017-10-31 | Stop reason: HOSPADM

## 2017-10-30 RX ORDER — CLINDAMYCIN HYDROCHLORIDE 300 MG/1
300 CAPSULE ORAL 3 TIMES DAILY
Status: ON HOLD | COMMUNITY
End: 2018-01-04

## 2017-10-30 RX ORDER — GUAIFENESIN/DEXTROMETHORPHAN 100-10MG/5
10 SYRUP ORAL EVERY 4 HOURS PRN
Status: DISCONTINUED | OUTPATIENT
Start: 2017-10-30 | End: 2017-10-31 | Stop reason: HOSPADM

## 2017-10-30 RX ORDER — BACLOFEN 10 MG/1
5 TABLET ORAL 2 TIMES DAILY
Status: DISCONTINUED | OUTPATIENT
Start: 2017-10-30 | End: 2017-10-31 | Stop reason: HOSPADM

## 2017-10-30 RX ORDER — LEVALBUTEROL INHALATION SOLUTION 0.63 MG/3ML
0.63 SOLUTION RESPIRATORY (INHALATION) EVERY 4 HOURS PRN
Status: DISCONTINUED | OUTPATIENT
Start: 2017-10-30 | End: 2017-10-31 | Stop reason: HOSPADM

## 2017-10-30 RX ORDER — ISOSORBIDE MONONITRATE 30 MG/1
30 TABLET, EXTENDED RELEASE ORAL EVERY MORNING
Status: DISCONTINUED | OUTPATIENT
Start: 2017-10-30 | End: 2017-10-31 | Stop reason: HOSPADM

## 2017-10-30 RX ORDER — MAGNESIUM HYDROXIDE/ALUMINUM HYDROXICE/SIMETHICONE 120; 1200; 1200 MG/30ML; MG/30ML; MG/30ML
30 SUSPENSION ORAL EVERY 6 HOURS PRN
Status: DISCONTINUED | OUTPATIENT
Start: 2017-10-30 | End: 2017-10-31 | Stop reason: HOSPADM

## 2017-10-30 RX ORDER — ONDANSETRON 2 MG/ML
4 INJECTION INTRAMUSCULAR; INTRAVENOUS EVERY 6 HOURS PRN
Status: DISCONTINUED | OUTPATIENT
Start: 2017-10-30 | End: 2017-10-31 | Stop reason: HOSPADM

## 2017-10-30 RX ORDER — ACETAMINOPHEN 325 MG/1
650 TABLET ORAL EVERY 6 HOURS PRN
Status: DISCONTINUED | OUTPATIENT
Start: 2017-10-30 | End: 2017-10-31 | Stop reason: HOSPADM

## 2017-10-30 RX ORDER — PRAVASTATIN SODIUM 40 MG
40 TABLET ORAL
Status: DISCONTINUED | OUTPATIENT
Start: 2017-10-30 | End: 2017-10-31 | Stop reason: HOSPADM

## 2017-10-30 RX ORDER — OXYCODONE HCL 5 MG/5 ML
10 SOLUTION, ORAL ORAL ONCE
Status: COMPLETED | OUTPATIENT
Start: 2017-10-30 | End: 2017-10-30

## 2017-10-30 RX ORDER — FAMOTIDINE 40 MG/1
40 TABLET, FILM COATED ORAL DAILY
COMMUNITY

## 2017-10-30 RX ORDER — FAMOTIDINE 20 MG/1
40 TABLET, FILM COATED ORAL DAILY
Status: DISCONTINUED | OUTPATIENT
Start: 2017-10-30 | End: 2017-10-31 | Stop reason: HOSPADM

## 2017-10-30 RX ORDER — CLOPIDOGREL BISULFATE 75 MG/1
75 TABLET ORAL DAILY
Status: DISCONTINUED | OUTPATIENT
Start: 2017-10-30 | End: 2017-10-31 | Stop reason: HOSPADM

## 2017-10-30 RX ORDER — GLIPIZIDE 5 MG/1
10 TABLET ORAL
Status: DISCONTINUED | OUTPATIENT
Start: 2017-10-31 | End: 2017-10-31 | Stop reason: HOSPADM

## 2017-10-30 RX ADMIN — IPRATROPIUM BROMIDE AND ALBUTEROL SULFATE 3 ML: .5; 3 SOLUTION RESPIRATORY (INHALATION) at 16:20

## 2017-10-30 RX ADMIN — MAGNESIUM SULFATE HEPTAHYDRATE 1 G: 1 INJECTION, SOLUTION INTRAVENOUS at 23:54

## 2017-10-30 RX ADMIN — PRAVASTATIN SODIUM 40 MG: 40 TABLET ORAL at 21:43

## 2017-10-30 RX ADMIN — IPRATROPIUM BROMIDE AND ALBUTEROL SULFATE 3 ML: .5; 3 SOLUTION RESPIRATORY (INHALATION) at 17:19

## 2017-10-30 RX ADMIN — CLOPIDOGREL BISULFATE 75 MG: 75 TABLET ORAL at 21:44

## 2017-10-30 RX ADMIN — IPRATROPIUM BROMIDE AND ALBUTEROL SULFATE 3 ML: .5; 3 SOLUTION RESPIRATORY (INHALATION) at 17:50

## 2017-10-30 RX ADMIN — OXYCODONE HYDROCHLORIDE 10 MG: 10 TABLET ORAL at 23:53

## 2017-10-30 RX ADMIN — FAMOTIDINE 40 MG: 20 TABLET ORAL at 21:44

## 2017-10-30 RX ADMIN — SODIUM CHLORIDE 1000 ML: 0.9 INJECTION, SOLUTION INTRAVENOUS at 16:07

## 2017-10-30 RX ADMIN — BACLOFEN 5 MG: 10 TABLET ORAL at 21:43

## 2017-10-30 RX ADMIN — METOPROLOL TARTRATE 25 MG: 25 TABLET ORAL at 21:42

## 2017-10-30 RX ADMIN — INSULIN LISPRO 3 UNITS: 100 INJECTION, SOLUTION INTRAVENOUS; SUBCUTANEOUS at 22:32

## 2017-10-30 RX ADMIN — INSULIN DETEMIR 40 UNITS: 100 INJECTION, SOLUTION SUBCUTANEOUS at 22:32

## 2017-10-30 RX ADMIN — OXYCODONE HYDROCHLORIDE 10 MG: 5 SOLUTION ORAL at 17:23

## 2017-10-30 RX ADMIN — PAROXETINE HYDROCHLORIDE 30 MG: 20 TABLET, FILM COATED ORAL at 21:43

## 2017-10-30 RX ADMIN — METHYLPREDNISOLONE SODIUM SUCCINATE 125 MG: 125 INJECTION, POWDER, FOR SOLUTION INTRAMUSCULAR; INTRAVENOUS at 16:20

## 2017-10-30 RX ADMIN — TAMSULOSIN HYDROCHLORIDE 0.4 MG: 0.4 CAPSULE ORAL at 21:43

## 2017-10-30 RX ADMIN — IOHEXOL 80 ML: 350 INJECTION, SOLUTION INTRAVENOUS at 17:18

## 2017-10-30 RX ADMIN — ISOSORBIDE MONONITRATE 30 MG: 30 TABLET, EXTENDED RELEASE ORAL at 21:44

## 2017-10-30 NOTE — ED PROVIDER NOTES
History  Chief Complaint   Patient presents with    Generalized Body Aches     generalized body pains, nausea, states history of liver cancer with mets       History provided by:  Patient   used: No    Generalized Body Aches   Location:  Generalized  Quality:  Weakness, fatigue, chronic pain > LLE s/t vascular issues  Severity:  Severe  Onset quality:  Gradual  Timing:  Constant  Progression:  Unchanged  Chronicity:  Chronic  Context:  H/o active metastatic CA, chronic pain syndrome, recent URI symptoms  Relieved by:  Mildly with Oxycodone - home analgesic  Worsened by: Activity  Ineffective treatments:  Rest  Associated symptoms: congestion, cough, fatigue, fever, myalgias, shortness of breath and wheezing    Associated symptoms: no abdominal pain, no chest pain, no diarrhea, no headaches, no loss of consciousness, no nausea, no rash, no rhinorrhea, no sore throat and no vomiting    Associated symptoms comment:  Subjective fevers  Cough productive of yellow sputum  Worsening MCKEON  Decreased exercise tolerance      Prior to Admission Medications   Prescriptions Last Dose Informant Patient Reported? Taking? CANDIDO MICROLET LANCETS lancets   Yes No   Sig: Candido Microlet Lancets Miscellaneous USE AS DIRECTED  Quantity: 1;  Refills: 6    Rona CHAMORRO ;  Started 2014 [id number] EA Package   Insulin Pen Needle (RELION SHORT PEN NEEDLES) 31G X 8 MM MISC   Yes No   Sig: ReliOn Short Pen Needles 31G X 8 MM Miscellaneous USE AS DIRECTED ONCE DAILY AT BEDTIME  Quantity: 50;  Refills: 1    Terrance CHAMORRO ;  Started 2014 Active   PARoxetine (PAXIL) 30 mg tablet   Yes No   Si mg daily at bedtime PARoxetine HCl - 30 MG Oral Tablet TAKE ONE TABLET (30MG) BY MOUTH ONCE DAILY  Quantity: 30;  Refills: 5    Annemarie Iván CHAMORRO O ;  Started 5-Dec-2014 Active    SURE COMFORT LANCETS 30G MISC   Yes No   Sig: Sure Comfort Lancets 30G Miscellaneous USE AS DIRECTED    Quantity: 1;  Refills: 0 Gisela CHAMORRO ; Luis White Miscellaneous Box   Tobramycin 28 MG CAPS   Yes Yes   Sig: Take by mouth 3 (three) times a day   albuterol (2 5 mg/3 mL) 0 083 % nebulizer solution   Yes No   Sig: Albuterol Sulfate (2 5 MG/3ML) 0 083% Inhalation Nebulization Solution USE 1 UNIT DOSE EVERY 4-6 HOURS AS NEEDED FOR WHEEZING   Quantity: 1;  Refills: 3    Gene CHAMORRO ;  Started 2014 Active3 ML Angela    albuterol [facility] HFA) 90 mcg/act inhaler   Yes No   Si puff 2 (two) times a day ProAir  (90 Base) MCG/ACT Inhalation Aerosol Solution INHALE ONE TO TWO PUFFS BY MOUTH EVERY 4 TO 6 HOURS AS NEEDED  Quantity: 18; Refills: 5    Charlie CHAMORRO ;  Started 16-Oct-2014 Active    baclofen 10 mg tablet   Yes No   Sig: Take 10 mg by mouth 2 (two) times a day as needed for muscle spasms  clopidogrel (PLAVIX) 75 mg tablet   Yes Yes   Sig: Take 75 mg by mouth daily   glipiZIDE (GLUCOTROL) 10 mg tablet   Yes No   Sig: GlipiZIDE 10 MG Oral Tablet TAKE TWO TABLETS BY MOUTH TWICE DAILY  Quantity: 120;  Refills: 5    Charlie CHAMORRO ;  Started  Active   insulin detemir (LEVEMIR FLEXPEN) 100 Units/mL subcutaneous injection pen   Yes No   Si Units daily at bedtime Levemir FlexPen 100 UNIT/ML SOPN USE AS DIRECTED  INJECT 40 UNITS SUBCUTANEOUSLY AT BEDTIME  Quantity: 1;  Refills: 5    Dovie Primrose M D ;  Started 27-Aug-2014 Active3 ML Pen (5 Pens)    isosorbide mononitrate (IMDUR) 30 mg 24 hr tablet   No No   Sig: Take 1 tablet by mouth daily for 30 days   Patient taking differently: Take 30 mg by mouth every morning     lisinopril (ZESTRIL) 10 mg tablet   Yes No   Sig: Take 10 mg by mouth every morning   lovastatin (MEVACOR) 40 MG tablet   Yes No   Sig: Take 40 mg by mouth daily at bedtime   metFORMIN (GLUCOPHAGE) 500 mg tablet   Yes No   Sig: Take 500 mg by mouth 2 (two) times a day with meals     metoprolol tartrate (LOPRESSOR) 25 mg tablet   Yes No   Sig: Take 25 mg by mouth every morning omeprazole (PriLOSEC) 40 MG capsule   Yes No   Sig: Take 40 mg by mouth every morning   oxyCODONE (OXY-IR) 5 MG capsule   No No   Sig: Take 1 5 tablets every 6 hours as needed  Patient taking differently: every 6 (six) hours as needed     tamsulosin (FLOMAX) 0 4 mg   Yes No   Sig: Take 0 4 mg by mouth every morning     tiotropium (SPIRIVA) 18 mcg inhalation capsule   Yes No   Sig: Place 18 mcg into inhaler and inhale daily      Facility-Administered Medications: None       Past Medical History:   Diagnosis Date    Anxiety     CAD (coronary artery disease)     Cancer ([place] [address] )     lung-right    Cardiac disease     Chest pain     small mass in the heart-found on a stress test 5/18/17    [facility]     CPAP (continuous positive airway pressure) dependence     Diabetes mellitus ([place] [address] )     Hyperlipidemia     Hypertension     Myocardial infarction 2008    Obstructive sleep apnea on CPAP     CARLOS (obstructive sleep apnea)     Spinal stenosis     Stroke ([place] [address] ) 2008    heat stroke, minor stroke       Past Surgical History:   Procedure Laterality Date    ANGIOPLASTY  2009    2 stents    APPENDECTOMY      COLON SURGERY  2010    16 inches removed-obstruction    COLONOSCOPY      CORONARY STENT PLACEMENT  2009    ND COLSC FLX W/RMVL OF TUMOR POLYP LESION SNARE TQ N/A 5/23/2017    Procedure: COLONOSCOPY and biopsy and snare polypectomy ;  Surgeon: Bria Case MD;  Location: [facility]; Service: Gastroenterology    ND EGD TRANSORAL BIOPSY SINGLE/MULTIPLE N/A 5/23/2017    Procedure: ESOPHAGOGASTRODUODENOSCOPY (EGD) and biopsy ;  Surgeon: Bria Case MD;  Location: [facility];   Service: Gastroenterology    TONSILLECTOMY         Family History   Problem Relation Age of Onset    Heart disease Mother      pacemaker    Diabetes Mother     COPD Father     Diabetes Father     Heart disease Father      pacemaker    Heart disease Brother      massive MI I have reviewed and agree with the history as documented  Social History   Substance Use Topics    Smoking status: Current Every Day Smoker     Types: Cigarettes, E-Cigarettes    Smokeless tobacco: Never Used    Alcohol use No        Review of Systems   Constitutional: Positive for activity change, appetite change, chills, fatigue and fever  Negative for diaphoresis and unexpected weight change  HENT: Positive for congestion  Negative for rhinorrhea, sinus pain, sinus pressure, sore throat and trouble swallowing  Eyes: Negative for pain, discharge, redness and itching  Respiratory: Positive for cough, chest tightness, shortness of breath and wheezing  Cardiovascular: Negative for chest pain, palpitations and leg swelling  Gastrointestinal: Negative for abdominal distention, abdominal pain, blood in stool, diarrhea, nausea and vomiting  Genitourinary: Negative for difficulty urinating, dysuria and flank pain  Musculoskeletal: Positive for arthralgias and myalgias  Negative for back pain, neck pain and neck stiffness  Skin: Negative for color change, pallor, rash and wound  Allergic/Immunologic: Positive for immunocompromised state  History of metastatic cancer, currently on chemo   Neurological: Negative for dizziness, loss of consciousness, syncope, weakness and headaches  Psychiatric/Behavioral: Negative for agitation, behavioral problems and confusion  The patient is nervous/anxious          Physical Exam  ED Triage Vitals [10/30/17 1511]   Temperature Pulse Respirations Blood Pressure SpO2   98 5 °F (36 9 °C) (!) 114 (!) 24 127/82 93 %      Temp Source Heart Rate Source Patient Position - Orthostatic VS BP Location FiO2 (%)   Tympanic Monitor Sitting Left arm --      Pain Score       Worst Possible Pain           Orthostatic Vital Signs  Vitals:    10/30/17 1511 10/30/17 1640   BP: 127/82 148/90   Pulse: (!) 114 (!) 115   Patient Position - Orthostatic VS: Sitting Lying Physical Exam   Constitutional: He is oriented to person, place, and time  He appears well-developed and well-nourished  No distress  Obese male   HENT:   Head: Normocephalic and atraumatic  Mouth/Throat: Oropharynx is clear and moist    Eyes: Conjunctivae and EOM are normal  Pupils are equal, round, and reactive to light  No scleral icterus  Neck: Normal range of motion  Neck supple  Cardiovascular: Regular rhythm and intact distal pulses  Tachycardic  Sinus rhythm   Pulmonary/Chest: Effort normal and breath sounds normal    Abdominal: Soft  He exhibits no distension  There is no tenderness  No hernia  Obese abdomen   Musculoskeletal: Normal range of motion  He exhibits tenderness  He exhibits no edema or deformity  Diffuse lower extremity tenderness without any evidence of edema, erythema, ecchymosis, swelling, crepitus, deformity, lacerations, abrasions   Neurological: He is alert and oriented to person, place, and time  Skin: Skin is warm and dry  He is not diaphoretic  Psychiatric: He has a normal mood and affect  His behavior is normal  Judgment and thought content normal    Nursing note and vitals reviewed        ED Medications  Medications   sodium chloride (PF) 0 9 % injection 3 mL (not administered)   ipratropium-albuterol (DUO-NEB) 0 5-2 5 mg/mL inhalation solution 3 mL (3 mL Nebulization Given 10/30/17 1620)   oxyCODONE (ROXICODONE) oral solution 10 mg (not administered)   sodium chloride 0 9 % bolus 1,000 mL (1,000 mL Intravenous New Bag 10/30/17 1607)   methylPREDNISolone sodium succinate (Solu-MEDROL) injection 125 mg (125 mg Intravenous Given 10/30/17 1620)       Diagnostic Studies  Results Reviewed     Procedure Component Value Units Date/Time    Blood gas, arterial [[id number]]  (Abnormal) Collected:  10/30/17 1622    Lab Status:  Final result Specimen:  Blood, Arterial from Radial, Right Updated:  10/30/17 1643     pH, Arterial 7 436     PH ART TC 7 437     pCO2, Arterial 39 0 mm Hg      PCO2 (TC) Arterial 38 8 mm Hg      pO2, Arterial 189 3 (H) mm Hg      PO2 (TC) Arterial 188 8 (H) mm Hg      HCO3, Arterial 25 7 mmol/L      Base Excess, Arterial 1 5 mmol/L      O2 Content, Arterial 20 5 mL/dL      O2 HGB,Arterial  96 7 %      SOURCE Radial, Right     LORENZO TEST Yes     Temperature 98 5 Degrees Fehrenheit      Nasal Cannula 2    Blood culture #1 [[id number]] Collected:  10/30/17 1606    Lab Status: In process Specimen:  Blood from Arm, Right Updated:  10/30/17 1616    Lactic Acid x2 [[id number]]  (Normal) Collected:  10/30/17 1537    Lab Status:  Final result Specimen:  Blood from Arm, Left Updated:  10/30/17 1614     LACTIC ACID 1 2 mmol/L     Narrative:         Result may be elevated if tourniquet was used during collection  Comprehensive metabolic panel [[id number]]  (Abnormal) Collected:  10/30/17 1537    Lab Status:  Final result Specimen:  Blood from Arm, Left Updated:  10/30/17 1609     Sodium 138 mmol/L      Potassium 3 9 mmol/L      Chloride 100 mmol/L      CO2 28 mmol/L      Anion Gap 10 mmol/L      BUN 10 mg/dL      Creatinine 0 72 mg/dL      Glucose 134 mg/dL      Calcium 9 8 mg/dL      AST 22 U/L      ALT 21 U/L      Alkaline Phosphatase 184 (H) U/L      Total Protein 8 1 g/dL      Albumin 3 5 g/dL      Total Bilirubin 0 70 mg/dL      eGFR 104 ml/min/1 73sq m     Narrative:         National Kidney Disease Education Program recommendations are as follows:  GFR calculation is accurate only with a steady state creatinine  Chronic Kidney disease less than 60 ml/min/1 73 sq  meters  Kidney failure less than 15 ml/min/1 73 sq  meters      Protime-INR [[id number]]  (Normal) Collected:  10/30/17 1537    Lab Status:  Final result Specimen:  Blood from Arm, Left Updated:  10/30/17 1606     Protime 11 0 seconds      INR 1 05    APTT [[id number]]  (Normal) Collected:  10/30/17 1537    Lab Status:  Final result Specimen:  Blood from Arm, Left Updated:  10/30/17 1606     PTT 27 seconds Narrative: Therapeutic Heparin Range = 60-90 seconds    Fingerstick Glucose (POCT) [[id number]]  (Normal) Collected:  10/30/17 1550    Lab Status:  Final result Updated:  10/30/17 1552     POC Glucose 135 mg/dl     CBC and differential [[id number]]  (Abnormal) Collected:  10/30/17 1537    Lab Status:  Final result Specimen:  Blood from Arm, Left Updated:  10/30/17 1544     WBC 6 30 Thousand/uL      RBC 4 75 Million/uL      Hemoglobin 14 9 g/dL      Hematocrit 44 5 %      MCV 94 fL      MCH 31 4 (H) pg      MCHC 33 5 g/dL      RDW 15 9 (H) %      MPV 6 5 (L) fL      Platelets 118 Thousands/uL      nRBC 0 /100 WBCs      Neutrophils Relative 65 %      Lymphocytes Relative 23 %      Monocytes Relative 10 %      Eosinophils Relative 2 %      Basophils Relative 0 %      Neutrophils Absolute 4 10 Thousands/µL      Lymphocytes Absolute 1 50 Thousands/µL      Monocytes Absolute 0 60 Thousand/µL      Eosinophils Absolute 0 10 Thousand/µL      Basophils Absolute 0 00 Thousands/µL     Blood culture #2 [[id number]] Collected:  10/30/17 1537    Lab Status: In process Specimen:  Blood from Arm, Left Updated:  10/30/17 1543    UA w Reflex to Microscopic w Reflex to Culture [[id number]]     Lab Status:  No result Specimen:  Urine     Lactic Acid x2 [[id number]]     Lab Status:  No result Specimen:  Blood                  XR chest 1 view portable   Final Result by Ana Reeder MD (10/30 1631)      Stable small bilateral lung nodules in keeping with provided history of metastatic disease  Workstation performed: [id number]         CTA ED chest PE study    (Results Pending)              Procedures  Procedures       Phone Contacts  ED Phone Contact    ED Course  ED Course as of Oct 30 1711   Mon Oct 30, 2017   1616 Dr Roy Paged                                  MDM  Number of Diagnoses or Management Options  MCKEON (dyspnea on exertion): established and worsening  SOB (shortness of breath): established and worsening  Tachycardia: established and worsening  Diagnosis management comments: Rule ACS, Congestive heart failure, PE, pneumonia, pleural effusions, COPD exacerbation,versus worsening tumor burden  - EKG  - Labs, including ABG  - UA  - CXR  - Consider CTA Chest  - O2  - Duonebs  - IV steroids  - IV abx as needed  - Re-eval, dispo       Amount and/or Complexity of Data Reviewed  Clinical lab tests: ordered and reviewed  Tests in the radiology section of CPT®: ordered and reviewed  Tests in the medicine section of CPT®: reviewed and ordered  Decide to obtain previous medical records or to obtain history from someone other than the patient: yes  Review and summarize past medical records: yes  Discuss the patient with other providers: yes (Dr Miguel Ángel Johansen - luis discussed, agree to accept the patient for observation and re-evaluation; however 1 patient was evaluated in the ER by [facility] and he expressed that he does not want to be admitted under the common [facility] doctors and essentially has fired them as his doctors and would like a different position to care for him       Dr Robbin Huber - luis discussed, agree to accept the patient into observation will re-evaluate and managed as needed; will follow-up CTA and anticoagulate as needed)  Independent visualization of images, tracings, or specimens: yes    Risk of Complications, Morbidity, and/or Mortality  Presenting problems: moderate  Diagnostic procedures: moderate  Management options: moderate    Patient Progress  Patient progress: stable    CritCare Time    Disposition  Final diagnoses:   SOB (shortness of breath)   MCKEON (dyspnea on exertion)   Tachycardia     Time reflects when diagnosis was documented in both MDM as applicable and the Disposition within this note     Time User Action Codes Description Comment    10/30/2017  4:42 PM Noreen Estrin Add [R06 02] SOB (shortness of breath)     10/30/2017  4:42 PM Noreen Estrin Add [R06 09] MCKEON (dyspnea on exertion) 10/30/2017  4:43 PM Monalisa Tovar Add [R00 0] Tachycardia       ED Disposition     ED Disposition Condition Comment    Admit  Case was discussed with Dr Hector Obrien and the patient's admission status was agreed to be  to the service of Dr Hector Obrien  Pt refused adm to [facility], case d/w Dr Vita Luevano - accepted Obs placement         Follow-up Information    None       Patient's Medications   Discharge Prescriptions    No medications on file     No discharge procedures on file      ED Provider  Electronically Signed by           Familia Mroales MD  10/30/17 6651

## 2017-10-30 NOTE — Clinical Note
Case was discussed with Dr Christos Brantley and the patient's admission status was agreed to be  to the service of Dr Christos Brantley

## 2017-10-31 VITALS
HEART RATE: 68 BPM | HEIGHT: 66 IN | RESPIRATION RATE: 18 BRPM | DIASTOLIC BLOOD PRESSURE: 80 MMHG | SYSTOLIC BLOOD PRESSURE: 127 MMHG | OXYGEN SATURATION: 95 % | BODY MASS INDEX: 34.72 KG/M2 | TEMPERATURE: 97.6 F | WEIGHT: 216 LBS

## 2017-10-31 LAB
BACTERIA UR QL AUTO: ABNORMAL /HPF
BASOPHILS # BLD AUTO: 0 THOUSANDS/ΜL (ref 0–0.1)
BASOPHILS NFR BLD AUTO: 0 % (ref 0–1)
BILIRUB UR QL STRIP: NEGATIVE
CLARITY UR: CLEAR
COLOR UR: YELLOW
EOSINOPHIL # BLD AUTO: 0 THOUSAND/ΜL (ref 0–0.61)
EOSINOPHIL NFR BLD AUTO: 0 % (ref 0–6)
ERYTHROCYTE [DISTWIDTH] IN BLOOD BY AUTOMATED COUNT: 15.7 % (ref 11.6–15.1)
EST. AVERAGE GLUCOSE BLD GHB EST-MCNC: 143 MG/DL
FLUAV AG SPEC QL: NORMAL
FLUBV AG SPEC QL: NORMAL
GLUCOSE SERPL-MCNC: 150 MG/DL (ref 65–140)
GLUCOSE SERPL-MCNC: 162 MG/DL (ref 65–140)
GLUCOSE SERPL-MCNC: 244 MG/DL (ref 65–140)
GLUCOSE UR STRIP-MCNC: ABNORMAL MG/DL
HBA1C MFR BLD: 6.6 % (ref 4.2–6.3)
HCT VFR BLD AUTO: 39.2 % (ref 42–52)
HGB BLD-MCNC: 13 G/DL (ref 14–18)
HGB UR QL STRIP.AUTO: NEGATIVE
KETONES UR STRIP-MCNC: ABNORMAL MG/DL
LEUKOCYTE ESTERASE UR QL STRIP: ABNORMAL
LYMPHOCYTES # BLD AUTO: 0.8 THOUSANDS/ΜL (ref 0.6–4.47)
LYMPHOCYTES NFR BLD AUTO: 18 % (ref 14–44)
MAGNESIUM SERPL-MCNC: 2.1 MG/DL (ref 1.6–2.6)
MCH RBC QN AUTO: 31.4 PG (ref 27–31)
MCHC RBC AUTO-ENTMCNC: 33.3 G/DL (ref 31.4–37.4)
MCV RBC AUTO: 94 FL (ref 82–98)
MONOCYTES # BLD AUTO: 0.4 THOUSAND/ΜL (ref 0.17–1.22)
MONOCYTES NFR BLD AUTO: 9 % (ref 4–12)
NEUTROPHILS # BLD AUTO: 3.2 THOUSANDS/ΜL (ref 1.85–7.62)
NEUTS SEG NFR BLD AUTO: 73 % (ref 43–75)
NITRITE UR QL STRIP: NEGATIVE
NON-SQ EPI CELLS URNS QL MICRO: ABNORMAL /HPF
NRBC BLD AUTO-RTO: 0 /100 WBCS
PH UR STRIP.AUTO: 7 [PH] (ref 5–9)
PLATELET # BLD AUTO: 238 THOUSANDS/UL (ref 130–400)
PMV BLD AUTO: 6.8 FL (ref 8.9–12.7)
PROT UR STRIP-MCNC: NEGATIVE MG/DL
RBC # BLD AUTO: 4.15 MILLION/UL (ref 4.7–6.1)
RBC #/AREA URNS AUTO: ABNORMAL /HPF
RSV B RNA SPEC QL NAA+PROBE: NORMAL
SP GR UR STRIP.AUTO: 1.01 (ref 1–1.03)
T4 FREE SERPL-MCNC: 1.02 NG/DL (ref 0.76–1.46)
TROPONIN I SERPL-MCNC: <0.02 NG/ML
TROPONIN I SERPL-MCNC: <0.02 NG/ML
TSH SERPL DL<=0.05 MIU/L-ACNC: 1.45 UIU/ML (ref 0.36–3.74)
UROBILINOGEN UR QL STRIP.AUTO: 1 E.U./DL
WBC # BLD AUTO: 4.3 THOUSAND/UL (ref 4.8–10.8)
WBC #/AREA URNS AUTO: ABNORMAL /HPF

## 2017-10-31 PROCEDURE — G8987 SELF CARE CURRENT STATUS: HCPCS

## 2017-10-31 PROCEDURE — 83735 ASSAY OF MAGNESIUM: CPT | Performed by: NURSE PRACTITIONER

## 2017-10-31 PROCEDURE — 84484 ASSAY OF TROPONIN QUANT: CPT | Performed by: NURSE PRACTITIONER

## 2017-10-31 PROCEDURE — G8978 MOBILITY CURRENT STATUS: HCPCS

## 2017-10-31 PROCEDURE — 83036 HEMOGLOBIN GLYCOSYLATED A1C: CPT | Performed by: NURSE PRACTITIONER

## 2017-10-31 PROCEDURE — 82948 REAGENT STRIP/BLOOD GLUCOSE: CPT

## 2017-10-31 PROCEDURE — 84439 ASSAY OF FREE THYROXINE: CPT | Performed by: NURSE PRACTITIONER

## 2017-10-31 PROCEDURE — 97163 PT EVAL HIGH COMPLEX 45 MIN: CPT

## 2017-10-31 PROCEDURE — G8988 SELF CARE GOAL STATUS: HCPCS

## 2017-10-31 PROCEDURE — 85025 COMPLETE CBC W/AUTO DIFF WBC: CPT | Performed by: NURSE PRACTITIONER

## 2017-10-31 PROCEDURE — 84443 ASSAY THYROID STIM HORMONE: CPT | Performed by: NURSE PRACTITIONER

## 2017-10-31 PROCEDURE — 81001 URINALYSIS AUTO W/SCOPE: CPT | Performed by: EMERGENCY MEDICINE

## 2017-10-31 PROCEDURE — G8979 MOBILITY GOAL STATUS: HCPCS

## 2017-10-31 PROCEDURE — 94760 N-INVAS EAR/PLS OXIMETRY 1: CPT

## 2017-10-31 PROCEDURE — 97167 OT EVAL HIGH COMPLEX 60 MIN: CPT

## 2017-10-31 RX ADMIN — ENOXAPARIN SODIUM 40 MG: 40 INJECTION SUBCUTANEOUS at 08:57

## 2017-10-31 RX ADMIN — TIOTROPIUM BROMIDE 18 MCG: 18 CAPSULE ORAL; RESPIRATORY (INHALATION) at 09:02

## 2017-10-31 RX ADMIN — LEVALBUTEROL HYDROCHLORIDE 0.63 MG: 0.63 SOLUTION RESPIRATORY (INHALATION) at 16:28

## 2017-10-31 RX ADMIN — METOPROLOL TARTRATE 25 MG: 25 TABLET ORAL at 08:59

## 2017-10-31 RX ADMIN — INSULIN LISPRO 4 UNITS: 100 INJECTION, SOLUTION INTRAVENOUS; SUBCUTANEOUS at 12:25

## 2017-10-31 RX ADMIN — GLIPIZIDE 10 MG: 5 TABLET ORAL at 08:59

## 2017-10-31 RX ADMIN — OXYCODONE HYDROCHLORIDE 10 MG: 10 TABLET ORAL at 15:11

## 2017-10-31 RX ADMIN — INSULIN LISPRO 2 UNITS: 100 INJECTION, SOLUTION INTRAVENOUS; SUBCUTANEOUS at 08:58

## 2017-10-31 RX ADMIN — OXYCODONE HYDROCHLORIDE 10 MG: 10 TABLET ORAL at 09:00

## 2017-10-31 RX ADMIN — BACLOFEN 5 MG: 10 TABLET ORAL at 09:01

## 2017-10-31 RX ADMIN — CLOPIDOGREL BISULFATE 75 MG: 75 TABLET ORAL at 09:00

## 2017-10-31 RX ADMIN — ISOSORBIDE MONONITRATE 30 MG: 30 TABLET, EXTENDED RELEASE ORAL at 09:00

## 2017-10-31 RX ADMIN — FAMOTIDINE 40 MG: 20 TABLET ORAL at 09:00

## 2017-10-31 RX ADMIN — LISINOPRIL 5 MG: 5 TABLET ORAL at 09:01

## 2017-10-31 NOTE — NUTRITION
10/31/17 1400   Assessment   Timepoint Initial  (Wt loss)   Labs   List Completed Labs (POC: 135-301, A1c: 6 6, alk phos: 184  glipizide, insulin)   Feeding Route   PO Independent   Adequacy of Intake   Nutrition Modality PO  (CCD2)   Intake Meals %   Estimated Calorie Intake %   Estimated Protein Intake  %   Estimated Fluid Intake %   Estimated calorie intake compared to estimated need Pt reports good intakes of breakfast and lunch today  Potential to continue with same  Pt reports that he drinks glucerna QD at home, requesting same during stay  Nutrition Prognosis   Potential Fair   Nutrition Concerns (generalized weakness, last chemo 10/9/17)   Comorbid Concerns (colon CA with mets to live and figueroa, DM)   Nutrition Precautions None   Nutrition Considerations (Education not warranted at present)   PES Statement   Problem Clinical   Weight (3) Unintended weight loss NC-3 2   Related to Catabolic Illness   As evidenced by: Other (Comment)  (Wt down 14 2% x 1 yr, 13 2% x 6 months, stable x 3 months, down 2 7% x 1 month )   Patient Nutrition Goals   Goal weight maintained;meet PO needs   Goal Status initiated   Timeframe to complete goal by next f/u   Recommendations/Interventions   Summary Provided glucerna coupons to pt for when discharged  Interventions Diet: continued as ordered; Supplement initiate   Nutrition Recommendations Continue diet as ordered  (will provide glucerna QD per pt request)   Nutrition Complexity Risk   Nutrition complexity level Moderate risk   Nutrition review: 11/03/17  (po intakes)   Follow up date 11/07/17

## 2017-10-31 NOTE — MALNUTRITION/BMI
This medical record reflects one or more clinical indicators suggestive of malnutrition and/or morbid obesity  Please indicate the one diagnosis below which you feel best reflects the clinical picture  Malnutrition Findings:   chronic illness  malnutrition of mild degree    Mild malnutrition r/t chronic illness as evidenced by pt report of meeting <75% estimated needs, Wt down 14 2% x 1 yr, 13 2% x 6 months, stable x 3 months, down 2 7% x 1 month     BMI Findings:  30-39 9    Body mass index is 34 86 kg/m²  See Nutrition note dated 10/31/17 for additional details  Completed nutrition assessment is viewable in the nutrition documentation

## 2017-10-31 NOTE — OCCUPATIONAL THERAPY NOTE
OT EVALUATION     10/31/17 0935   Note Type   Note type Eval only   Restrictions/Precautions   Other Precautions Droplet precautions; Chair Alarm; Bed Alarm; Fall Risk;Pain   Pain Assessment   Pain Assessment 0-10   Pain Score 8   Pain Type Chronic pain  (sciatic pain per pt)   Pain Location Groin;Leg   Pain Orientation Left   Hospital Pain Intervention(s) Medication (See MAR); Repositioned; Ambulation/increased activity; Distraction; Emotional support   Home Living   Type of Home Apartment  [facility], 3rd floor)   Home Layout Elevator  (3rd floor apt, laundry on 1st floor)   Bathroom Shower/Tub Tub/shower unit   Bathroom Toilet Standard   Bathroom Equipment Grab bars in shower;Grab bars around toilet   [address] Cane;Walker; Wheelchair-electric  (rollator)   Additional Comments Pt uses primarily SPC in home and electric scooter for longer distances  Uses rollator or RW at his brother's home or on "bad days"  Prior Function   Level of [place] Independent with ADLs and functional mobility; Needs assistance with IADLs   Lives With Alone   Receives Help From Family;Friend(s)   ADL Assistance Independent   IADLs Needs assistance  (brother helps with all IADL and driving x 1 year)   Falls in the last 6 months 0   Psychosocial   Psychosocial (WDL) WDL   Subjective   Subjective "I have stabbing pain and I'm going to miss my appointment with pain management tomorrow "   ADL   Where Assessed Chair   Eating Assistance 7  Independent   Grooming Assistance 4  Minimal Assistance   UB Bathing Assistance 4  Minimal Assistance   LB Bathing Assistance 3  Moderate Assistance   [address] 4  [address] 3  Moderate [address]  4  Minimal Assistance   Bed Mobility   Supine to Sit 4  Minimal assistance   Transfers   Sit to Stand 4  Minimal assistance   Stand to Sit 4  Minimal assistance   Stand pivot 4  Minimal assistance Functional Mobility   Functional Mobility 4  Minimal assistance   Additional Comments 30 feet; leans to right due to LLE pain   Additional items Hand hold assistance   Balance   Static Sitting Good   Dynamic Sitting Fair +   Static Standing Fair   Dynamic Standing Fair -   Ambulatory Fair -   Activity Tolerance   Activity Tolerance Patient limited by pain; Patient limited by fatigue   Nurse Made Aware yes   RUE Assessment   RUE Assessment WFL  (gross strength 3+/5)   LUE Assessment   LUE Assessment WFL  (gross strength 3+/5)   Hand Function   Gross Motor Coordination Functional   Fine Motor Coordination Functional   Sensation   Additional Comments reports numbness and tingling LLE   Vision-Basic Assessment   Current Vision Wears glasses only for reading   Patient Visual Report Blurring of print when reading   Cognition   Overall Cognitive Status [facility]   Arousal/Participation Alert; Cooperative   Attention Within functional limits   Orientation Level Oriented X4   Memory Within functional limits   Following Commands Follows all commands and directions without difficulty   Assessment   Limitation Decreased ADL status; Decreased UE strength;Decreased endurance;Decreased self-care trans;Decreased high-level ADLs  (decreased balance)   Prognosis Good   Assessment Patient evaluated by Occupational Therapy  Patient admitted with Generalized weakness  The patients occupational profile, medical and therapy history includes a extensive additional review of physical, cognitive, or psychosocial history related to current functional performance  Comorbidities affecting functional mobility and ADLS include: metastatic colon cancer (currently receiving chemo)  Known mets to liver and lung, CAD, COPD, type 2 diabetes, hypertension, hyperlipidemia, sleep apnea, depression/anxiety    Prior to admission, patient was independent with functional mobility with SPC primarily in home / electric scooter, independent with ADLS, requiring assist for IADLS and ambulating household distance, living alone in an apt with elevator  The evaluation identifies the following performance deficits: weakness, impaired balance, decreased endurance, increased fall risk, decreased ADLS, decreased IADLS, pain, decreased activity tolerance, decreased sensation and decreased strength, that result in activity limitations and/or participation restrictions  This evaluation requires clinical decision making of high complexity, because the patient presents with comorbidites that affect occupational performance and required significant modification of tasks or assistance with consideration of multiple treatment options  The Barthel Index was used as a functional outcome tool presenting with a score of 60, indicating moderate limitations of functional mobility and ADLS  Patient will benefit from skilled Occupational Therapy services to address above deficits and facilitate a safe return to prior level of function  Goals   Patient Goals no pain   STG Time Frame 3-5   Short Term Goal #1 Patient will increase standing tolerance to 4 minutes during functional activity; Patient will increase bed mobility to supervision; Patient will increase functional mobility to and from bathroom with rolling walker with supervision to increase performance with ADLS; Patient will tolerate 10 minutes of UE ROM/strengthening to increase general activity tolerance and performance in ADLS/IADLS; Patient will improve functional activity tolerance to 10 minutes of sustained functional tasks to increase participation in basic self-care and decrease assistance level  LTG Time Frame 10-14   Long Term Goal #1 Patient will increase standing tolerance to 7 minutes during functional activity;  Patient will increase bed mobility to independent; Patient will increase functional mobility to and from bathroom with single point cane independently to increase performance with ADLS; Patient will tolerate 15 minutes of UE ROM/strengthening to increase general activity tolerance and performance in ADLS/IADLS; Patient will improve functional activity tolerance to 15 minutes of sustained functional tasks to increase participation in basic self-care and decrease assistance level  Functional Transfer Goals   Pt Will Perform All Functional Transfers With mod indep; With assistive devices; With good judgment/safety  (LTG - Independent with AD)   ADL Goals   Pt Will Perform Grooming Standing at sink; With min assist  (LTG - Independent)   Pt Will Perform Bathing In shower/tub seat; With min assist  (LTG - Independent)   Pt Will Perform LE Dressing At edge of bed; With stand by assist  (LTG - Independent)   Pt Will Perform Toileting With mod indep  (LTG - Independent)   Plan   Treatment Interventions ADL retraining;Functional transfer training;UE strengthening/ROM; Endurance training;Patient/family training;Equipment evaluation/education; Compensatory technique education; Activityengagement   OT Frequency 2-3x/wk   Recommendation   OT Discharge Recommendation (Home with services)   Barthel Index   Feeding 10   Bathing 0   Grooming Score 0   Dressing Score 5   Bladder Score 10   Bowels Score 10   Toilet Use Score 5   Transfers (Bed/Chair) Score 10   Mobility (Level Surface) Score 5   Stairs Score 5   Barthel Index Score 60     Patient left OOB in chair with all needs within reach, tab alarm in place

## 2017-10-31 NOTE — H&P
History and Physical - [facility] Internal Medicine    Patient Information: Fernando Moncada 65 y o  male MRN: [id number]  Unit/Bed#: [address] [place] [address] Encounter: [id number]  Admitting Physician: EVA Padilla  PCP: Philip Traore  Date of Admission:  10/30/17    Assessment/Plan:    Hospital Problem List:     Principal Problem:    Generalized weakness  Active Problems:    Acute pain    Sinus tachycardia    Weight loss      Plan for the Primary Problem(s):  · Generalized weakness  Most likely secondary to metastatic colon cancer  Known mets to liver and lung  CTA chest PE study today showed osseous metastatic disease in ribs and right scapula which is new  Patient is currently receiving chemotherapy every 2 weeks  Last chemo was on 10/9/2017  Chemo was on hold currently due to "flu" which was diagnosed on 10/16/2017 per patient  Patient was seen in [facility] on 10/16/2017,was diagnosed with acute bronchitis,and was prescribed prednisone taper, Benzonatate and Mucinex, albuterol nebulizer prn  Patient reports taking Clindamycin 300mg po TID since  Office visit,did not take Prednisone taper  Will check influenza and RSV  Will consult Oncology  PT OT eval and treat  Fall precautions  Plan for Additional Problems:   · Acute on chronic pain secondary to metastatic colon cancer,spinal stenosis, sciatic pain  Reports chest wall pain from coughing  CTA chest PE study negative for PE, but showed new osseous metastatic disease in ribs and  right scapular  Robitussin DM p r n  Mariaa Darden Resume oxycodone p r n  from home  Will decrease baclofen to 5 mg p  o  b i d  due to 1  Check troponin x 2  Place Patient on telemetry  Verified with [facility] website  · Sinus tachycardia  Heart rate around 110's  EKG in June 2017 showed ST as well  Will check magnesium  Will increase home medication metoprolol tartrate to 25 mg p o  q 12 hours  Place patient on telemetry    · History of colon cancer with mets to liver and lung, diagnosed in 5/2017, now with osseous metastatic disease  Currently receiving palliative chemo every 2 weeks  Last chemo on 10/9/2017  Will consult Oncology  · Hypertension  Systolic -838'G  Will decrease lisinopril to 5 mg p o  Daily due to increasing metoprolol  Monitor  · T2DM  Glucose WNL  Check A1c  Will continue glipizide, Levemir  Will hold metformin  Accu-Chek a c  and HS with SSI  · CAD with stents x2  Continue Plavix, metoprolol, imdur, lovastatin  · Hyperlipidemia  Continue lovastatin  · History of stroke  Most likely TIA  Continue Plavix, lovastatin  · GERD  Continue Pepcid  · COPD  Continue Spiriva  Will order Xopenex p r n     · CARLOS  Continue CPAP at HS at 16 cm H2O   · BPH  Patient asymptomatic  Continue Flomax  · Depression /anxiety  Continue Paxil  · Weight loss  80 lbs weight loss within 1 year per patient  Will consult dietitian  VTE Prophylaxis: Enoxaparin (Lovenox)  / sequential compression device   Code Status: Full code  POLST: POLST form is not discussed and not completed at this time  Anticipated Length of Stay:  Patient will be admitted on an Observation basis with an anticipated length of stay of  < 2 midnights  Justification for Hospital Stay:  Generalized weakness  Total Time for Visit, including Counseling / Coordination of Care: 1 hour  Greater than 50% of this total time spent on direct patient counseling and coordination of care  Chief Complaint:   Weakness and pain  History of Present Illness:    Kendra Son is a 65 y o  male with PMH of colon cancer , CAD, COPD, type 2 diabetes, hypertension, hyperlipidemia, sleep apnea, depression/anxiety who presents with generalized weakness and pain  He states he has been feeling weak since last chemo, he has chest pain, ribs pain from coughing and has chronic pain in left inguinal region,left buttock down to left leg/sciatic pain and spinal stenosis  He ran out of his oxycodone last night  He sees a pain specialist for spinal stenosis and sciatic pain  The pain specialist prescribes oxycodone for him  He states he had flu infection 2 weeks ago, was [facility] in [facility]  He was prescribed cough medication prn and prednisone  He did not take prednisone, he took clindamycin instead  He reports productive cough with greenish phlegm, had blood tinged sputum a week ago  He denies worsening SOB from baseline  He denies sore throat, reports runny nose  He denies headache, dizziness, diarrhea constipation  He reports fever chills at home 1 week ago  He reports nauseous and vomiting in the morning at times since his last chemo  Reports chronic numbness/tingling in left lower extremity from possible sciatic nerve damage from injection by his pain specialist   He reports generalized pain 8/10 currently  He reports poor appetite for past couple of days  He reports 80 lb weight loss in 1 year  He reports he receives chemo every 2 weeks, has a couple of more session left  Chemo is currently on hold due to recent "flu" infection  In ED, chest x-ray showed stable small bilateral lung nodules  CTA chest PE study negative for PE, pulmonary nodules and mediastinal lymph nodes, left hepatic mass, new osseous metastatic disease  Patient received oxycodone 10 mg p  o  x1, normal saline 1000 mL x1, Solu-Medrol 125 mg IV x1, DuoNeb x3 in ED  Review of Systems:    Review of Systems   Constitutional: Positive for activity change, appetite change, chills, fever and unexpected weight change  HENT: Positive for rhinorrhea and sneezing  Negative for sore throat  Respiratory: Positive for cough  Cardiovascular:        CHEST WALL PAIN FROM COUGHING  Gastrointestinal: Positive for nausea and vomiting  Negative for abdominal distention, constipation and diarrhea  Left inguina region pain  Musculoskeletal: Positive for arthralgias     Neurological: Negative for dizziness, light-headedness and headaches  All other systems reviewed and are negative  Past Medical and Surgical History:     Past Medical History:   Diagnosis Date    Anxiety     CAD (coronary artery disease)     Cancer ([facility] )     lung-right    Cardiac disease     Chest pain     small mass in the heart-found on a stress test 5/18/17    [facility] obstructive [facility]     CPAP (continuous positive airway pressure) dependence     Diabetes mellitus ([facility] )     Hyperlipidemia     Hypertension     Myocardial infarction 2008    Obstructive sleep apnea on CPAP     Spinal stenosis     Stroke ([place] [address] ) 2008    heat stroke, minor stroke       Past Surgical History:   Procedure Laterality Date    ANGIOPLASTY  2009    2 stents    APPENDECTOMY      COLON SURGERY  2010    16 inches removed-obstruction    COLONOSCOPY      CORONARY STENT PLACEMENT  2009    TN COLSC FLX W/RMVL OF TUMOR POLYP LESION SNARE TQ N/A 5/23/2017    Procedure: COLONOSCOPY and biopsy and snare polypectomy ;  Surgeon: Andrea Canseco MD;  Location: [facility] GI LAB; Service: Gastroenterology    TN EGD TRANSORAL BIOPSY SINGLE/MULTIPLE N/A 5/23/2017    Procedure: ESOPHAGOGASTRODUODENOSCOPY (EGD) and biopsy ;  Surgeon: Andrea Canseco MD;  Location: [facility] GI LAB; Service: Gastroenterology    TONSILLECTOMY         Meds/Allergies:    Prior to Admission medications    Medication Sig Start Date End Date Taking? Authorizing Provider   albuterol (PROAIR HFA) 90 mcg/act inhaler 1 puff 2 (two) times a day ProAir  (90 Base) MCG/ACT Inhalation Aerosol Solution INHALE ONE TO TWO PUFFS BY MOUTH EVERY 4 TO 6 HOURS AS NEEDED  Quantity: 18;   Refills: 5    Kristen CHAMORRO ;  Started 16-Oct-2014 Active  10/16/14  Yes Historical Provider, MD   baclofen 10 mg tablet Take 10 mg by mouth 2 (two) times a day     Yes Historical Provider, MD   clindamycin (CLEOCIN) 300 MG capsule Take 300 mg by mouth 3 (three) times a day   Yes Historical Provider, MD   clopidogrel (PLAVIX) 75 mg tablet Take 75 mg by mouth daily   Yes Historical Provider, MD   famotidine (PEPCID) 40 MG tablet Take 40 mg by mouth daily   Yes Historical Provider, MD   glipiZIDE (GLUCOTROL) 10 mg tablet GlipiZIDE 10 MG Oral Tablet TAKE TWO TABLETS BY MOUTH TWICE DAILY  Quantity: 120;  Refills: 5    Kendra CHAMORRO ;  Started 23-June-2015 Active 6/23/15  Yes Historical Provider, MD   insulin detemir (LEVEMIR FLEXPEN) 100 Units/mL subcutaneous injection pen 40 Units daily at bedtime Levemir FlexPen 100 UNIT/ML SOPN USE AS DIRECTED  INJECT 40 UNITS SUBCUTANEOUSLY AT BEDTIME  Quantity: 1;  Refills: 5    Kiko CHAMORRO ;  Started 27-Aug-2014 Active3 ML Pen (5 Pens)  8/27/14  Yes Historical Provider, MD   isosorbide mononitrate (IMDUR) 30 mg 24 hr tablet Take 1 tablet by mouth daily for 30 days  Patient taking differently: Take 30 mg by mouth every morning   5/19/17 10/30/17 Yes Nasreen Allison MD   lisinopril (ZESTRIL) 10 mg tablet Take 10 mg by mouth every morning   Yes Historical Provider, MD   lovastatin (MEVACOR) 40 MG tablet Take 40 mg by mouth daily at bedtime   Yes Historical Provider, MD   metFORMIN (GLUCOPHAGE) 500 mg tablet Take 500 mg by mouth 2 (two) times a day with meals  Yes Historical Provider, MD   metoprolol tartrate (LOPRESSOR) 25 mg tablet Take 25 mg by mouth every morning   Yes Historical Provider, MD   oxyCODONE (OXY-IR) 5 MG capsule Take 1 5 tablets every 6 hours as needed    Patient taking differently: 10 mg every 6 (six) hours as needed for severe pain   5/19/17  Yes Nasreen Allison MD   PARoxetine (PAXIL) 30 mg tablet 30 mg daily at bedtime PARoxetine HCl - 30 MG Oral Tablet TAKE ONE TABLET (30MG) BY MOUTH ONCE DAILY  Quantity: 30;  Refills: 5    Chantal CHAMORRO O ;  Started 5-Dec-2014 Active  12/5/14  Yes Historical Provider, MD   tamsulosin (FLOMAX) 0 4 mg Take 0 4 mg by mouth every morning     Yes Historical Provider, MD   tiotropium (SPIRIVA) 18 mcg inhalation capsule Place 18 mcg into inhaler and inhale daily   Yes Historical Provider, MD   Tobramycin 28 MG CAPS Take by mouth 3 (three) times a day   Yes Historical Provider, MD   albuterol (2 5 mg/3 mL) 0 083 % nebulizer solution Albuterol Sulfate (2 5 MG/3ML) 0 083% Inhalation Nebulization Solution USE 1 UNIT DOSE EVERY 4-6 HOURS AS NEEDED FOR WHEEZING   Quantity: 1;  Refills: 3    Edu CHAMORRO ;  Started 7-Feb-2014 Greentree Muss ML Angela  2/7/14   Historical Provider, MD   CANDIDO MICROLET LANCETS lancets Candido Microlet Lancets Miscellaneous USE AS DIRECTED  Quantity: 1;  Refills: 6    Edukirsten ROLDAN D ;  Started 14-Feb-2014 [id number] EA Package 2/14/14   Historical Provider, MD   Insulin Pen Needle (RELION SHORT PEN NEEDLES) 31G X 8 MM MISC ReliOn Short Pen Needles 31G X 8 MM Miscellaneous USE AS DIRECTED ONCE DAILY AT BEDTIME  Quantity: 50;  Refills: 1    Lisbet CHAMORRO ;  Started 18-Feb-2014 Active 2/18/14   Historical Provider, MD   SURE COMFORT LANCETS 30G MISC Sure Comfort Lancets 30G Miscellaneous USE AS DIRECTED  Quantity: 1;  Refills: 0    Sofia ROLDAN D ; Sarah Shukla Miscellaneous Box    Historical Provider, MD   omeprazole (PriLOSEC) 40 MG capsule Take 40 mg by mouth every morning  10/30/17  Historical Provider, MD   ondansetron (ZOFRAN) 4 mg tablet Take 4 mg by mouth every 8 (eight) hours as needed for nausea or vomiting  10/30/17  Historical Provider, MD     I have reviewed home medications with patient personally  Allergies: Allergies   Allergen Reactions    Tobramycin Rash    Penicillins Hives    Latex Rash       Social History:     Marital Status: Single   Occupation:  Disabled  Patient Pre-hospital Living Situation:  Lives alone  Patient Pre-hospital Level of Mobility:  Ambulates with cane  Patient Pre-hospital Diet Restrictions:  Diabetic diet    Substance Use History:   History   Alcohol Use No     History   Smoking Status    Current Every Day Smoker    Packs/day: 1 00    Years: 40 00    Types: Cigarettes, E-Cigarettes   Smokeless Tobacco    Never Used     Comment: quit 2 weeks ago     History   Drug Use No       Family History:    Family History   Problem Relation Age of Onset    Heart disease Mother      pacemaker    Diabetes Mother     COPD Father     Diabetes Father     Heart disease Father      pacemaker    Heart disease Brother      massive MI       Physical Exam:     Vitals:   Blood Pressure: 137/72 (10/30/17 2142)  Pulse: 103 (10/30/17 2142)  Temperature: (P) 98 5 °F (36 9 °C) (10/30/17 2139)  Temp Source: (P) Tympanic (10/30/17 2139)  Respirations: (P) 22 (10/30/17 2139)  Weight - Scale: 98 kg (216 lb) (10/30/17 1511)  SpO2: (P) 96 % (10/30/17 2139)    Physical Exam   Constitutional: He is oriented to person, place, and time  Patient appears weak and depressed  HENT:   Head: Normocephalic and atraumatic  Mouth/Throat: Oropharynx is clear and moist    Neck: Normal range of motion  Neck supple  Cardiovascular: Regular rhythm  Exam reveals no gallop and no friction rub  No murmur heard  Slight tachycardic   Pulmonary/Chest: Effort normal  No respiratory distress  He has no wheezes  He has no rales  Clear diminished breath sound bilateral    Abdominal: Soft  Bowel sounds are normal  He exhibits no distension  There is tenderness  There is no rebound  Left inguinal region/left lower quadrant tender to touch  Musculoskeletal: He exhibits no edema, tenderness or deformity  Neurological: He is alert and oriented to person, place, and time  Moves all extremities but weak, follows commands  Skin: Skin is warm and dry  Discoloration left buttock  Psychiatric:   Patient appears depressed  Nursing note and vitals reviewed  Additional Data:     Lab Results: I have personally reviewed pertinent reports          Results from last 7 days  Lab Units 10/30/17  1537   WBC Thousand/uL 6 30   HEMOGLOBIN g/dL 14 9   HEMATOCRIT % 44 5 PLATELETS Thousands/uL 229   NEUTROS PCT % 65   LYMPHS PCT % 23   MONOS PCT % 10   EOS PCT % 2       Results from last 7 days  Lab Units 10/30/17  1537   SODIUM mmol/L 138   POTASSIUM mmol/L 3 9   CHLORIDE mmol/L 100   CO2 mmol/L 28   BUN mg/dL 10   CREATININE mg/dL 0 72   CALCIUM mg/dL 9 8   TOTAL PROTEIN g/dL 8 1   BILIRUBIN TOTAL mg/dL 0 70   ALK PHOS U/L 184*   ALT U/L 21   AST U/L 22   GLUCOSE RANDOM mg/dL 134       Results from last 7 days  Lab Units 10/30/17  1537   INR  1 05       Imaging: I have personally reviewed pertinent reports  Xr Chest 1 View Portable    Result Date: 10/30/2017  Narrative: CHEST INDICATION:  Diffuse bodyaches  History of colon, liver and kidney cancer with metastatic disease  COMPARISON:  6/6/2017 VIEWS:   AP frontal IMAGES:  1 FINDINGS:     Cardiomediastinal silhouette appears stable  New right chest port in satisfactory position  Stable small bilateral lung nodules in keeping with provided history of metastatic disease  No pneumothorax or pleural effusion  Visualized osseous structures appear within normal limits for the patient's age  Impression: Stable small bilateral lung nodules in keeping with provided history of metastatic disease  Workstation performed: [id number]     Cta Ed Chest Pe Study    Result Date: 10/30/2017  Narrative: CTA - CHEST WITH IV CONTRAST - PULMONARY ANGIOGRAM INDICATION: "generalized body pains, nausea, states history of liver cancer with mets ""Respiratory: Positive for cough, chest tightness, shortness of breath and wheezing  ""Allergic/Immunologic: Positive for immunocompromised state  History of metastatic cancer, currently on chemo "" COMPARISON: CTA chest 5/18/2017  TECHNIQUE: CTA examination of the chest was performed using angiographic technique according to a protocol specifically tailored to evaluate for pulmonary embolism  Reformatted images were created in axial, sagittal, and coronal planes    In addition, coronal 3D MIP postprocessing was performed on the acquisition scanner  Radiation dose length product (DLP) for this visit:  706 61 mGy-cm   This examination, like all CT scans performed in the [facility], was performed utilizing techniques to minimize radiation dose exposure, including the use of iterative  reconstruction and automated exposure control  IV Contrast:  80 mL of iohexol (OMNIPAQUE)  350  FINDINGS: PULMONARY ARTERIAL TREE:  No pulmonary arterial embolism  LUNGS:  Patchy nodular densities throughout both lungs slightly decreased in size since the prior study in keeping with diffuse pulmonary metastases  The largest in the right upper lobe measures 13 mm and previously measured 16 mm  The largest in the left lower lobe measures 13 mm and producing measuring 15 mm  Numerous other smaller bilateral nodular densities are present  PLEURA:  Unremarkable  HEART/AORTA:  Unremarkable for patient's age  MEDIASTINUM AND JESUS:  Previously seen right parahilar measures 18 mm and previously measured approximately 27 mm  Cystic right paratracheal lymph node measures 36 mm producing measuring 38 mm  Other smaller scattered mediastinal lymph nodes are relatively unchanged to slightly smaller  CHEST WALL AND LOWER NECK:       Unremarkable  VISUALIZED STRUCTURES IN THE UPPER ABDOMEN:  Hepatic cirrhosis  Vague partially imaged left hepatic mass  With ill-defined margins on this arterial phase study  OSSEOUS STRUCTURES:  Healing fracture of the posterior right 11th rib may represent a pathologic fracture  Sclerotic area throughout the inferior aspect of the right scapula, measuring 40 mm x 21 mm, suspicious for metastatic lesion  Numerous other lesions throughout the bilateral ribs in keeping with metastatic disease  Impression: No pulmonary arterial embolism  Slightly smaller pulmonary nodules and mediastinal lymph nodes in keeping with slightly improved metastatic disease    A left hepatic mass is partially imaged and vaguely visualized  There is now osseous metastatic disease as detailed  Consider follow-up with bone scan  ##imslh##imslh    Workstation performed: [id number]       EKG, Pathology, and Other Studies Reviewed on Admission:   · EKG: ST     Allscripts Records Reviewed: Yes     ** Please Note: Dragon 360 Dictation voice to text software may have been used in the creation of this document   **

## 2017-10-31 NOTE — CONSULTS
Hematology Oncology Consult Report    Allison Rader, 1960, [id number]  [address] [place] [address]/[address] [place] [address]-*    Impression and plan:    3 52-year-old male with history of metastatic colon cancer (liver lesions, pulmonary lesions) recently on FOLFOX+ Avastin  Patient missed the most recent scheduled dose because of bronchitis  Patient has missed or delayed treatments in the past for a number of reasons  Chemotherapy is now on hold until the bronchitis/lung issues are clarified  Recent scans demonstrated new lesions within the bones  It is difficult to say if patient progressed due to the tumor becoming resistant to the chemotherapeutic agents versus progression because of suboptimal treatment  Patient has always had a limited understanding of the seriousness of the situation  Additionally, I believe the patient's next of kin (sister) lives in [place] - supports at home are limited  2  Bronchitis - treatment as per hospitalist     3  Social situation  Patient needs to be seen by social service,  etc     The above was discussed with Mr Rosalia Zelaya; patient had no questions  Please do not hesitate to contact the Hematology service if you have any questions or concerns  Thank you for this consult  Chief complaint:  Fatigue, weakness, decreased appetite    History of present illness: This is a 52-year-old male admitted with the above  Mr Rosalia Zelaya has a history of metastatic colon cancer and had been on chemotherapy  Chemotherapy has been delayed over the past few weeks because patient could not get to the infusion center  Recently, patient was so weak he could not get out of bed  Oncology nursing staff sent the patient to the emergency room  Mr Rosalia Zelaya was treated for bronchitis recently  Patient was found in his room sitting up  Mr Rosalia Zelaya was pleasant unresponsive and remembered who I was  Patient still has fatigue but denied significant pain   No nausea vomiting, appetite is decreased but better than before  Activities are limited  No fevers, chills or sweats  Patient stated having sinus congestion but this was less/better than before  No shortness of breath at rest, mild dyspnea on exertion  Cough is less/better than before  Past medical history:   Past Medical History:   Diagnosis Date    Anxiety     CAD (coronary artery disease)     Cancer ([place] [address] )     lung-right    Cardiac disease     Chest pain     small mass in the heart-found on a stress test 5/18/17    [facility]     CPAP (continuous positive airway pressure) dependence     Diabetes mellitus ([place] [address] )     Hyperlipidemia     Hypertension     Myocardial infarction 2008    Obstructive sleep apnea on CPAP     Spinal stenosis     Stroke ([place] [address] ) 2008    heat stroke, minor stroke     Past surgical history:   Past Surgical History:   Procedure Laterality Date    ANGIOPLASTY  2009    2 stents    APPENDECTOMY      COLON SURGERY  2010    16 inches removed-obstruction    COLONOSCOPY      CORONARY STENT PLACEMENT  2009    UT COLSC FLX W/RMVL OF TUMOR POLYP LESION SNARE TQ N/A 5/23/2017    Procedure: COLONOSCOPY and biopsy and snare polypectomy ;  Surgeon: Prema Steward MD;  Location: [place] [address] GI LAB; Service: Gastroenterology    UT EGD TRANSORAL BIOPSY SINGLE/MULTIPLE N/A 5/23/2017    Procedure: ESOPHAGOGASTRODUODENOSCOPY (EGD) and biopsy ;  Surgeon: Prema Steward MD;  Location: [place] [address] GI LAB; Service: Gastroenterology    TONSILLECTOMY       Allergies:    Allergies   Allergen Reactions    Tobramycin Rash    Penicillins Hives    Latex Rash     Home medications:   Prescriptions Prior to Admission   Medication    albuterol (PROAIR HFA) 90 mcg/act inhaler    baclofen 10 mg tablet    clindamycin (CLEOCIN) 300 MG capsule    clopidogrel (PLAVIX) 75 mg tablet    famotidine (PEPCID) 40 MG tablet    glipiZIDE (GLUCOTROL) 10 mg tablet    insulin detemir (LEVEMIR FLEXPEN) 100 Units/mL subcutaneous injection pen    isosorbide mononitrate (IMDUR) 30 mg 24 hr tablet    lisinopril (ZESTRIL) 10 mg tablet    lovastatin (MEVACOR) 40 MG tablet    metFORMIN (GLUCOPHAGE) 500 mg tablet    metoprolol tartrate (LOPRESSOR) 25 mg tablet    oxyCODONE (OXY-IR) 5 MG capsule    PARoxetine (PAXIL) 30 mg tablet    tamsulosin (FLOMAX) 0 4 mg    tiotropium (SPIRIVA) 18 mcg inhalation capsule    albuterol (2 5 mg/3 mL) 0 083 % nebulizer solution    CANDIDO MICROLET LANCETS lancets    Insulin Pen Needle (RELION SHORT PEN NEEDLES) 31G X 8 MM MISC    SURE COMFORT LANCETS 30G MISC    Tobramycin 28 MG [facility]     Hospital medications:   Current Facility-Administered Medications:     acetaminophen (TYLENOL) tablet 650 mg, 650 mg, Oral, Q6H PRN, Josh Irene, CRNP    aluminum-magnesium hydroxide-simethicone (MYLANTA) 200-200-20 mg/5 mL oral suspension 30 mL, 30 mL, Oral, Q6H PRN, Josh Palenciarik, CRNP    baclofen tablet 5 mg, 5 mg, Oral, BID, Cuiyikarmen Palenciarik, CRNP, 5 mg at 10/31/17 0901    clopidogrel (PLAVIX) tablet 75 mg, 75 mg, Oral, Daily, Cuiandrea Palenciarik, CRNP, 75 mg at 10/31/17 0900    dextromethorphan-guaiFENesin (ROBITUSSIN DM)  mg/5 mL oral syrup 10 mL, 10 mL, Oral, Q4H PRN, Maliandrea Palenciarik, CRNP    enoxaparin (LOVENOX) subcutaneous injection 40 mg, 40 mg, Subcutaneous, Daily, Cuiandrea Palenciarik, CRNP, 40 mg at 10/31/17 0857    famotidine (PEPCID) tablet 40 mg, 40 mg, Oral, Daily, Cuiandrea Palenciarik, CRNP, 40 mg at 10/31/17 0900    glipiZIDE (GLUCOTROL) tablet 10 mg, 10 mg, Oral, BID AC, Cuiandrea Palenciarik, CRNP, 10 mg at 10/31/17 0859    insulin detemir (LEVEMIR) subcutaneous injection 40 Units, 40 Units, Subcutaneous, HS, EVA Hernandez, 40 Units at 10/30/17 2232    insulin lispro (HumaLOG) 100 units/mL subcutaneous injection 1-5 Units, 1-5 Units, Subcutaneous, HS, EVA Hernandez, 3 Units at 10/30/17 2232    insulin lispro (HumaLOG) 100 units/mL subcutaneous injection 2-12 Units, 2-12 Units, Subcutaneous, TID AC, 2 Units at 10/31/17 0858 **AND** Fingerstick Glucose (POCT), , , TID AC, Jsoh Irene, MICHAELNP    isosorbide mononitrate (IMDUR) 24 hr tablet 30 mg, 30 mg, Oral, QAM, Cuiyin Yurik, CRNP, 30 mg at 10/31/17 0900    levalbuterol (XOPENEX) inhalation solution 0 63 mg, 0 63 mg, Nebulization, Q4H PRN, Josh Palenciarik, CRNP    lisinopril (ZESTRIL) tablet 5 mg, 5 mg, Oral, QAM, Cuiyin Yurik, CRNP, 5 mg at 10/31/17 0901    metoprolol tartrate (LOPRESSOR) tablet 25 mg, 25 mg, Oral, Q12H [facility], Cuiandrea Palenciarik, CRNP, 25 mg at 10/31/17 0859    ondansetron (ZOFRAN) injection 4 mg, 4 mg, Intravenous, Q6H PRN, Josh Palenciarik, CRNP    oxyCODONE (ROXICODONE) immediate release tablet 10 mg, 10 mg, Oral, Q6H PRN, Josh Palenciarik, CRNP, 10 mg at 10/31/17 0900    oxyCODONE (ROXICODONE) IR tablet 5 mg, 5 mg, Oral, Q6H PRN, Josh Palenciarik, CRNP    PARoxetine (PAXIL) tablet 30 mg, 30 mg, Oral, HS, Cuiyin Yurik, CRNP, 30 mg at 10/30/17 2143    pravastatin (PRAVACHOL) tablet 40 mg, 40 mg, Oral, HS, Cuiyin Yurik, CRNP, 40 mg at 10/30/17 2143    tamsulosin (FLOMAX) capsule 0 4 mg, 0 4 mg, Oral, Daily With Dinner, Josh Palenciarik, CRNP, 0 4 mg at 10/30/17 2143    tiotropium (SPIRIVA) capsule for inhaler 18 mcg, 18 mcg, Inhalation, Daily, Josh Irene, CRNP, 18 mcg at 10/31/17 0902    Social history: single, disabled,lives alone, one pack per day for 40 years, no alcohol or drug abuse, no toxic exposure    Family history:   Family History   Problem Relation Age of Onset    Heart disease Mother      pacemaker    Diabetes Mother     COPD Father     Diabetes Father     Heart disease Father      pacemaker    Heart disease Brother      massive MI     Review of systems: deferred    Physical exam  Vitals:    10/31/17 0901   BP:    Pulse:    Resp:    Temp:    SpO2: 96%   Constitutional:  Middle-aged male, no respiratory distress  Eyes:  PERRL, conjunctiva normal, anicteric   HENT:  Atraumatic, external ears normal, nose normal,   Oropharynx: moist, no pharyngeal exudates, no thrush, pink  Neck: No adenopathy, good range of motion  Respiratory: distant breath sounds bilaterally, scattered rhonchi, no rales  Cardiovascular:  Normal rate, normal rhythm, no murmurs, no gallops, no rubs   GI:  Soft, obese, nontender, cannot palpate liver or spleen, no rigidity or rebound  :  No costovertebral angle tenderness, normal reproductive organs, no discharge  Musculoskeletal: + generalized pain with palpation of joints, muscles and bones and extremities  Integument: dry, scattered ecchymoses, no petechia, no active bleeding, warm  Lymphatic:  No lymphadenopathy in the neck, supraclavicular region, infraclavicular region, axilla and groin bilaterally  Extremities:  1+ bilateral lower extremity edema, no cords, pulses are decreased  Neurologic:  Alert & oriented x 3, CN 2-12 normal, normal motor function, normal sensory function, no focal deficits noted  Psychiatric:  Speech and behavior appropriate, response time appropriate  Rectal: Deferred    Laboratory test results    Results for Bassam Pacheco (MRN [id number]) as of 11/2/2017 17:22   Ref   Range 10/31/2017 05:56   WBC Latest Ref Range: 4 80 - 10 80 Thousand/uL 4 30 (L)   RBC Latest Ref Range: 4 70 - 6 10 Million/uL 4 15 (L)   Hemoglobin Latest Ref Range: 14 0 - 18 0 g/dL 13 0 (L)   Hematocrit Latest Ref Range: 42 0 - 52 0 % 39 2 (L)   MCV Latest Ref Range: 82 - 98 fL 94   MCH Latest Ref Range: 27 0 - 31 0 pg 31 4 (H)   MCHC Latest Ref Range: 31 4 - 37 4 g/dL 33 3   RDW Latest Ref Range: 11 6 - 15 1 % 15 7 (H)   Platelets Latest Ref Range: 130 - 400 Thousands/uL 238   MPV Latest Ref Range: 8 9 - 12 7 fL 6 8 (L)       [employer] laboratory test results    8/24/17 WBC = 5 3 hemoglobin = 12 6 hematocrit = 37 7 platelet = 742 BUN = 10 creatinine = 1 16 alkaline phosphatase = 180 AST = 25 ALT = 9 total protein = 6 4 total bilirubin = 0 10  urine protein negative  7/27/17 WBC = 7 7 hemoglobin = 13 5 hematocrit = 40 platelet = 874 neutrophil = 50% BUN = 12 creatinine = 0 96 LFTs WNL  7/13/17 WBC = 6 2, hemoglobin = 13 1, hematocrit = 39 5, platelet count = 502, BUN = 11, creatinine = 0 95, calcium = 9 0, LFTs = WNL, alkaline phosphatase 251, urinalysis demonstrates trace ketones without proteinuria  6/19/17 WBC = 11 0, hemoglobin = 12 8, hematocrit = 37 9 0, platelet count = 877, BUN = 15, creatinine 0 74, calcium = 9 2, LFTs = WNL, alkaline phosphatase 248  6/6/17 WBC = 8 3 hemoglobin = 12 1 hematocrit = 36 9 platelet = 189 BUN = 11 creatinine = 0 77  5/19/17 BUN = 6 creatinine = 0 65 WBC = 8 3 hemoglobin = 12 9 hematocrit = 38 9 platelet = 071 CEA = 90 2  5/1/17 BUN = 19 creatinine = 0 98 calcium = 9 9 AST = 80 = high ALT = 139 = high alkaline phosphatase = 236 = high total protein = 7 7 total bilirubin = 0 50 WBC = 12 6 hemoglobin = 15 6 hematocrit = 48 MCV = 89 RDW = 12 9 platelet = 097 neutrophil = 64% lymphocyte = 24% monocyte = 9% eosinophil = 3%      Radiology reports    10/30/17 CTA chest PE study    No pulmonary arterial embolism      Slightly smaller pulmonary nodules and mediastinal lymph nodes in keeping with slightly improved metastatic disease  A left hepatic mass is partially imaged and vaguely visualized  There is now osseous metastatic disease as detailed  Consider follow-up with bone scan  5/1/17 CTA/chest did not demonstrate any evidence of a pulmonary embolism  Patient had bilateral pulmonary nodules and worsening mediastinal and hilar adenopathy suspicious for metastatic disease  The largest lymph nodes measured 3 6 x 2 8 cm in the right paratracheal region  Patient had 2 0 x 2 1 cm lymph nodes in the right pericardiac region  5/1/17 CAT scan of the abdomen and pelvis demonstrated multiple low-density lesions throughout both lobes of the fatty liver including a 4 2 x 4 4 cm mass in the right hepatic dome and a 5 5 x 4 9 mass in the lateral segment of the left lobe   There was an ill-defined lobular mass at the base of the cecum concerning for primary malignancy with metastatic iliocolic and retroperitoneal adenopathy  There was an asymmetric enlargement and an ill definition of the left iliacus muscle concerning for retroperitoneal hematoma  17 CAT scan of the lumbar spine demonstrated mild inflammatory changes surrounding the prominent iliacus muscle, retroperitoneal hematoma suspected  Patient also had retroperitoneal adenopathy  Pathology     17; date of report 17 [employer] NGS KRAS Sequencin mutation for K-seb p [id number]     17 liver, 6 cores demonstrated metastatic adenocarcinoma in the background of dense fibrosis and necrosis compatible with a colonic origin  17 large intestine, cecum demonstrated moderately differentiated adenocarcinoma  Mismatch repair protein panel demonstrated all genes to be expressed  KRAS mutation analysis was pending still

## 2017-10-31 NOTE — PHYSICAL THERAPY NOTE
PT EVALUATION     10/31/17 1130   Pain Assessment   Pain Assessment 0-10   Pain Score 9  (L LE and LS area)   Home Living   Type of [address] level;Elevator   Home Equipment Cane;Walker; Wheelchair-electric   Additional Comments patient using cane mostly prior to admission but also with roller and rollator walkers   Prior Function   Level of [place] Independent with ADLs and functional mobility   Lives With Alone   Receives Help From Family   ADL Assistance Independent   IADLs Needs assistance   Comments brother assist with transportation   Restrictions/Precautions   Other Precautions Droplet precautions; Fall Risk   General   Additional Pertinent History chart reviewed  patient admitted with flu/SOB  Patient with history of LS dyfunction with LLE radicular pain and following with pain management for several years  Family/Caregiver Present No   Cognition   Overall Cognitive Status WFL   Arousal/Participation Cooperative   Attention Within functional limits   Orientation Level Oriented X4   Following Commands Follows all commands and directions without difficulty   RLE Assessment   RLE Assessment (ROM WFL, strength 4-/5)   LLE Assessment   LLE Assessment (ROM WFL, strength 3+/5 due to LS dysfunction as per pt)   Coordination   Movements are Fluid and Coordinated 0   Bed Mobility   Supine to Sit 7  Independent   Transfers   Sit to Stand 5  Supervision   Stand to Sit 5  Supervision   Ambulation/Elevation   Gait Assistance 5  Supervision   Additional items Verbal cues   Assistive Device Rolling walker   Distance 30 feet with change in direction and static standing for 3-5 minutes with out balance loss   Balance   Static Sitting Good   Dynamic Sitting Fair +   Static Standing Fair +   Dynamic Standing Fair +   Ambulatory Fair +   Activity Tolerance   Activity Tolerance Patient limited by pain; Patient limited by fatigue   Nurse Made Aware yes   Assessment   Prognosis Good   Problem List Decreased strength;Decreased range of motion;Decreased endurance; Impaired balance;Decreased mobility; Decreased coordination;Pain   Assessment Patient seen for Physical Therapy evaluation  Patient admitted with Generalized weakness  Comorbidities affecting patient's physical performance include: colon CA to liver and lung, bronchitis, LS dysfunction with pain and gait dysfunction  Personal factors affecting patient at time of initial evaluation include: ambulating with assistive device, inability to ambulate household distances, inability to navigate community distances, inability to navigate level surfaces without external assistance, inability to perform dynamic tasks in community, limited home support, positive fall history, inability to perform physical activity, inability to perform ADLS and inability to perform IADLS   Prior to admission, patient was independent with functional mobility with cane/walker, independent with ADLS, requiring assist for IADLS and ambulating household distance  Please find objective findings from Physical Therapy assessment regarding body systems outlined above with impairments and limitations including weakness, decreased ROM, impaired balance, decreased endurance, impaired coordination, gait deviations, pain, decreased activity tolerance, decreased functional mobility tolerance and fall risk  The Barthel Index was used as a functional outcome tool presenting with a score of 55 today indicating marked limitations of functional mobility and ADLS  Patient's clinical presentation is currently unstable/unpredictable as seen in patient's presentation of changing level of pain, increased fall risk, new onset of impairment of functional mobility, decreased endurance and new onset of weakness  Pt would benefit from continued Physical Therapy treatment to address deficits as defined above and maximize level of functional mobility   As demonstrated by objective findings, the assigned level of complexity for this evaluation is high  Goals   Patient Goals go home and get to pain management appointment   STG Expiration Date (1 to 7 days)   Short Term Goal #1 transfers and gait with roller walker independently   Short Term Goal #2 gait endurance to 80 feet, strength BLEs 3+/4-   LTG Expiration Date (8 to 14 days)   Long Term Goal #1 transfers and gait with cane independently   Long Term Goal #2 gait endurance to functional household distances, strength BLEs 4_/5   Plan   Treatment/Interventions ADL retraining;Functional transfer training;LE strengthening/ROM; Therapeutic exercise; Endurance training;Patient/family training;Equipment eval/education;Gait training   PT Frequency (3-5x/week)   Recommendation   Recommendation (home with follow up LS care and use of roller walker)   Barthel Index   Feeding 10   Bathing 0   Grooming Score 0   Dressing Score 5   Bladder Score 10   Bowels Score 10   Toilet Use Score 5   Transfers (Bed/Chair) Score 10   Mobility (Level Surface) Score 0   Stairs Score 5   Barthel Index Score 55

## 2017-10-31 NOTE — PLAN OF CARE
Problem: DISCHARGE PLANNING - CARE MANAGEMENT  Goal: Discharge to post-acute care or home with appropriate resources  INTERVENTIONS:  - Conduct assessment to determine patient/family and health care team treatment goals, and need for post-acute services based on payer coverage, community resources, and patient preferences, and barriers to discharge  - Address psychosocial, clinical, and financial barriers to discharge as identified in assessment in conjunction with the patient/family and health care team  - Arrange appropriate level of post-acute services according to patient's   needs and preference and payer coverage in collaboration with the physician and health care team  - Communicate with and update the patient/family, physician, and health care team regarding progress on the discharge plan  - Arrange appropriate transportation to post-acute venues  -Discharge to home when medically stable  Outcome: Progressing

## 2017-10-31 NOTE — CASE MANAGEMENT
Initial Clinical Review    Admission: Date/Time/Statement: 10/30/17 1724    Orders Placed This Encounter   Procedures    Place in Observation (expected length of stay for this patient is less than two midnights)     Standing Status:   Standing     Number of Occurrences:   1     Order Specific Question:   Admitting Physician     Answer:   Mac Love     Order Specific Question:   Level of Care     Answer:   Med Surg [16]         ED: Date/Time/Mode of Arrival:   ED Arrival Information     Expected Arrival Acuity Means of Arrival Escorted By Service Admission Type    - 10/30/2017 14:58 Emergent Wheelchair Friend General Medicine Emergency    Arrival Complaint    BODY PAINS          Chief Complaint:   Chief Complaint   Patient presents with    Generalized Body Aches     generalized body pains, nausea, states history of liver cancer with mets       History of Illness: 65 y o  male with PMH of colon cancer , CAD, COPD, type 2 diabetes, hypertension, hyperlipidemia, sleep apnea, depression/anxiety who presents with generalized weakness and pain  He states he has been feeling weak since last chemo, he has chest pain, ribs pain from coughing and has chronic pain in left inguinal region,left buttock down to left leg/sciatic pain and spinal stenosis  He ran out of his oxycodone last night  He sees a pain specialist for spinal stenosis and sciatic pain  The pain specialist prescribes oxycodone for him  He states he had flu infection 2 weeks ago, was seen in [place]  He was prescribed cough medication prn and prednisone  He did not take prednisone, he took clindamycin instead  He reports productive cough with greenish phlegm, had blood tinged sputum a week ago  He denies worsening SOB from baseline  He denies sore throat, reports runny nose  He denies headache, dizziness, diarrhea constipation  He reports fever chills at home 1 week ago  He reports nauseous and vomiting in the morning at times since his last chemo  Reports chronic numbness/tingling in left lower extremity from possible sciatic nerve damage from injection by his pain specialist   He reports generalized pain 8/10 currently  He reports poor appetite for past couple of days  He reports 80 lb weight loss in 1 year  He reports he receives chemo every 2 weeks, has a couple of more session left  Chemo is currently on hold due to recent "flu" infection  ED Vital Signs:   ED Triage Vitals [10/30/17 1511]   Temperature Pulse Respirations Blood Pressure SpO2   98 5 °F (36 9 °C) (!) 114 (!) 24 127/82 93 %      Temp Source Heart Rate Source Patient Position - Orthostatic VS BP Location FiO2 (%)   Tympanic Monitor Sitting Left arm --      Pain Score       Worst Possible Pain        Wt Readings from Last 1 Encounters:   10/30/17 98 kg (216 lb)       Vital Signs (abnormal): Abnormal Labs/Diagnostic Test Results:   ABG PO2 189 3 , WBC 4 3 H/H 13/39 2 , UA ELEVATED LEUKOCYTES KETONES TRACE   CXR Stable small bilateral lung nodules in keeping with provided history of metastatic disease   CT CHEST R/O PE  No pulmonary arterial embolism  Slightly smaller pulmonary nodules and mediastinal lymph nodes in keeping with slightly improved metastatic disease  A left hepatic mass is partially imaged and vaguely visualized  There is now osseous metastatic disease as detailed    Consider follow-up with bone scan      ED Treatment:   Medication Administration from 10/30/2017 1458 to 10/30/2017 1817       Date/Time Order Dose Route Action Action by Comments     10/30/2017 1607 sodium chloride 0 9 % bolus 1,000 mL 1,000 mL Intravenous [place] [address] [facility], [address] [place] [address]      10/30/2017 1750 ipratropium-albuterol (DUO-NEB) 0 5-2 5 mg/mL inhalation solution 3 mL 3 mL Nebulization Given Amanda Davis RN      10/30/2017 1719 ipratropium-albuterol (DUO-NEB) 0 5-2 5 mg/mL inhalation solution 3 mL 3 mL Nebulization Given Amanda Davis RN      10/30/2017 1620 ipratropium-albuterol (DUO-NEB) 0 5-2 5 mg/mL inhalation solution 3 mL 3 mL Nebulization Given Saskia Browning RN      10/30/2017 1620 methylPREDNISolone sodium succinate (Solu-MEDROL) injection 125 mg 125 mg Intravenous Given Saskia Browning RN      10/30/2017 1723 oxyCODONE (ROXICODONE) oral solution 10 mg 10 mg Oral Given Saskia Browning RN      10/30/2017 1718 iohexol (OMNIPAQUE) 350 MG/ML injection (MULTI-DOSE) 80 mL 80 mL Intravenous Given Pedro Luis Steven           Past Medical/Surgical History:    Active Ambulatory Problems     Diagnosis Date Noted    Hypertension     Hyperlipidemia     Diabetes mellitus ([facility] )     Cardiac disease     CAD (coronary artery disease)     [facility]) [facility]     CARLOS (obstructive sleep apnea)     Depression     Anxiety and depression 05/18/2017    BPH (benign prostatic hyperplasia) 05/18/2017    Spinal stenosis 05/18/2017    GERD (gastroesophageal reflux disease) 05/18/2017    Tobacco use 05/18/2017    Multiple lung nodules on CT 05/18/2017    RUQ pain 05/18/2017    Obstructive sleep apnea on CPAP     Colon cancer ([facility] ) 06/06/2017    Chest wall pain 10/30/2017     Resolved Ambulatory Problems     Diagnosis Date Noted    Pneumonia 03/08/2016    Sepsis ([facility] ) 03/08/2016    Chest pain 05/18/2017     Past Medical History:   Diagnosis Date    Anxiety     CAD (coronary artery disease)     [facility]     Cardiac disease     Chest pain     COPD [facility]chronic [facility] pulmonary disease) ([facility]     CPAP (continuous positive airway pressure) dependence     Diabetes mellitus ([facility] )     Hyperlipidemia     Hypertension     Myocardial infarction 2008    Obstructive sleep apnea on CPAP     Spinal stenosis     Stroke [facility]) 2008       Admitting Diagnosis: SOB (shortness of breath) [R06 02]  MCKEON (dyspnea on exertion) [R06 09]  Tachycardia [R00 0]  Body aches [R52]    Age/Sex: 65 y o  male    Assessment/Plan:   Principal Problem: Generalized weakness  Active Problems:    Acute pain    Sinus tachycardia    Weight loss  Plan for the Primary Problem(s):  · Generalized weakness  Most likely secondary to metastatic colon cancer  Known mets to liver and lung  CTA chest PE study today showed osseous metastatic disease in ribs and right scapula which is new  Patient is currently receiving chemotherapy every 2 weeks  Last chemo was on 10/9/2017  Chemo was on hold currently due to "flu" which was diagnosed on 10/16/2017 per patient  Patient was seen in [facility] on 10/16/2017,was diagnosed with acute bronchitis,and was prescribed prednisone taper, Benzonatate and Mucinex, albuterol nebulizer prn  Patient reports taking Clindamycin 300mg po TID since  Office visit,did not take Prednisone taper  Will check influenza and RSV  Will consult Oncology  PT OT eval and treat  Fall precautions  Plan for Additional Problems:   · Acute on chronic pain secondary to metastatic colon cancer,spinal stenosis, sciatic pain  Reports chest wall pain from coughing  CTA chest PE study negative for PE, but showed new osseous metastatic disease in ribs and  right scapular  Robitussin DM p r n  Amilcar Mendez Resume oxycodone p r n  from home  Will decrease baclofen to 5 mg p  o  b i d  due to 1  Check troponin x 2  Place Patient on telemetry  Verified with [facility] website  · Sinus tachycardia  Heart rate around 110's  EKG in June 2017 showed ST as well  Will check magnesium  Will increase home medication metoprolol tartrate to 25 mg p o  q 12 hours  Place patient on telemetry  · History of colon cancer with mets to liver and lung, diagnosed in 5/2017, now with osseous metastatic disease  Currently receiving palliative chemo every 2 weeks  Last chemo on 10/9/2017  Will consult Oncology  · Hypertension  Systolic -359'W  Will decrease lisinopril to 5 mg p o  Daily due to increasing metoprolol  Monitor  · T2DM  Glucose WNL  Check A1c  Will continue glipizide, Levemir  Will hold metformin  Accu-Chek a c  and HS with SSI  · CAD with stents x2  Continue Plavix, metoprolol, imdur, lovastatin  · Hyperlipidemia  Continue lovastatin  · History of stroke  Most likely TIA  Continue Plavix, lovastatin  · GERD  Continue Pepcid  · COPD  Continue Spiriva  Will order Xopenex p r n     · CARLOS  Continue CPAP at HS at 16 cm H2O   · BPH  Patient asymptomatic  Continue Flomax  · Depression /anxiety  Continue Paxil  · Weight loss  80 lbs weight loss within 1 year per patient  Will consult dietitian  VTE Prophylaxis: Enoxaparin (Lovenox)  / sequential compression device   Code Status: Full code  POLST: POLST form is not discussed and not completed at this time  Anticipated Length of Stay:  Patient will be admitted on an Observation basis with an anticipated length of stay of  < 2 midnights  Justification for Hospital Stay:  Generalized weakness       Admission Orders:  TELE MON  CONSULT ONCOLOGY  PTOT  CPAP HS  RESPIRATORY PROTOCOL   Scheduled Meds:   baclofen 5 mg Oral BID   clopidogrel 75 mg Oral Daily   enoxaparin 40 mg Subcutaneous Daily   famotidine 40 mg Oral Daily   glipiZIDE 10 mg Oral BID AC   insulin detemir 40 Units Subcutaneous HS   insulin lispro 1-5 Units Subcutaneous HS   insulin lispro 2-12 Units Subcutaneous TID AC   isosorbide mononitrate 30 mg Oral QAM   lisinopril 5 mg Oral QAM   metoprolol tartrate 25 mg Oral Q12H YURIDIA   PARoxetine 30 mg Oral HS   pravastatin 40 mg Oral HS   tamsulosin 0 4 mg Oral Daily With Dinner   tiotropium 18 mcg Inhalation Daily     Continuous Infusions:    PRN Meds:   acetaminophen    aluminum-magnesium hydroxide-simethicone    dextromethorphan-guaiFENesin    levalbuterol    ondansetron    oxyCODONE    oxyCODONE

## 2017-10-31 NOTE — PLAN OF CARE
Nutrition/Hydration-ADULT     Nutrient/Hydration intake appropriate for improving, restoring or maintaining nutritional needs Progressing        Potential for Falls     Patient will remain free of falls Progressing        Prexisting or High Potential for Compromised Skin Integrity     Skin integrity is maintained or improved Progressing        RESPIRATORY - ADULT     Achieves optimal ventilation and oxygenation Progressing

## 2017-10-31 NOTE — PROGRESS NOTES
Bed alarm challenged  Patient seen sitting on the edge of his bed  He states he just used the urinal  He was found to have removed venodyne sleeves and was turning the machine off  He states they were bothering him now  Patient laid back in bed and resumed CPAP use

## 2017-10-31 NOTE — SOCIAL WORK
SW referral received to assess needs and assist with DCP  Pt lives alone in an apartment building  [facility]  His DME includes an electric wheelchair, which he rents for $ 60 from a friend, 2 walkers, cane, nebulizer, CiPap and oxygen from [employer]  Pt is followed by Jessy Paula  He does not have a POA of Living Will  Will provide him with information on both  Pt would like assistance with securing assisted living housing   SW will continue following to assist

## 2017-10-31 NOTE — PROGRESS NOTES
Entered patient's room with Dr Mauricio Pablo for rounding  Patient became very hostile towards Dr Janet Gutierrez, while she investigated history of the patient having the Flu in the community  As the conversation progressed, patient became very agitated  This nurse attempted multiple of times to address, redirect and educate patient regarding the flu  Patient refused to hold a conversation with Dr Janet Gutierrez exited the room  I proceeded to continue to prepare evening med pass  Patient become more agitated and began to use profanity towards the staff and threaten to leave  This nurse return in the room, offering to assist patient with what was bothering him  Still insisted on leaving asap  Offered his dinner tray, he refused  I explained that I would need to remove his IV's  He stated,  "  I can do that too"  And proceeded to remove IV's with aggresion, blood was all over the his hand and IV site  I offered to assist with the bleeding, he refused  Security was called, he refused to sign AMA    Security escorted patient out of the hospital

## 2017-11-01 ENCOUNTER — HOSPITAL ENCOUNTER (OUTPATIENT)
Dept: INFUSION CENTER | Facility: HOSPITAL | Age: 57
Discharge: HOME | End: 2017-11-01
Payer: MEDICARE

## 2017-11-01 LAB — MRSA NOSE QL CULT: NORMAL

## 2017-11-02 ENCOUNTER — GENERIC CONVERSION - ENCOUNTER (OUTPATIENT)
Dept: OTHER | Facility: OTHER | Age: 57
End: 2017-11-02

## 2017-11-02 DIAGNOSIS — C18.2 MALIGNANT NEOPLASM OF ASCENDING COLON (HCC): ICD-10-CM

## 2017-11-02 DIAGNOSIS — R16.0 HEPATOMEGALY, NOT ELSEWHERE CLASSIFIED: ICD-10-CM

## 2017-11-02 DIAGNOSIS — R91.8 OTHER NONSPECIFIC ABNORMAL FINDING OF LUNG FIELD (CODE): ICD-10-CM

## 2017-11-02 NOTE — DISCHARGE SUMMARY
Discharge Summary - [facility]    Patient Information: Missy Melissa 65 y o  male MRN: [id number]  Unit/Bed#: [address] [place] [address] Encounter: [id number]    Discharging Physician / Practitioner: [place] MD  PCP: Maritza Richardson  Admission Date: 10/30/2017  Discharge Date: 10/31/17  Reason for Admission: Generalized weakness     Discharge Diagnoses:     Principal Problem:    Generalized weakness  Active Problems:    Sinus tachycardia    Weight loss    Acute pain  Resolved Problems:    * No resolved hospital problems  *    Mild malnutrition     Consultations During Hospital Stay:  · Heme/onc - Dr Estrada Salvage     Procedures Performed:     · None     Significant Findings / Test Results:     CTA ED chest PE study [[id number]] Collected: 10/30/17 1741   Order Status: Completed Updated: 10/30/17 1754   Narrative:     CTA - CHEST WITH IV CONTRAST - PULMONARY ANGIOGRAM    INDICATION: "generalized body pains, nausea, states history of liver cancer with mets  ""Respiratory: Positive for cough, chest tightness, shortness of breath and wheezing   ""Allergic/Immunologic: Positive for immunocompromised state       History of metastatic cancer, currently on chemo ""    COMPARISON: CTA chest 5/18/2017  TECHNIQUE: CTA examination of the chest was performed using angiographic technique according to a protocol specifically tailored to evaluate for pulmonary embolism   Reformatted images were created in axial, sagittal, and coronal planes   In addition,   coronal 3D MIP postprocessing was performed on the acquisition scanner       Radiation dose length product (DLP) for this visit:  706 61 mGy-cm    This examination, like all CT scans performed in the [place], was performed utilizing techniques to minimize radiation dose exposure, including the use of iterative   reconstruction and automated exposure control      IV Contrast:  80 mL of iohexol (OMNIPAQUE)  350     FINDINGS:    PULMONARY ARTERIAL TREE:  No pulmonary arterial embolism  LUNGS:  Patchy nodular densities throughout both lungs slightly decreased in size since the prior study in keeping with diffuse pulmonary metastases   The largest in the right upper lobe measures 13 mm and previously measured 16 mm   The largest in the   left lower lobe measures 13 mm and producing measuring 15 mm   Numerous other smaller bilateral nodular densities are present  PLEURA:  Unremarkable  HEART/AORTA:  Unremarkable for patient's age  MEDIASTINUM AND JESUS:  Previously seen right parahilar measures 18 mm and previously measured approximately 27 mm   Cystic right paratracheal lymph node measures 36 mm producing measuring 38 mm   Other smaller scattered mediastinal lymph nodes are   relatively unchanged to slightly smaller  CHEST WALL AND LOWER NECK:       Unremarkable  VISUALIZED STRUCTURES IN THE UPPER ABDOMEN:  Hepatic cirrhosis   Vague partially imaged left hepatic mass   With ill-defined margins on this arterial phase study  OSSEOUS STRUCTURES:  Healing fracture of the posterior right 11th rib may represent a pathologic fracture   Sclerotic area throughout the inferior aspect of the right scapula, measuring 40 mm x 21 mm, suspicious for metastatic lesion   Numerous other   lesions throughout the bilateral ribs in keeping with metastatic disease  Impression:       No pulmonary arterial embolism  Slightly smaller pulmonary nodules and mediastinal lymph nodes in keeping with slightly improved metastatic disease   A left hepatic mass is partially imaged and vaguely visualized  There is now osseous metastatic disease as detailed   Consider follow-up with bone scan        ##imslh##imslh                 Workstation performed: [id number]   XR chest 1 view portable [[id number]] Collected: 10/30/17 1629   Order Status: Completed Updated: 10/30/17 1633   Narrative:     CHEST     INDICATION:  Diffuse bodyaches   History of colon, liver and kidney cancer with metastatic disease  COMPARISON:  6/6/2017    VIEWS:   AP frontal    IMAGES:  1    FINDINGS:         Cardiomediastinal silhouette appears stable   New right chest port in satisfactory position  Stable small bilateral lung nodules in keeping with provided history of metastatic disease  No pneumothorax or pleural effusion  Visualized osseous structures appear within normal limits for the patient's age  Impression:       Stable small bilateral lung nodules in keeping with provided history of metastatic disease  ·     Incidental Findings:   · See above      Test Results Pending at Discharge (will require follow up): · None     Outpatient Tests Requested:  · None    Complications:  See below     Hospital Course:     Ming Ding is a 65 y o  male w/extensive PMH including metastatic CRC on chemotherapy, CAD, COPD, HTN, T2DM, HLP, CARLOS, and depression/anxiety who originally presented to the hospital on 10/30/17 with generalized weakness and pain after his last chemotherapy session along with pleuritic chest pain from coughing  He reports that he ran out of his oxycodone the night prior to admission  He reports that he had the influenza infection 2 wks ago which he contracted right after he received the vaccine  He states that he was taking Mucinex (gel tab), Clindamycin, and Prednisone as per Diya  Of note, he reports he has bottles of liquid Tamiflu at home  He also reports a productive cough with green colored phlegm and blood tinged sputum 1 wk ago  The patient states that he fired [employer] for interrogating him  Chemotherapy is currently on hold  He follows with Dr Dillan Hinojosa outpatient  In the ED, CXR revealed small B/L lung nodules  CTA chest was negative for PE; however, it did reveal pulmonary nodules, mediastinal LNs, left hepatic mass, and new osseous metastatic disease  Patient was a [employer] [facility] patient until he fired them  Patient remained argumentative during his hospitalization  Patient appears to have a fundamental lack of understanding regarding influenza and its related/appropriate medical management       1  Generalized weakness: likely 2/2 worsening of his metastatic CRC vs  Recent bronchitis   Last chemotherapy session on 10/9/17   Recently dx for acute bronchitis - Prednisone taper, Mucinex, Benzonatate      2  Sinus tachycardia: possibly exacerbated by pain   Metoprolol tartrate increased to 25 mg PO Q12 hrs    Better controlled today      3  HTN: c/w Metoprolol, ISMN and Lisinopril      4  Metastatic CRC: Chemotherapy currently on hold 2/2 bronchitis   F/u heme/onc outpatient      Patient began cursing profusely in the room  He remained argumentative and decided that he wanted to leave against medical advice  He ripped out his peripheral IV on his own  Security was called  They subsequently escorted him out of the hospital      Condition at Discharge: fair     Discharge Day Visit / Exam:     * Please refer to separate progress note for these details *  Unable to perform physical exam due to patient's refusal        Disposition:     Home - Patient left against medical advice  For Discharges to [facility]:   · Not Applicable to this Patient - Not Applicable to this Patient    Planned Readmission: No    Discharge Statement:   Patient left against medical advice on 10/31/17  ** Please Note: This note has been constructed using a voice recognition system   **

## 2017-11-04 LAB — BACTERIA BLD CULT: NORMAL

## 2017-11-05 LAB — BACTERIA BLD CULT: NORMAL

## 2017-11-09 ENCOUNTER — GENERIC CONVERSION - ENCOUNTER (OUTPATIENT)
Dept: OTHER | Facility: OTHER | Age: 57
End: 2017-11-09

## 2017-11-09 ENCOUNTER — ALLSCRIPTS OFFICE VISIT (OUTPATIENT)
Dept: OTHER | Facility: OTHER | Age: 57
End: 2017-11-09

## 2017-11-10 ENCOUNTER — GENERIC CONVERSION - ENCOUNTER (OUTPATIENT)
Dept: OTHER | Facility: OTHER | Age: 57
End: 2017-11-10

## 2017-11-30 ENCOUNTER — APPOINTMENT (OUTPATIENT)
Dept: LAB | Facility: HOSPITAL | Age: 57
End: 2017-11-30
Payer: MEDICARE

## 2017-11-30 ENCOUNTER — TRANSCRIBE ORDERS (OUTPATIENT)
Dept: ADMINISTRATIVE | Facility: HOSPITAL | Age: 57
End: 2017-11-30

## 2017-11-30 DIAGNOSIS — R91.8 LUNG MASS: ICD-10-CM

## 2017-11-30 DIAGNOSIS — C18.2 MALIGNANT NEOPLASM OF ASCENDING COLON (HCC): ICD-10-CM

## 2017-11-30 DIAGNOSIS — R16.0 HEPATOMEGALY: Primary | ICD-10-CM

## 2017-11-30 LAB
ALBUMIN SERPL BCP-MCNC: 2.7 G/DL (ref 3.5–5)
ALP SERPL-CCNC: 394 U/L (ref 46–116)
ALT SERPL W P-5'-P-CCNC: 145 U/L (ref 12–78)
ANION GAP SERPL CALCULATED.3IONS-SCNC: 13 MMOL/L (ref 4–13)
AST SERPL W P-5'-P-CCNC: 83 U/L (ref 5–45)
BASOPHILS # BLD AUTO: 0 THOUSANDS/ΜL (ref 0–0.1)
BASOPHILS NFR BLD AUTO: 0 % (ref 0–1)
BILIRUB SERPL-MCNC: 0.4 MG/DL (ref 0.2–1)
BILIRUB UR QL STRIP: NEGATIVE
BUN SERPL-MCNC: 15 MG/DL (ref 5–25)
CALCIUM SERPL-MCNC: 9.2 MG/DL (ref 8.3–10.1)
CHLORIDE SERPL-SCNC: 90 MMOL/L (ref 100–108)
CLARITY UR: CLEAR
CO2 SERPL-SCNC: 28 MMOL/L (ref 21–32)
COLOR UR: YELLOW
CREAT SERPL-MCNC: 0.81 MG/DL (ref 0.6–1.3)
EOSINOPHIL # BLD AUTO: 0.1 THOUSAND/ΜL (ref 0–0.61)
EOSINOPHIL NFR BLD AUTO: 1 % (ref 0–6)
ERYTHROCYTE [DISTWIDTH] IN BLOOD BY AUTOMATED COUNT: 14.2 % (ref 11.6–15.1)
GFR SERPL CREATININE-BSD FRML MDRD: 99 ML/MIN/1.73SQ M
GLUCOSE SERPL-MCNC: 88 MG/DL (ref 65–140)
GLUCOSE UR STRIP-MCNC: NEGATIVE MG/DL
HCT VFR BLD AUTO: 38.8 % (ref 42–52)
HGB BLD-MCNC: 13 G/DL (ref 14–18)
HGB UR QL STRIP.AUTO: NEGATIVE
KETONES UR STRIP-MCNC: NEGATIVE MG/DL
LEUKOCYTE ESTERASE UR QL STRIP: NEGATIVE
LYMPHOCYTES # BLD AUTO: 2.5 THOUSANDS/ΜL (ref 0.6–4.47)
LYMPHOCYTES NFR BLD AUTO: 22 % (ref 14–44)
MCH RBC QN AUTO: 30.6 PG (ref 27–31)
MCHC RBC AUTO-ENTMCNC: 33.5 G/DL (ref 31.4–37.4)
MCV RBC AUTO: 91 FL (ref 82–98)
MONOCYTES # BLD AUTO: 0.8 THOUSAND/ΜL (ref 0.17–1.22)
MONOCYTES NFR BLD AUTO: 7 % (ref 4–12)
NEUTROPHILS # BLD AUTO: 8 THOUSANDS/ΜL (ref 1.85–7.62)
NEUTS SEG NFR BLD AUTO: 70 % (ref 43–75)
NITRITE UR QL STRIP: NEGATIVE
NRBC BLD AUTO-RTO: 0 /100 WBCS
PH UR STRIP.AUTO: 6 [PH] (ref 5–9)
PLATELET # BLD AUTO: 443 THOUSANDS/UL (ref 130–400)
PMV BLD AUTO: 7.1 FL (ref 8.9–12.7)
POTASSIUM SERPL-SCNC: 4.5 MMOL/L (ref 3.5–5.3)
PROT SERPL-MCNC: 7.5 G/DL (ref 6.4–8.2)
PROT UR STRIP-MCNC: NEGATIVE MG/DL
RBC # BLD AUTO: 4.25 MILLION/UL (ref 4.7–6.1)
SODIUM SERPL-SCNC: 131 MMOL/L (ref 136–145)
SP GR UR STRIP.AUTO: <=1.005 (ref 1–1.03)
UROBILINOGEN UR QL STRIP.AUTO: 1 E.U./DL
WBC # BLD AUTO: 11.5 THOUSAND/UL (ref 4.8–10.8)

## 2017-11-30 PROCEDURE — 81003 URINALYSIS AUTO W/O SCOPE: CPT | Performed by: PHYSICIAN ASSISTANT

## 2017-11-30 PROCEDURE — 36415 COLL VENOUS BLD VENIPUNCTURE: CPT | Performed by: PHYSICIAN ASSISTANT

## 2017-11-30 PROCEDURE — 85025 COMPLETE CBC W/AUTO DIFF WBC: CPT | Performed by: PHYSICIAN ASSISTANT

## 2017-11-30 PROCEDURE — 80053 COMPREHEN METABOLIC PANEL: CPT | Performed by: PHYSICIAN ASSISTANT

## 2017-12-04 ENCOUNTER — ALLSCRIPTS OFFICE VISIT (OUTPATIENT)
Dept: OTHER | Facility: OTHER | Age: 57
End: 2017-12-04

## 2017-12-04 ENCOUNTER — HOSPITAL ENCOUNTER (EMERGENCY)
Facility: HOSPITAL | Age: 57
Discharge: HOME | End: 2017-12-04
Attending: EMERGENCY MEDICINE | Admitting: EMERGENCY MEDICINE
Payer: MEDICARE

## 2017-12-04 ENCOUNTER — APPOINTMENT (EMERGENCY)
Dept: RADIOLOGY | Facility: HOSPITAL | Age: 57
End: 2017-12-04
Payer: MEDICARE

## 2017-12-04 VITALS
WEIGHT: 204 LBS | RESPIRATION RATE: 20 BRPM | HEIGHT: 66 IN | OXYGEN SATURATION: 95 % | BODY MASS INDEX: 32.78 KG/M2 | SYSTOLIC BLOOD PRESSURE: 137 MMHG | DIASTOLIC BLOOD PRESSURE: 76 MMHG | TEMPERATURE: 98 F | HEART RATE: 94 BPM
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
## 2017-12-04 DIAGNOSIS — M54.9 BACK PAIN: ICD-10-CM

## 2017-12-04 DIAGNOSIS — M54.2 NECK PAIN: ICD-10-CM

## 2017-12-04 PROCEDURE — 72125 CT NECK SPINE W/O DYE: CPT

## 2017-12-04 PROCEDURE — 99284 EMERGENCY DEPT VISIT MOD MDM: CPT

## 2017-12-04 PROCEDURE — 72100 X-RAY EXAM L-S SPINE 2/3 VWS: CPT

## 2017-12-04 PROCEDURE — 96374 THER/PROPH/DIAG INJ IV PUSH: CPT

## 2017-12-04 PROCEDURE — 70450 CT HEAD/BRAIN W/O DYE: CPT

## 2017-12-04 RX ADMIN — HYDROMORPHONE HYDROCHLORIDE 1 MG: 1 INJECTION, SOLUTION INTRAMUSCULAR; INTRAVENOUS; SUBCUTANEOUS at 17:07

## 2017-12-04 NOTE — DISCHARGE INSTRUCTIONS
Acute Low Back Pain   WHAT YOU NEED TO KNOW:   Acute low back pain is sudden discomfort in your lower back area that lasts for up to 6 weeks  The discomfort makes it difficult to tolerate activity  DISCHARGE INSTRUCTIONS:   Return to the emergency department if:   · You have severe pain  · You have sudden stiffness and heaviness on both buttocks down to both legs  · You have numbness or weakness in one leg, or pain in both legs  · You have numbness in your genital area or across your lower back  · You cannot control your urine or bowel movements  Contact your healthcare provider if:   · You have a fever  · You have pain at night or when you rest     · Your pain does not get better with treatment  · You have pain that worsens when you cough or sneeze  · You suddenly feel something pop or snap in your back  · You have questions or concerns about your condition or care  Medicines: The following medicines may be ordered by your healthcare provider:  · Acetaminophen  decreases pain  It is available without a doctor's order  Ask how much to take and how often to take it  Follow directions  Acetaminophen can cause liver damage if not taken correctly  · NSAIDs  help decrease swelling and pain  This medicine is available with or without a doctor's order  NSAIDs can cause stomach bleeding or kidney problems in certain people  If you take blood thinner medicine, always ask your healthcare provider if NSAIDs are safe for you  Always read the medicine label and follow directions  · Prescription pain medicine  may be given  Ask your healthcare provider how to take this medicine safely  · Muscle relaxers  decrease pain by relaxing the muscles in your lower spine  · Take your medicine as directed  Contact your healthcare provider if you think your medicine is not helping or if you have side effects  Tell him of her if you are allergic to any medicine   Keep a list of the medicines, vitamins, and herbs you take  Include the amounts, and when and why you take them  Bring the list or the pill bottles to follow-up visits  Carry your medicine list with you in case of an emergency  Self-care:   · Stay active  as much as you can without causing more pain  Bed rest could make your back pain worse  Start with some light exercises such as walking  Avoid heavy lifting until your pain is gone  Ask for more information about the activities or exercises that are right for you  · Ice  helps decrease swelling, pain, and muscle spams  Put crushed ice in a plastic bag  Cover it with a towel  Place it on your lower back for 20 to 30 minutes every 2 hours  Do this for about 2 to 3 days after your pain starts, or as directed  · Heat  helps decrease pain and muscle spasms  Start to use heat after treatment with ice has stopped  Use a small towel dampened with warm water or a heating pad, or sit in a warm bath  Apply heat on the area for 20 to 30 minutes every 2 hours for as many days as directed  Alternate heat and ice  Prevent acute low back pain:   · Use proper body mechanics  ¨ Bend at the hips and knees when you  objects  Do not bend from the waist  Use your leg muscles as you lift the load  Do not use your back  Keep the object close to your chest as you lift it  Try not to twist or lift anything above your waist     ¨ Change your position often when you stand for long periods of time  Rest one foot on a small box or footrest, and then switch to the other foot often  ¨ Try not to sit for long periods of time  When you do, sit in a straight-backed chair with your feet flat on the floor  Never reach, pull, or push while you are sitting  · Do exercises that strengthen your back muscles  Warm up before you exercise  Ask your healthcare provider the best exercises for you  · Maintain a healthy weight  Ask your healthcare provider how much you should weigh   Ask him to help you create a weight loss plan if you are overweight  Follow up with your healthcare provider as directed:  Return for a follow-up visit if you still have pain after 1 to 3 weeks of treatment  You may need to visit an orthopedist if your back pain lasts more than 12 weeks  Write down your questions so you remember to ask them during your visits  © 2017 [facility] Information is for End User's use only and may not be sold, redistributed or otherwise used for commercial purposes  All illustrations and images included in CareNotes® are the copyrighted property of A D [employer] M [employer] Inc  or [employer]  The above information is an  only  It is not intended as medical advice for individual conditions or treatments  Talk to your doctor, nurse or pharmacist before following any medical regimen to see if it is safe and effective for you  Acute Neck Pain   WHAT YOU NEED TO KNOW:   Acute neck pain starts suddenly, increases quickly, and goes away in a few days  The pain may come and go, or be worse with certain movements  The pain may be only in your neck, or it may move to your arms, back, or shoulders  You may also have pain that starts in another body area and moves to your neck  DISCHARGE INSTRUCTIONS:   Return to the emergency department if:   · You have an injury that causes neck pain and shooting pain down your arms or legs  · Your neck pain suddenly becomes severe  · You have neck pain along with numbness, tingling, or weakness in your arms or legs  · You have a stiff neck, a headache, and a fever  Contact your healthcare provider if:   · You have new or worsening symptoms  · Your symptoms continue even after treatment  · You have questions or concerns about your condition or care  Medicines:   · NSAIDs , such as ibuprofen, help decrease swelling, pain, and fever  This medicine is available without a doctor's order   Ask your healthcare provider which medicine to take and how often to take it  Follow directions  NSAIDs can cause stomach bleeding or kidney problems if not taken correctly  If you take blood thinner medicine, always ask if NSAIDs are safe for you  · Acetaminophen  helps decrease pain and fever  Ask your healthcare provider how much to take and how often to take it  Follow directions  Acetaminophen can cause liver damage if not taken correctly  · Steroid medicine  may be used to reduce inflammation  This can help relieve pain caused by swelling  · Take your medicine as directed  Contact your healthcare provider if you think your medicine is not helping or if you have side effects  Tell him or her if you are allergic to any medicine  Keep a list of the medicines, vitamins, and herbs you take  Include the amounts, and when and why you take them  Bring the list or the pill bottles to follow-up visits  Carry your medicine list with you in case of an emergency  Manage or prevent acute neck pain:   · Rest your neck as directed  Do not make sudden movements, such as turning your head quickly  Your healthcare provider may recommend you wear a cervical collar for a short time  The collar will prevent you from moving your head  This will give your neck time to heal if an injury is causing your neck pain  Ask your healthcare provider when you can return to sports or other normal daily activities  · Apply heat as directed  Heat helps relieve pain and swelling  Use a heat wrap, or soak a small towel in warm water  Wring out the extra water  Apply the heat wrap or towel for 20 minutes every hour, or as directed  · Apply ice as directed  Ice helps relieve pain and swelling, and can help prevent tissue damage  Use an ice pack, or put ice in a bag  Cover the ice pack or back with a towel before you apply it to your neck  Apply the ice pack or ice for 15 minutes every hour, or as directed  Your healthcare provider can tell you how often to apply ice      · Do neck exercises as directed  Neck exercises help strengthen the muscles and increase range of motion  Your healthcare provider will tell you which exercises are right for you  He may give you instructions, or he may recommend that you work with a physical therapist  Your healthcare provider or therapist can make sure you are doing the exercises correctly  · Maintain good posture  Try to keep your head and shoulders lifted when you sit  If you work in front of a computer, make sure the monitor is at the right level  You should not need to look up down to see the screen  You should also not have to lean forward to be able to read what is on the screen  Make sure your keyboard, mouse, and other computer items are placed where you do not have to extend your shoulder to reach them  Get up often if you work in front of a computer or sit for long periods of time  Stretch or walk around to keep your neck muscles loose  Follow up with your healthcare provider as directed: Your healthcare provider may refer you to a specialist if your pain does not get better with treatment  Write down your questions so you remember to ask them during your visits  © 2017 [address] [employer] Information is for End User's use only and may not be sold, redistributed or otherwise used for commercial purposes  All illustrations and images included in CareNotes® are the copyrighted property of A D A M , Inc  or [employer]  The above information is an  only  It is not intended as medical advice for individual conditions or treatments  Talk to your doctor, nurse or pharmacist before following any medical regimen to see if it is safe and effective for you

## 2017-12-04 NOTE — ED PROVIDER NOTES
History  Chief Complaint   Patient presents with    Fall     Patient fell backwards while in wheelchair and hit head     Patient is being treated for colon cancer with multiple metastasis including to the bone  He was at the oncologist's office today, and as he was leaving his walker hit a bump in the rug according to the patient and he fell backward striking the back of his head and neck at the same time  There was no witnessed loss of consciousness although the patient said he may have lost did for a moment  Patient had immediate pain in the back of the neck and in the lower back as well was not able to get up and was transported here by ambulance  He has no chest or abdominal pain  Patient is asking for pain medicine on arrival            Prior to Admission Medications   Prescriptions Last Dose Informant Patient Reported? Taking? CANDIDO MICROLET LANCETS lancets   Yes No   Sig: Candido Microlet Lancets Miscellaneous USE AS DIRECTED  Quantity: 1;  Refills: 6    Merced ROLDAN D ;  Started 2014 [id number] EA Package   Insulin Pen Needle (RELION SHORT PEN NEEDLES) 31G X 8 MM MISC   Yes No   Sig: ReliOn Short Pen Needles 31G X 8 MM Miscellaneous USE AS DIRECTED ONCE DAILY AT BEDTIME  Quantity: 50;  Refills: 1    Orin CHAMORRO ;  Started 2014 Active   PARoxetine (PAXIL) 30 mg tablet   Yes No   Si mg daily at bedtime PARoxetine HCl - 30 MG Oral Tablet TAKE ONE TABLET (30MG) BY MOUTH ONCE DAILY  Quantity: 30;  Refills: 5    Elizabeth ARREDONDO ;  Started 5-Dec-2014 Active    SURE COMFORT LANCETS 30G MISC   Yes No   Sig: Sure Comfort Lancets 30G Miscellaneous USE AS DIRECTED    Quantity: 1;  Refills: 0    Laurie Daniels Miscellaneous Box   Tobramycin 28 MG CAPS   Yes No   Sig: Take by mouth 3 (three) times a day   albuterol (2 5 mg/3 mL) 0 083 % nebulizer solution   Yes No   Sig: Albuterol Sulfate (2 5 MG/3ML) 0 083% Inhalation Nebulization Solution USE 1 UNIT DOSE EVERY 4-6 HOURS AS NEEDED FOR WHEEZING   Quantity: 1;  Refills: 3    Lucille Almaraz M D ;  Started 2014 Active3 ML Angela    albuterol [facility] HFA) 90 mcg/act inhaler   Yes No   Si puff 2 (two) times a day ProAir  (90 Base) MCG/ACT Inhalation Aerosol Solution INHALE ONE TO TWO PUFFS BY MOUTH EVERY 4 TO 6 HOURS AS NEEDED  Quantity: 18; Refills: 5    Dennie Roup M D ;  Started 16-Oct-2014 Active    baclofen 10 mg tablet   Yes No   Sig: Take 10 mg by mouth 2 (two) times a day     clindamycin (CLEOCIN) 300 MG capsule   Yes No   Sig: Take 300 mg by mouth 3 (three) times a day   clopidogrel (PLAVIX) 75 mg tablet   Yes No   Sig: Take 75 mg by mouth daily   famotidine (PEPCID) 40 MG tablet   Yes No   Sig: Take 40 mg by mouth daily   glipiZIDE (GLUCOTROL) 10 mg tablet   Yes No   Sig: GlipiZIDE 10 MG Oral Tablet TAKE TWO TABLETS BY MOUTH TWICE DAILY  Quantity: 120;  Refills: 5    Dennie Roup M D ;  Started  Active   insulin detemir (LEVEMIR FLEXPEN) 100 Units/mL subcutaneous injection pen   Yes No   Si Units daily at bedtime Levemir FlexPen 100 UNIT/ML SOPN USE AS DIRECTED  INJECT 40 UNITS SUBCUTANEOUSLY AT BEDTIME  Quantity: 1;  Refills: 5    Nia ROLDAN D ;  Started 27-Aug-2014 Active3 ML Pen (5 Pens)    isosorbide mononitrate (IMDUR) 30 mg 24 hr tablet   No No   Sig: Take 1 tablet by mouth daily for 30 days   Patient taking differently: Take 30 mg by mouth every morning     lisinopril (ZESTRIL) 10 mg tablet   Yes No   Sig: Take 10 mg by mouth every morning   lovastatin (MEVACOR) 40 MG tablet   Yes No   Sig: Take 40 mg by mouth daily at bedtime   metFORMIN (GLUCOPHAGE) 500 mg tablet   Yes No   Sig: Take 500 mg by mouth 2 (two) times a day with meals  metoprolol tartrate (LOPRESSOR) 25 mg tablet   Yes No   Sig: Take 25 mg by mouth every morning   oxyCODONE (OXY-IR) 5 MG capsule   No No   Sig: Take 1 5 tablets every 6 hours as needed     Patient taking differently: 10 mg every 6 (six) hours as needed for severe pain     tamsulosin (FLOMAX) 0 4 mg   Yes No   Sig: Take 0 4 mg by mouth every morning     tiotropium (SPIRIVA) 18 mcg inhalation capsule   Yes No   Sig: Place 18 mcg into inhaler and inhale daily      Facility-Administered Medications: None       Past Medical History:   Diagnosis Date    Anxiety     CAD (coronary artery disease)     Cancer ([place] [address] )     lung-right    Cardiac disease     Chest pain     small mass in the heart-found on a stress test 5/18/17    [facility]ch[facility] obstructive [facility]) [facility]     CPAP (continuous positive airway pressure) dependence     Diabetes mellitus ([place] [address] )     Hyperlipidemia     Hypertension     Myocardial infarction 2008    Obstructive sleep apnea on CPAP     Spinal stenosis     Stroke ([place] [address] ) 2008    heat stroke, minor stroke       Past Surgical History:   Procedure Laterality Date    ANGIOPLASTY  2009    2 stents    APPENDECTOMY      COLON SURGERY  2010    16 inches removed-obstruction    COLONOSCOPY      CORONARY STENT PLACEMENT  2009    ID COLSC FLX W/RMVL OF TUMOR POLYP LESION SNARE TQ N/A 5/23/2017    Procedure: COLONOSCOPY and biopsy and snare polypectomy ;  Surgeon: Lex Villatoro MD;  Location: [facility]; Service: Gastroenterology    ID EGD TRANSORAL BIOPSY SINGLE/MULTIPLE N/A 5/23/2017    Procedure: ESOPHAGOGASTRODUODENOSCOPY (EGD) and biopsy ;  Surgeon: Lex Villatoro MD;  Location: [facility]; Service: Gastroenterology    TONSILLECTOMY         Family History   Problem Relation Age of Onset    Heart disease Mother      pacemaker    Diabetes Mother     COPD Father     Diabetes Father     Heart disease Father      pacemaker    Heart disease Brother      massive MI     I have reviewed and agree with the history as documented      Social History   Substance Use Topics    Smoking status: Current Every Day Smoker     Packs/day: 1 00     Years: 40 00     Types: Cigarettes, E-Cigarettes    Smokeless tobacco: Never Used Comment: quit 2 weeks ago    Alcohol use No        Review of Systems   Constitutional: Negative for fever  Respiratory: Negative for shortness of breath  Cardiovascular: Negative for chest pain  Gastrointestinal: Negative for abdominal pain  Genitourinary: Negative for dysuria  Musculoskeletal: Positive for arthralgias, back pain and neck pain  Neurological: Positive for weakness  Spinal stenosis with left leg weakness   All other systems reviewed and are negative  Physical Exam  ED Triage Vitals   Temperature Pulse Respirations Blood Pressure SpO2   12/04/17 1651 12/04/17 1651 12/04/17 1651 12/04/17 1651 12/04/17 1651   98 °F (36 7 °C) 94 20 137/76 95 %      Temp Source Heart Rate Source Patient Position - Orthostatic VS BP Location FiO2 (%)   12/04/17 1651 12/04/17 1651 12/04/17 1651 12/04/17 1651 --   Tympanic Monitor Lying Left arm       Pain Score       12/04/17 1707       8           Orthostatic Vital Signs  Vitals:    12/04/17 1651   BP: 137/76   Pulse: 94   Patient Position - Orthostatic VS: Lying       Physical Exam   Constitutional: He is oriented to person, place, and time  He appears well-developed and well-nourished  HENT:   Head: Normocephalic  Mouth/Throat: Oropharynx is clear and moist    Eyes: Conjunctivae are normal    Neck:   Arrives in C-collar   Cardiovascular: Normal rate, regular rhythm and normal heart sounds  Pulmonary/Chest: Effort normal and breath sounds normal  He exhibits no tenderness  Abdominal: Soft  There is no tenderness  Musculoskeletal: Normal range of motion  Patient has his usual limitation of motion about the right shoulder and the left lower extremity-- no change since the fall   Neurological: He is alert and oriented to person, place, and time  For recall for the event   Skin: Skin is warm and dry  Psychiatric: His behavior is normal    Nursing note and vitals reviewed        ED Medications  Medications   HYDROmorphone (DILAUDID) 1 mg/mL injection 1 mg (1 mg Intravenous Given 12/4/17 3226)       Diagnostic Studies  Results Reviewed     None                 CT cervical spine without contrast   Final Result by Shanta Lord MD (12/04 1804)      No cervical spine fracture or traumatic malalignment  Possible clival and C3 metastases  Workstation performed: [id number]         CT head without contrast   Final Result by Sharri Pedersen MD (12/04 7174)      No acute intracranial abnormality  Workstation performed: [id number]         XR lumbar spine 2 or 3 views    (Results Pending)              Procedures  Procedures       Phone Contacts  ED Phone Contact    ED Course  ED Course                                MDM  Number of Diagnoses or Management Options  Back pain:   Fall, initial encounter:   Neck pain:   Diagnosis management comments: Patient has known bony metastasis is at risk for pathological fracture  Will CT head and neck an x-ray back  Medicated for pain  The patient has removed his C-collar because it was uncomfortable  Findings reviewed with the patient    CritCare Time    Disposition  Final diagnoses:   Fall, initial encounter   Neck pain   Back pain     Time reflects when diagnosis was documented in both MDM as applicable and the Disposition within this note     Time User Action Codes Description Comment    12/4/2017  6:29 PM Brando Mcdowelll Add [C39  ALRH] Fall, initial encounter     12/4/2017  6:29 PM Gattis Jodi Add [M54 2] Neck pain     12/4/2017  6:29 PM Gattis Jodi Add [M54 9] Back pain       ED Disposition     ED Disposition Condition Comment    Discharge  Diannia Nephew discharge to home/self care      Condition at discharge: Stable        Follow-up Information     Follow up With Specialties Details Why [address]   Schedule an appointment as soon as possible for a visit in 1 day  [id number] [facility]    Alycia Delgado MD Hematology and Oncology Schedule an appointment as soon as possible for a visit  [place]  [phone]          Patient's Medications   Discharge Prescriptions    No medications on file     No discharge procedures on file      ED Provider  Electronically Signed by           Claudette Sandoval MD  12/04/17 0967

## 2017-12-12 RX ORDER — SODIUM CHLORIDE 9 MG/ML
20 INJECTION, SOLUTION INTRAVENOUS ONCE
Status: DISCONTINUED | OUTPATIENT
Start: 2017-12-13 | End: 2017-12-16 | Stop reason: HOSPADM

## 2017-12-12 RX ORDER — ATROPINE SULFATE 1 MG/ML
0.25 INJECTION, SOLUTION INTRAMUSCULAR; INTRAVENOUS; SUBCUTANEOUS ONCE
Status: DISCONTINUED | OUTPATIENT
Start: 2017-12-13 | End: 2017-12-16 | Stop reason: HOSPADM

## 2017-12-12 RX ORDER — FLUOROURACIL 50 MG/ML
808 INJECTION, SOLUTION INTRAVENOUS ONCE
Status: DISCONTINUED | OUTPATIENT
Start: 2017-12-13 | End: 2017-12-16 | Stop reason: HOSPADM

## 2017-12-13 ENCOUNTER — HOSPITAL ENCOUNTER (OUTPATIENT)
Dept: INFUSION CENTER | Facility: HOSPITAL | Age: 57
Discharge: HOME | End: 2017-12-13
Payer: MEDICARE

## 2017-12-13 ENCOUNTER — GENERIC CONVERSION - ENCOUNTER (OUTPATIENT)
Dept: OTHER | Facility: OTHER | Age: 57
End: 2017-12-13

## 2017-12-20 ENCOUNTER — APPOINTMENT (EMERGENCY)
Dept: RADIOLOGY | Facility: HOSPITAL | Age: 57
DRG: 543 | End: 2017-12-20
Payer: MEDICARE

## 2017-12-20 ENCOUNTER — APPOINTMENT (INPATIENT)
Dept: RADIOLOGY | Facility: HOSPITAL | Age: 57
DRG: 543 | End: 2017-12-20
Payer: MEDICARE

## 2017-12-20 ENCOUNTER — HOSPITAL ENCOUNTER (INPATIENT)
Facility: HOSPITAL | Age: 57
LOS: 3 days | Discharge: NURSING, REHAB OR LONG-TERM CARE | DRG: 543 | End: 2017-12-23
Attending: EMERGENCY MEDICINE | Admitting: FAMILY MEDICINE
Payer: MEDICARE

## 2017-12-20 DIAGNOSIS — R73.9 HYPERGLYCEMIA: Primary | ICD-10-CM

## 2017-12-20 DIAGNOSIS — R79.89 ELEVATED LIVER FUNCTION TESTS: ICD-10-CM

## 2017-12-20 DIAGNOSIS — C79.9 METASTATIC DISEASE (HCC): ICD-10-CM
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
## 2017-12-20 DIAGNOSIS — M54.6 THORACIC BACK PAIN: ICD-10-CM

## 2017-12-20 DIAGNOSIS — M48.50XA VERTEBRAL COMPRESSION FRACTURE (HCC): ICD-10-CM

## 2017-12-20 DIAGNOSIS — M48.061 SPINAL STENOSIS AT L4-L5 LEVEL: ICD-10-CM

## 2017-12-20 PROBLEM — C18.9 METASTASIS FROM COLON CANCER (HCC): Chronic | Status: ACTIVE | Noted: 2017-12-20

## 2017-12-20 LAB
ALBUMIN SERPL BCP-MCNC: 2.4 G/DL (ref 3.5–5)
ALP SERPL-CCNC: 543 U/L (ref 46–116)
ALT SERPL W P-5'-P-CCNC: 180 U/L (ref 12–78)
ANION GAP SERPL CALCULATED.3IONS-SCNC: 13 MMOL/L (ref 4–13)
APTT PPP: 27 SECONDS (ref 24–33)
AST SERPL W P-5'-P-CCNC: 87 U/L (ref 5–45)
ATRIAL RATE: 107 BPM
BACTERIA UR QL AUTO: ABNORMAL /HPF
BASOPHILS # BLD AUTO: 0 THOUSANDS/ΜL (ref 0–0.1)
BASOPHILS NFR BLD AUTO: 0 % (ref 0–1)
BILIRUB SERPL-MCNC: 0.4 MG/DL (ref 0.2–1)
BILIRUB UR QL STRIP: ABNORMAL
BUN SERPL-MCNC: 25 MG/DL (ref 5–25)
CALCIUM SERPL-MCNC: 9.8 MG/DL (ref 8.3–10.1)
CHLORIDE SERPL-SCNC: 94 MMOL/L (ref 100–108)
CK MB SERPL-MCNC: 0.6 NG/ML (ref 0–5)
CK MB SERPL-MCNC: <1 % (ref 0–2.5)
CK SERPL-CCNC: 204 U/L (ref 39–308)
CLARITY UR: ABNORMAL
CO2 SERPL-SCNC: 25 MMOL/L (ref 21–32)
COARSE GRAN CASTS URNS QL MICRO: ABNORMAL /LPF
COLOR UR: ABNORMAL
CREAT SERPL-MCNC: 1.44 MG/DL (ref 0.6–1.3)
EOSINOPHIL # BLD AUTO: 0.1 THOUSAND/ΜL (ref 0–0.61)
EOSINOPHIL NFR BLD AUTO: 1 % (ref 0–6)
ERYTHROCYTE [DISTWIDTH] IN BLOOD BY AUTOMATED COUNT: 15 % (ref 11.6–15.1)
FINE GRAN CASTS URNS QL MICRO: ABNORMAL /LPF
GFR SERPL CREATININE-BSD FRML MDRD: 54 ML/MIN/1.73SQ M
GLUCOSE SERPL-MCNC: 171 MG/DL (ref 65–140)
GLUCOSE SERPL-MCNC: 186 MG/DL (ref 65–140)
GLUCOSE SERPL-MCNC: 265 MG/DL (ref 65–140)
GLUCOSE SERPL-MCNC: 276 MG/DL (ref 65–140)
GLUCOSE SERPL-MCNC: 62 MG/DL (ref 65–140)
GLUCOSE UR STRIP-MCNC: ABNORMAL MG/DL
HCT VFR BLD AUTO: 38.2 % (ref 42–52)
HGB BLD-MCNC: 12.7 G/DL (ref 14–18)
HGB UR QL STRIP.AUTO: NEGATIVE
HYALINE CASTS #/AREA URNS LPF: ABNORMAL /LPF
INR PPP: 1.18 (ref 0.86–1.16)
KETONES UR STRIP-MCNC: ABNORMAL MG/DL
LACTATE SERPL-SCNC: 2 MMOL/L (ref 0.5–2)
LEUKOCYTE ESTERASE UR QL STRIP: NEGATIVE
LYMPHOCYTES # BLD AUTO: 1.4 THOUSANDS/ΜL (ref 0.6–4.47)
LYMPHOCYTES NFR BLD AUTO: 12 % (ref 14–44)
MCH RBC QN AUTO: 29.7 PG (ref 27–31)
MCHC RBC AUTO-ENTMCNC: 33.2 G/DL (ref 31.4–37.4)
MCV RBC AUTO: 89 FL (ref 82–98)
MONOCYTES # BLD AUTO: 1 THOUSAND/ΜL (ref 0.17–1.22)
MONOCYTES NFR BLD AUTO: 8 % (ref 4–12)
MUCOUS THREADS UR QL AUTO: ABNORMAL
NEUTROPHILS # BLD AUTO: 9.1 THOUSANDS/ΜL (ref 1.85–7.62)
NEUTS SEG NFR BLD AUTO: 78 % (ref 43–75)
NITRITE UR QL STRIP: NEGATIVE
NON-SQ EPI CELLS URNS QL MICRO: ABNORMAL /HPF
NRBC BLD AUTO-RTO: 0 /100 WBCS
OTHER STN SPEC: ABNORMAL
P AXIS: 69 DEGREES
PH UR STRIP.AUTO: 5.5 [PH] (ref 5–9)
PLATELET # BLD AUTO: 304 THOUSANDS/UL (ref 130–400)
PLATELET # BLD AUTO: 422 THOUSANDS/UL (ref 130–400)
PLATELET BLD QL SMEAR: ABNORMAL
PMV BLD AUTO: 6.7 FL (ref 8.9–12.7)
PMV BLD AUTO: 7.1 FL (ref 8.9–12.7)
POTASSIUM SERPL-SCNC: 4.2 MMOL/L (ref 3.5–5.3)
PR INTERVAL: 118 MS
PROT SERPL-MCNC: 7 G/DL (ref 6.4–8.2)
PROT UR STRIP-MCNC: ABNORMAL MG/DL
PROTHROMBIN TIME: 12.4 SECONDS (ref 9.4–11.7)
QRS AXIS: 62 DEGREES
QRSD INTERVAL: 92 MS
QT INTERVAL: 348 MS
QTC INTERVAL: 464 MS
RBC # BLD AUTO: 4.28 MILLION/UL (ref 4.7–6.1)
RBC #/AREA URNS AUTO: ABNORMAL /HPF
SODIUM SERPL-SCNC: 132 MMOL/L (ref 136–145)
SP GR UR STRIP.AUTO: 1.01 (ref 1–1.03)
T WAVE AXIS: 70 DEGREES
TOXIC GRANULES BLD QL SMEAR: PRESENT
TROPONIN I SERPL-MCNC: <0.02 NG/ML
UROBILINOGEN UR QL STRIP.AUTO: 1 E.U./DL
VENTRICULAR RATE: 107 BPM
WBC # BLD AUTO: 11.6 THOUSAND/UL (ref 4.8–10.8)
WBC #/AREA URNS AUTO: ABNORMAL /HPF

## 2017-12-20 PROCEDURE — 99285 EMERGENCY DEPT VISIT HI MDM: CPT

## 2017-12-20 PROCEDURE — 85049 AUTOMATED PLATELET COUNT: CPT | Performed by: STUDENT IN AN ORGANIZED HEALTH CARE EDUCATION/TRAINING PROGRAM

## 2017-12-20 PROCEDURE — 93005 ELECTROCARDIOGRAM TRACING: CPT

## 2017-12-20 PROCEDURE — 96375 TX/PRO/DX INJ NEW DRUG ADDON: CPT

## 2017-12-20 PROCEDURE — 72125 CT NECK SPINE W/O DYE: CPT

## 2017-12-20 PROCEDURE — 94660 CPAP INITIATION&MGMT: CPT

## 2017-12-20 PROCEDURE — 93005 ELECTROCARDIOGRAM TRACING: CPT | Performed by: EMERGENCY MEDICINE

## 2017-12-20 PROCEDURE — 36415 COLL VENOUS BLD VENIPUNCTURE: CPT | Performed by: EMERGENCY MEDICINE

## 2017-12-20 PROCEDURE — 87081 CULTURE SCREEN ONLY: CPT | Performed by: FAMILY MEDICINE

## 2017-12-20 PROCEDURE — 72131 CT LUMBAR SPINE W/O DYE: CPT

## 2017-12-20 PROCEDURE — 94760 N-INVAS EAR/PLS OXIMETRY 1: CPT

## 2017-12-20 PROCEDURE — 96374 THER/PROPH/DIAG INJ IV PUSH: CPT

## 2017-12-20 PROCEDURE — 72141 MRI NECK SPINE W/O DYE: CPT

## 2017-12-20 PROCEDURE — 81001 URINALYSIS AUTO W/SCOPE: CPT | Performed by: EMERGENCY MEDICINE

## 2017-12-20 PROCEDURE — 72128 CT CHEST SPINE W/O DYE: CPT

## 2017-12-20 PROCEDURE — 85025 COMPLETE CBC W/AUTO DIFF WBC: CPT | Performed by: EMERGENCY MEDICINE

## 2017-12-20 PROCEDURE — 72148 MRI LUMBAR SPINE W/O DYE: CPT

## 2017-12-20 PROCEDURE — 84484 ASSAY OF TROPONIN QUANT: CPT | Performed by: EMERGENCY MEDICINE

## 2017-12-20 PROCEDURE — 85730 THROMBOPLASTIN TIME PARTIAL: CPT | Performed by: EMERGENCY MEDICINE

## 2017-12-20 PROCEDURE — 82553 CREATINE MB FRACTION: CPT | Performed by: EMERGENCY MEDICINE

## 2017-12-20 PROCEDURE — 83605 ASSAY OF LACTIC ACID: CPT | Performed by: STUDENT IN AN ORGANIZED HEALTH CARE EDUCATION/TRAINING PROGRAM

## 2017-12-20 PROCEDURE — 82948 REAGENT STRIP/BLOOD GLUCOSE: CPT

## 2017-12-20 PROCEDURE — 82550 ASSAY OF CK (CPK): CPT | Performed by: EMERGENCY MEDICINE

## 2017-12-20 PROCEDURE — 80053 COMPREHEN METABOLIC PANEL: CPT | Performed by: EMERGENCY MEDICINE

## 2017-12-20 PROCEDURE — 85610 PROTHROMBIN TIME: CPT | Performed by: EMERGENCY MEDICINE

## 2017-12-20 RX ORDER — LISINOPRIL 10 MG/1
10 TABLET ORAL EVERY MORNING
Status: DISCONTINUED | OUTPATIENT
Start: 2017-12-20 | End: 2017-12-20

## 2017-12-20 RX ORDER — BACLOFEN 10 MG/1
10 TABLET ORAL 2 TIMES DAILY
Status: DISCONTINUED | OUTPATIENT
Start: 2017-12-20 | End: 2017-12-23 | Stop reason: HOSPADM

## 2017-12-20 RX ORDER — SODIUM CHLORIDE 9 MG/ML
125 INJECTION, SOLUTION INTRAVENOUS CONTINUOUS
Status: DISCONTINUED | OUTPATIENT
Start: 2017-12-20 | End: 2017-12-23 | Stop reason: HOSPADM

## 2017-12-20 RX ORDER — PAROXETINE HYDROCHLORIDE 20 MG/1
30 TABLET, FILM COATED ORAL
Status: DISCONTINUED | OUTPATIENT
Start: 2017-12-20 | End: 2017-12-23 | Stop reason: HOSPADM

## 2017-12-20 RX ORDER — FAMOTIDINE 20 MG/1
20 TABLET, FILM COATED ORAL DAILY
Status: DISCONTINUED | OUTPATIENT
Start: 2017-12-21 | End: 2017-12-23 | Stop reason: HOSPADM

## 2017-12-20 RX ORDER — PRAVASTATIN SODIUM 40 MG
40 TABLET ORAL
Status: DISCONTINUED | OUTPATIENT
Start: 2017-12-20 | End: 2017-12-23 | Stop reason: HOSPADM

## 2017-12-20 RX ORDER — MORPHINE SULFATE 4 MG/ML
4 INJECTION, SOLUTION INTRAMUSCULAR; INTRAVENOUS ONCE
Status: COMPLETED | OUTPATIENT
Start: 2017-12-20 | End: 2017-12-20

## 2017-12-20 RX ORDER — ALBUTEROL SULFATE 90 UG/1
1 AEROSOL, METERED RESPIRATORY (INHALATION) EVERY 6 HOURS PRN
Status: DISCONTINUED | OUTPATIENT
Start: 2017-12-20 | End: 2017-12-23 | Stop reason: HOSPADM

## 2017-12-20 RX ORDER — FAMOTIDINE 20 MG/1
40 TABLET, FILM COATED ORAL DAILY
Status: DISCONTINUED | OUTPATIENT
Start: 2017-12-20 | End: 2017-12-20

## 2017-12-20 RX ORDER — NICOTINE 21 MG/24HR
1 PATCH, TRANSDERMAL 24 HOURS TRANSDERMAL DAILY
Status: DISCONTINUED | OUTPATIENT
Start: 2017-12-20 | End: 2017-12-20

## 2017-12-20 RX ORDER — HEPARIN SODIUM 5000 [USP'U]/ML
5000 INJECTION, SOLUTION INTRAVENOUS; SUBCUTANEOUS EVERY 8 HOURS SCHEDULED
Status: DISCONTINUED | OUTPATIENT
Start: 2017-12-20 | End: 2017-12-23 | Stop reason: HOSPADM

## 2017-12-20 RX ORDER — NICOTINE 21 MG/24HR
21 PATCH, TRANSDERMAL 24 HOURS TRANSDERMAL DAILY
Status: DISCONTINUED | OUTPATIENT
Start: 2017-12-20 | End: 2017-12-23 | Stop reason: HOSPADM

## 2017-12-20 RX ORDER — HYDROMORPHONE HCL 110MG/55ML
2 PATIENT CONTROLLED ANALGESIA SYRINGE INTRAVENOUS EVERY 4 HOURS PRN
Status: DISCONTINUED | OUTPATIENT
Start: 2017-12-20 | End: 2017-12-22

## 2017-12-20 RX ORDER — TAMSULOSIN HYDROCHLORIDE 0.4 MG/1
0.4 CAPSULE ORAL EVERY MORNING
Status: DISCONTINUED | OUTPATIENT
Start: 2017-12-20 | End: 2017-12-23 | Stop reason: HOSPADM

## 2017-12-20 RX ORDER — SODIUM CHLORIDE 9 MG/ML
150 INJECTION, SOLUTION INTRAVENOUS CONTINUOUS
Status: DISCONTINUED | OUTPATIENT
Start: 2017-12-20 | End: 2017-12-20

## 2017-12-20 RX ORDER — ACETAMINOPHEN 325 MG/1
650 TABLET ORAL EVERY 6 HOURS PRN
Status: DISCONTINUED | OUTPATIENT
Start: 2017-12-20 | End: 2017-12-23 | Stop reason: HOSPADM

## 2017-12-20 RX ORDER — CLOPIDOGREL BISULFATE 75 MG/1
75 TABLET ORAL DAILY
Status: DISCONTINUED | OUTPATIENT
Start: 2017-12-20 | End: 2017-12-23 | Stop reason: HOSPADM

## 2017-12-20 RX ORDER — ALBUTEROL SULFATE 90 UG/1
1 AEROSOL, METERED RESPIRATORY (INHALATION) 2 TIMES DAILY
Status: DISCONTINUED | OUTPATIENT
Start: 2017-12-20 | End: 2017-12-20

## 2017-12-20 RX ORDER — GLIPIZIDE 5 MG/1
10 TABLET ORAL
Status: DISCONTINUED | OUTPATIENT
Start: 2017-12-20 | End: 2017-12-23 | Stop reason: HOSPADM

## 2017-12-20 RX ADMIN — CLOPIDOGREL BISULFATE 75 MG: 75 TABLET ORAL at 13:40

## 2017-12-20 RX ADMIN — BACLOFEN 10 MG: 10 TABLET ORAL at 13:40

## 2017-12-20 RX ADMIN — TAMSULOSIN HYDROCHLORIDE 0.4 MG: 0.4 CAPSULE ORAL at 13:40

## 2017-12-20 RX ADMIN — HYDROMORPHONE HYDROCHLORIDE 1 MG: 1 INJECTION, SOLUTION INTRAMUSCULAR; INTRAVENOUS; SUBCUTANEOUS at 08:02

## 2017-12-20 RX ADMIN — HYDROMORPHONE HYDROCHLORIDE 1 MG: 1 INJECTION, SOLUTION INTRAMUSCULAR; INTRAVENOUS; SUBCUTANEOUS at 05:00

## 2017-12-20 RX ADMIN — MORPHINE SULFATE 4 MG: 4 INJECTION, SOLUTION INTRAMUSCULAR; INTRAVENOUS at 03:32

## 2017-12-20 RX ADMIN — SODIUM CHLORIDE 150 ML/HR: 0.9 INJECTION, SOLUTION INTRAVENOUS at 09:12

## 2017-12-20 RX ADMIN — BACLOFEN 10 MG: 10 TABLET ORAL at 20:29

## 2017-12-20 RX ADMIN — NICOTINE 21 MG: 21 PATCH, EXTENDED RELEASE TRANSDERMAL at 13:41

## 2017-12-20 RX ADMIN — INSULIN LISPRO 1 UNITS: 100 INJECTION, SOLUTION INTRAVENOUS; SUBCUTANEOUS at 16:38

## 2017-12-20 RX ADMIN — PAROXETINE HYDROCHLORIDE 30 MG: 20 TABLET, FILM COATED ORAL at 22:04

## 2017-12-20 RX ADMIN — HEPARIN SODIUM 5000 UNITS: 5000 INJECTION, SOLUTION INTRAVENOUS; SUBCUTANEOUS at 13:40

## 2017-12-20 RX ADMIN — INSULIN DETEMIR 40 UNITS: 100 INJECTION, SOLUTION SUBCUTANEOUS at 22:06

## 2017-12-20 RX ADMIN — SODIUM CHLORIDE 1000 ML: 0.9 INJECTION, SOLUTION INTRAVENOUS at 09:21

## 2017-12-20 RX ADMIN — PRAVASTATIN SODIUM 40 MG: 40 TABLET ORAL at 16:39

## 2017-12-20 RX ADMIN — HEPARIN SODIUM 5000 UNITS: 5000 INJECTION, SOLUTION INTRAVENOUS; SUBCUTANEOUS at 22:06

## 2017-12-20 RX ADMIN — HYDROMORPHONE HYDROCHLORIDE 2 MG: 2 INJECTION, SOLUTION INTRAMUSCULAR; INTRAVENOUS; SUBCUTANEOUS at 20:29

## 2017-12-20 RX ADMIN — SODIUM CHLORIDE 125 ML/HR: 0.9 INJECTION, SOLUTION INTRAVENOUS at 11:37

## 2017-12-20 RX ADMIN — HYDROMORPHONE HYDROCHLORIDE 1 MG: 1 INJECTION, SOLUTION INTRAMUSCULAR; INTRAVENOUS; SUBCUTANEOUS at 09:21

## 2017-12-20 RX ADMIN — SODIUM CHLORIDE 1000 ML: 0.9 INJECTION, SOLUTION INTRAVENOUS at 08:00

## 2017-12-20 RX ADMIN — HYDROMORPHONE HYDROCHLORIDE 2 MG: 2 INJECTION, SOLUTION INTRAMUSCULAR; INTRAVENOUS; SUBCUTANEOUS at 14:34

## 2017-12-20 RX ADMIN — GLIPIZIDE 10 MG: 5 TABLET ORAL at 15:28

## 2017-12-20 NOTE — ED NOTES
CPF paged re blood pressure and patient's request for additional pain medication       Lynda Estevez, RN  12/20/17 2226

## 2017-12-20 NOTE — PLAN OF CARE
CARDIOVASCULAR - ADULT     Maintains optimal cardiac output and hemodynamic stability Progressing     Absence of cardiac dysrhythmias or at baseline rhythm Progressing        HEMATOLOGIC - ADULT     Maintains hematologic stability Progressing        METABOLIC, FLUID AND ELECTROLYTES - ADULT     Electrolytes maintained within normal limits Progressing     Fluid balance maintained Progressing     Glucose maintained within target range Progressing        MUSCULOSKELETAL - ADULT     Maintain or return mobility to safest level of function Progressing     Maintain proper alignment of affected body part Progressing        Potential for Falls     Patient will remain free of falls Progressing        RESPIRATORY - ADULT     Achieves optimal ventilation and oxygenation Progressing        SKIN/TISSUE INTEGRITY - ADULT     Skin integrity remains intact Progressing     Incision(s), wounds(s) or drain site(s) healing without S/S of infection Progressing     Oral mucous membranes remain intact Progressing

## 2017-12-20 NOTE — PROGRESS NOTES
Patient was seen examined at bedside  Patient was evaluated by myself  Patient does appear to exhibit gradual progressive weakening of bilateral lower extremities in particular lower left extremity  Patient has had ongoing struggle for over 6 months with more of acute exacerbation in the last 2 weeks  Patient does not appear to have an acute cauda equina syndrome based on my physical exam findings  MRI will be needed  I have personally attempts to organized MRI for as soon as possible of lumbar series and if coordinated with and spoken with both MRI and resident staff members    Consult note to follow

## 2017-12-20 NOTE — CONSULTS
Consultation - Orthopedics   Regina Jimenez 65 y o  male MRN: [id number]  Unit/Bed#: 2 [address] Encounter: [id number]      Assessment/Plan     Assessment:  1) L5 compression fracture - deformity of posterior, superior endplate, bony retropulsion, central canal stenosis, spoke with neuroradiologist and MRI and no evidence of cauda equina syndrome from CT imaging, neurovascularly in tact, weakness of b/l extremities noted, traumatic fall to back 24 hours ago, weakness however has been more insidious in onset and been taking place over the last 6 months  2) Metastatic Spine lesions - present in L5 and C3, lesions likely metastatic from colorectal source based on history  Plan:  1 &2 )   - TSLO back brace  - PRN anlgesia  - PT/OT for strengthening  - f/u with ortho spine upon discharge  - will continue to follow into tomorrow to gauge any neurovascular changes  - Although cauda equina syndrome is not suspect based on clinical presentation, discussion of imaging with radiologist and MRI department MRI of lumbar and thoracic spine can be performed in a non emergency fashion to r/o cauda equina syndrome  However, after evaluation it is low suspicion     History of Present Illness   Physician Requesting Consult: Olive Farrar DO  Reason for Consult / Principal Problem: lower extremity weakness  HPI: Regina Jimenez is a 65y o  year old male with a past medical history pertinent for prior history of MI, coronary artery disease, COPD, diabetes mellitus, history of polyps, history of colorectal malignancy, history of lumbar central canal stenosis, status post fall from standing position presenting to the orthopedic surgery service for bilateral lower extremity weakness in, inability to ambulate, and lower back pain   Patient reports that he has noticed a increasing weakness in his legs bilaterally, predominantly in his left leg, since he has been receiving chemotherapy starting 6 months ago per recommendations of hematology/oncology for his ascending colon mass and since his pain management doctor while doing his injections "hit the sciatic nerve"  Patient reports that over the last 2 weeks he has notice a greater degree of decreased strength in his lower extremities  Patient reports that he used to be able to walk per for the last 2 weeks he has found it very challenging to walk with his weakness  Patient has needed to use assistive devices such as walker and furniture in order to get around the house  Patient denies any loss of sensation or complete lack of motor movement in an acute setting  Patient rather reports a more gradual an insidious weakening of his bilateral lower extremities over a 6 month period  Patient does also report that approximately 24 hours ago patient was standing up and holding onto the edge of the couch at which point with losing his balance he grabbed the edge of the couch which had a" slippery coat" on the arm of the couch and when he grabbed it he was not secured to the furniture and fell backwards  Patient reports he fell backwards on his lower back and did not hit his head  Patient denies any syncopal episode; however, he does report feeling dazed and having to think about the events status post fall  Patient denies any seizures, lack of motor movement, lack of sensation, markedly increased weakness compared to his baseline prior to fall  Patient denies any saddle anesthesia, loss of bowel function, loss of bladder function  Patient does report that since the fall he has been experiencing an exacerbation of his lower back pain without radiation  Patient's pain decreases with rest and increases with movement and straight leg raise  Inpatient consult to Orthopedic Surgery  Consult performed by: Emeterio Gaucher ordered by: Kena Yuen          Review of Systems   Constitutional: Negative for activity change, appetite change, chills and fever     Respiratory: Negative for chest tightness and shortness of breath  Cardiovascular: Negative for chest pain and palpitations  Gastrointestinal: Negative for abdominal distention, constipation, nausea and vomiting  Genitourinary: Negative for difficulty urinating, dysuria and flank pain  Musculoskeletal: Positive for back pain and myalgias  Neurological: Positive for weakness  Negative for dizziness, seizures, syncope and numbness  Historical Information   Past Medical History:   Diagnosis Date    Anxiety     CAD (coronary artery disease)     Cancer ([place] [address] )     lung-right    Cardiac disease     Chest pain     small mass in the heart-found on a stress test 5/18/17    [facility]ch[facility] obstructive [facility]) [facility]     CPAP (continuous positive airway pressure) dependence     Diabetes mellitus ([place] [address] )     Hyperlipidemia     Hypertension     Myocardial infarction 2008    Obstructive sleep apnea on CPAP     Spinal stenosis     Stroke ([place] [address] ) 2008    heat stroke, minor stroke     Past Surgical History:   Procedure Laterality Date    ANGIOPLASTY  2009    2 stents    APPENDECTOMY      COLON SURGERY  2010    16 inches removed-obstruction    COLONOSCOPY      CORONARY STENT PLACEMENT  2009    VT COLSC FLX W/RMVL OF TUMOR POLYP LESION SNARE TQ N/A 5/23/2017    Procedure: COLONOSCOPY and biopsy and snare polypectomy ;  Surgeon: Kaushik Travis MD;  Location: [place] [address] [facility]; Service: Gastroenterology    VT EGD TRANSORAL BIOPSY SINGLE/MULTIPLE N/A 5/23/2017    Procedure: ESOPHAGOGASTRODUODENOSCOPY (EGD) and biopsy ;  Surgeon: Kaushik Travis MD;  Location: [place] [address] [facility];   Service: Gastroenterology    TONSILLECTOMY       Social History   History   Alcohol Use No     History   Drug Use No     History   Smoking Status    Current Every Day Smoker    Packs/day: 1 00    Years: 40 00    Types: Cigarettes   Smokeless Tobacco    Never Used     Family History: non-contributory    Meds/Allergies   all current active meds have been reviewed and current meds:   Current Facility-Administered Medications   Medication Dose Route Frequency    acetaminophen (TYLENOL) tablet 650 mg  650 mg Oral Q6H PRN    albuterol (PROVENTIL HFA,VENTOLIN HFA) inhaler 1 puff  1 puff Inhalation Q6H PRN    baclofen tablet 10 mg  10 mg Oral BID    clopidogrel (PLAVIX) tablet 75 mg  75 mg Oral Daily    [START ON 12/21/2017] famotidine (PEPCID) tablet 20 mg  20 mg Oral Daily    glipiZIDE (GLUCOTROL) tablet 10 mg  10 mg Oral BID AC    heparin (porcine) subcutaneous injection 5,000 Units  5,000 Units Subcutaneous Q8H [address]    HYDROmorphone (DILAUDID) 2 mg/mL injection 2 mg  2 mg Intravenous Q4H PRN    insulin detemir (LEVEMIR) subcutaneous injection 40 Units  40 Units Subcutaneous HS    insulin lispro (HumaLOG) 100 units/mL subcutaneous injection 1-6 Units  1-6 Units Subcutaneous TID AC    nicotine (NICODERM CQ) 21 mg/24 hr TD 24 hr patch 21 mg  21 mg Transdermal Daily    PARoxetine (PAXIL) tablet 30 mg  30 mg Oral HS    pravastatin (PRAVACHOL) tablet 40 mg  40 mg Oral Daily With Dinner    sodium chloride 0 9 % infusion  125 mL/hr Intravenous Continuous    tamsulosin (FLOMAX) capsule 0 4 mg  0 4 mg Oral QAM    tiotropium (SPIRIVA) capsule for inhaler 18 mcg  18 mcg Inhalation Daily     Facility-Administered Medications Ordered in Other Encounters   Medication Dose Route Frequency    alteplase (CATHFLO) injection 2 mg  2 mg Intracatheter Once PRN    atropine injection 0 25 mg  0 25 mg Intravenous Once    fluorouracil (ADRUCIL) injection 808 mg  808 mg Intravenous Once    irinotecan (CAMPTOSAR) 380 mg in sodium chloride 0 9 % 500 mL chemo infusion  380 mg Intravenous Once    leucovorin 800 mg in sodium chloride 0 9 % 250 mL IVPB  800 mg Intravenous Once    ondansetron (ZOFRAN) 16 mg, dexamethasone (DECADRON) 10 mg in sodium chloride 0 9 % 50 mL IVPB   Intravenous Once    sodium chloride 0 9 % infusion  20 mL/hr Intravenous Once     Allergies Allergen Reactions    Tobramycin Rash    Penicillins Hives    Latex Rash       Objective   Vitals: Blood pressure 96/66, pulse 96, temperature (!) 96 °F (35 6 °C), temperature source Tympanic, resp  rate 18, height 5' 6" (1 676 m), weight 90 5 kg (199 lb 8 3 oz), SpO2 96 %  ,Body mass index is 32 2 kg/m²  Intake/Output Summary (Last 24 hours) at 12/20/17 1151  Last data filed at 12/20/17 1137   Gross per 24 hour   Intake             2000 ml   Output              300 ml   Net             1700 ml     I/O last 24 hours: In: 2000 [I V :1000; IV Piggyback:1000]  Out: 300 [Urine:300]    Invasive Devices     Peripheral Intravenous Line            Peripheral IV 12/20/17 Left Arm less than 1 day                Physical Exam   Constitutional: He is oriented to person, place, and time  Vital signs are normal  He appears well-developed and well-nourished  He is cooperative  He does not have a sickly appearance  He does not appear ill  No distress  He is not intubated  HENT:   Head: Normocephalic and atraumatic  Right Ear: Hearing and external ear normal    Left Ear: Hearing and external ear normal    Nose: Nose normal    Cardiovascular: Normal rate  Pulses:       Radial pulses are 2+ on the right side, and 2+ on the left side  Dorsalis pedis pulses are 2+ on the right side, and 2+ on the left side  Pulmonary/Chest: Effort normal  No accessory muscle usage  No apnea, no tachypnea and no bradypnea  He is not intubated  No respiratory distress  Musculoskeletal:        Right knee: He exhibits no effusion  Left knee: He exhibits no effusion  Neurological: He is alert and oriented to person, place, and time  No sensory deficit  Skin: Skin is warm, dry and intact  Right Ankle Exam   Swelling: none    Tenderness   The patient is experiencing no tenderness  Range of Motion   The patient has normal right ankle ROM  Muscle Strength   The patient has normal right ankle strength    Other Erythema: absent  Sensation: normal  Pulse: present       Left Ankle Exam   Swelling: none    Tenderness   The patient is experiencing no tenderness  Range of Motion   The patient has normal left ankle ROM  Muscle Strength   Dorsiflexion:  4/5   Plantar flexion:  4/5   Anterior tibial:  4/5   Posterior tibial:  4/5  Gastrocsoleus:  4/5  Peroneal muscle:  4/5    Other   Erythema: absent  Sensation: normal  Pulse: present      Right Knee Exam     Tenderness   Right knee tenderness location: no joint tenderness, tenderness in gastroc muscle group  Range of Motion   Extension: normal   Flexion: normal     Other   Erythema: absent  Sensation: normal  Swelling: none  Other tests: no effusion present      Left Knee Exam     Tenderness   Left knee tenderness location: no joint tenderness, tenderness in gastroc muscle group  Range of Motion   Extension: normal   Flexion: normal     Other   Erythema: absent  Sensation: normal  Swelling: none  Effusion: no effusion present      Right Hip Exam     Tenderness   Right hip tenderness location: no joint tenderness, tenderness in quadriceps  Range of Motion   Abduction: abnormal Right hip abduction: abnormal in all planes of motion secondary to pain with complete/full ROM  Muscle Strength   Abduction: 3/5   Adduction: 3/5   Flexion: 3/5     Other   Erythema: absent  Sensation: normal      Left Hip Exam     Tenderness   Left hip tenderness location: no joint tenderness, tenderness in quadriceps  Range of Motion   Abduction: abnormal Left hip abduction: abnormal in all planes of motion secondary to pain with complete/full ROM  Muscle Strength   Abduction: 3/5   Adduction: 3/5   Flexion: 3/5     Other   Erythema: absent  Sensation: normal      Back Exam     Tenderness   The patient is experiencing tenderness in the lumbar      Range of Motion   Extension: abnormal Back extension: limited in all range of motion secondary to pain, flexion and extension were mainly deferred  Flexion: abnormal   Lateral Bend Right: abnormal   Lateral Bend Left: abnormal   Rotation Right: abnormal   Rotation Left: abnormal     Muscle Strength   Right Quadriceps:  3/5   Left Quadriceps:  2/5   Right Hamstrings:  3/5   Left Hamstrings:  2/5     Tests   Straight leg raise right: positive  Straight leg raise left: positive    Reflexes   Patellar reflexes: deferred secondary to patient positioning and discomfort with movement   Babinski's sign: normal     Other   Erythema: no back redness    Comments:  Reflexes were not able to be accurately evaluated secondary to patient positioning and discomfort            Lab Results:   I have personally reviewed pertinent lab results  CBC:   Lab Results   Component Value Date    WBC 11 60 (H) 12/20/2017    HGB 12 7 (L) 12/20/2017    HCT 38 2 (L) 12/20/2017    MCV 89 12/20/2017     12/20/2017    MCH 29 7 12/20/2017    MCHC 33 2 12/20/2017    RDW 15 0 12/20/2017    MPV 6 7 (L) 12/20/2017    NRBC 0 12/20/2017     CMP:   Lab Results   Component Value Date     (L) 12/20/2017    CL 94 (L) 12/20/2017    CO2 25 12/20/2017    ANIONGAP 13 12/20/2017    BUN 25 12/20/2017    CREATININE 1 44 (H) 12/20/2017    GLUCOSE 265 (H) 12/20/2017    CALCIUM 9 8 12/20/2017    AST 87 (H) 12/20/2017     (H) 12/20/2017    ALKPHOS 543 (H) 12/20/2017    PROT 7 0 12/20/2017    ALBUMIN 2 4 (L) 12/20/2017    BILITOT 0 40 12/20/2017    EGFR 54 12/20/2017     Imaging Studies: I have personally reviewed pertinent reports  EKG, Pathology, and Other Studies: I have personally reviewed pertinent reports  VTE Prophylaxis: Sequential compression device (Venodyne)  and Heparin    Code Status: Prior    Counseling / Coordination of Care  Total floor / unit time spent today 45 minutes  Greater than 50% of total time was spent with the patient and / or family counseling and / or coordination of care   A description of the counseling / coordination of care: reviewing patient, evaluating patient, coordinating care with MRI, ortho, residents, reviewing with attending, reviewing imaging, patient education

## 2017-12-20 NOTE — SOCIAL WORK
DASH discussion completed  Discussed goals of making sure pt's  needs are met upon discharge, pt's preferences are taken into account, pt understands health condition, medications and symptoms to watch for after returning home and pt is aware of any follow up appointments recommended by hospital physician  PT LIVES ALONE, HAS AN ELECTRIC WHEELCHAIR, WALKER, CANE, CPAP AND O2 VIA [employer], USES [facility] FOR RX NEEDS  CM AND SW WILL FOLLOW FOR D/C NEEDS

## 2017-12-20 NOTE — H&P
H&P Exam - Pinky Levine 65 y o  male MRN: [id number]  Unit/Bed#: ED 01 Encounter: [id number]      65year old male with PMH of Hypertension, Hyperlipidemia , COPD, CARLOS, CAD, BPH, Depression with Anxiety, Spinal Stenosis, and Metastatic disease of the Colon presents with lower extremity pain s/p fall  Patient will be admitted to the medical/surgical floor and will be placed under inpatient status under the service of Dr Parrish Breath  Patient is expected to stay more than 2 midnights        Assessment/Plan:    Fall:  · Likely 2/2 Mechanical vs  Cardiogenic vs  Neurogenic  · Baseline as per patient:    · Difficulty with ambulation; uses a wheelchair and cane; reports difficulty with balance  · Monitor with Telemetry  · Neurochecks q4h    Leg Pain:  · Likely 2/2 Metastatic Disease vs  Spinal Stenosis vs  Cauda Equina Syndrome  · Obtain MRI (Thoracic/Lumbar) STAT  · CT (Thoracic/Lumbar) Spine on admission:  · Metastatic Disease of the L5 vertebral body  · Acute, nondisplaced fracture posterior aspect of the 9th rib, likely to pathologic bone lesion  · Acute mildly displaced fracture through pathologic bone lesion involving the posterior medial aspect of the right 11th rib  · Significant worsening of Bony Metastatic Disease  · Worsening Pulmonary Metastatic Disease  · CT (Cervical Spine) on admission:  · Stable degenerative changes, no cervical spine fracture or traumatic malalignment  · Possible bony Metastatic Disease involving the clivus and T3 vertebral body, stable  · Worsening pulmonary metastatic disease  · CK-MB on admission: 0 6  · Total Ck: 204  · Pain Control: Dilaudid 4mg IV q6h  · Consult to Orthopedic Surgery  · Consult to Hematology/Oncology    DEBBIE:  · Likely 2/2 Multifactorial (dehydration and Rhabdomyolysis)  · Baseline Cr: 0 74-0 95  · Current Cr: 1 44  · Start IV NS 125cc    Leukocytosis:  · 2/2 Reactive vs  Infectious  · WBC on admission: 11 6  · Obtain CXR, UA, Blood Cx x2, LA  · Will monitor with daily CBC    Hyponatremia:  · Likely 2/2 Volume Depletion  · Na on admission: 132  · Start IV NS 125cc/hr  · Will monitor with daily BMP    Hypotension:  · Likely 2/2 Dehydration  · Current BP:  88/50  · 1L bolus NS given  · Will repeat BP and start IV NS 125cc/hr  · Will hold home medication due to Low Pressures: Lisinopril and Metoprolol  · Restart when Pressure has stabilized  · Monitor with daily vitals    Hx of Metastatic Colon Cancer:  · Metastasis to Lung and Liver and Bone  · Consult Hematology/Oncology    Hyperlipidemia:  · Stable; continue home medication:  Lovastatin    CAD:  · s/p PCIx2; Cardiac Cath on 1/13/116: No evidence of CAD  · Stable; continue on home medication: ASA 81 and Plavix 75mg    COPD:  · Stable; continue on home medication:  Spiriva and ProAir    CARLOS:  · Stable; continue with CPAP    BPH:  · Stable; continue on home medication: Flomax    Depression/Anxiety:  · Stable; continue on home medication: Paxil    Tobacco Dependence:  · Start Nicoderm 21mg patch  · Counseled on smoking cessation    FEN:  · Cardiac Diet + NS 125cc/hr / Replete Electrolytes as Needed / Heparin + SCDs        Plan Discussed and Agreed upon with Senior Resident and Attending Physician on-call  History of Present Illness     Cristina Mascorro is a 65year old male with a PMH of PMH of Hypertension, Hyperlipidemia , COPD, CARLOS, CAD, BPH, Depression with Anxiety, Spinal Stenosis, and Metastatic disease of the Colon who presents to the ED with a chief complaint of lower extremity pain s/p fall  As per patient, around 1:30 yesterday, he was trying to get into his wheelchair, slipped and fell on his back  Prior to the fall, patient was experiencing dizziness, lightheadedness, and sweating  He mentions losing consciousness, but is unsure  Fall was unwitnessed  Mentions no loss of urine/bowel function, tongue biting, seizure-like activity, aura, visual changes, palpitations, chest pain, and shortness of breath  Does note trouble with balance , and lower bilateral weakness and says that he feels like his "legs gave out"  He mentions being on the ground for almost 4 hours with a small table on top of him  He states that he dragged himself to a nearby phone where he was able to call his neighbor who called paramedics on his behalf  Reports previous kind of fall 2 weeks ago when he was at a follow-up appointment with his Hematologist/Oncologist - Dr Arabella Madden  Further notes bilateral lower extremity leg pain, which he says is attributed to his chronic lumbar spinal stenosis  Notes worsening pain on the left extremity than on the right  There are no exacerbating or alleviating factors  Denies feelings of warmth, tenderness, and swelling  Pain has been controlled with pain medications  Regarding his metastatic cancer of the colon, patient says that his last treatment was about 2 months ago and he has finished 10 chemotherapy treatments so far  Patient lives alone and has not eaten or drank anything in 2 days  ED Course:  Patient arrived in the ED  CK-MB and total CK were obtained  CT of cervical spine and CT of thoracic/lumbar spine were obtained  Dilaudid and morphine were used for pain control  Patient was stabilized and moved to medical floor  Review of Systems   Constitutional: Positive for activity change, appetite change and diaphoresis  Negative for chills and fever  Eyes: Negative for photophobia and visual disturbance  Respiratory: Negative for cough, choking, chest tightness, shortness of breath, wheezing and stridor  Cardiovascular: Negative for chest pain, palpitations and leg swelling  Gastrointestinal: Negative for abdominal distention, abdominal pain, blood in stool, constipation, diarrhea, nausea and vomiting  Genitourinary: Negative for decreased urine volume, difficulty urinating, flank pain, hematuria and urgency  Musculoskeletal: Positive for back pain, gait problem and myalgias  Negative for arthralgias, joint swelling, neck pain and neck stiffness  Neurological: Positive for dizziness, syncope, weakness and light-headedness  Negative for tremors, seizures, facial asymmetry, speech difficulty, numbness and headaches  Psychiatric/Behavioral: Negative for agitation, confusion and self-injury  Historical Information   Past Medical History:   Diagnosis Date    Anxiety     CAD (coronary artery disease)     Cancer ([place] [address] )     lung-right    Cardiac disease     Chest pain     small mass in the heart-found on a stress test 5/18/17    [facility]     CPAP (continuous positive airway pressure) dependence     Diabetes mellitus ([place] [address] )     Hyperlipidemia     Hypertension     Myocardial infarction 2008    Obstructive sleep apnea on CPAP     Spinal stenosis     Stroke ([place] [address] ) 2008    heat stroke, minor stroke     Past Surgical History:   Procedure Laterality Date    ANGIOPLASTY  2009    2 stents    APPENDECTOMY      COLON SURGERY  2010    16 inches removed-obstruction    COLONOSCOPY      CORONARY STENT PLACEMENT  2009    MS COLSC FLX W/RMVL OF TUMOR POLYP LESION SNARE TQ N/A 5/23/2017    Procedure: COLONOSCOPY and biopsy and snare polypectomy ;  Surgeon: Abdiaziz Tadeo MD;  Location: [place] [address] [facility]; Service: Gastroenterology    MS EGD TRANSORAL BIOPSY SINGLE/MULTIPLE N/A 5/23/2017    Procedure: ESOPHAGOGASTRODUODENOSCOPY (EGD) and biopsy ;  Surgeon: Abdiaziz Tadeo MD;  Location: [place] [address] [facility];   Service: Gastroenterology    TONSILLECTOMY       Social History   History   Alcohol Use No     History   Drug Use No     History   Smoking Status    Current Every Day Smoker    Packs/day: 1 00    Years: 40 00    Types: Cigarettes, E-Cigarettes   Smokeless Tobacco    Never Used     Comment: quit 2 weeks ago     Family History:   Family History   Problem Relation Age of Onset    Heart disease Mother      pacemaker    Diabetes Mother    Jg Hero COPD Father     Diabetes Father     Heart disease Father      pacemaker    Heart disease Brother      massive MI       Meds/Allergies   PTA meds:   Prior to Admission Medications   Prescriptions Last Dose Informant Patient Reported? Taking? CANDIDO MICROLET LANCETS lancets 2017 at Unknown time  Yes Yes   Sig: Candido Microlet Lancets Miscellaneous USE AS DIRECTED  Quantity: 1;  Refills: 6    Gerhard CHAMORRO ;  Started 2014 [id number] EA Package   Insulin Pen Needle (RELION SHORT PEN NEEDLES) 31G X 8 MM MISC 2017 at Unknown time  Yes Yes   Sig: ReliOn Short Pen Needles 31G X 8 MM Miscellaneous USE AS DIRECTED ONCE DAILY AT BEDTIME  Quantity: 50;  Refills: 1    Tyrese CHAMORRO ;  Started 2014 Active   PARoxetine (PAXIL) 30 mg tablet 2017 at Unknown time  Yes Yes   Si mg daily at bedtime PARoxetine HCl - 30 MG Oral Tablet TAKE ONE TABLET (30MG) BY MOUTH ONCE DAILY  Quantity: 30;  Refills: 5    Jess ARREDONDO ;  Started 5-Dec-2014 Active    SURE COMFORT LANCETS 30G MISC 2017 at Unknown time  Yes Yes   Sig: Sure Comfort Lancets 30G Miscellaneous USE AS DIRECTED  Quantity: 1;  Refills: 0    Janet CHAMORRO ; Miguel A Shah Miscellaneous Box   Tobramycin 28 MG CAPS 2017 at Unknown time  Yes Yes   Sig: Take by mouth 3 (three) times a day   albuterol (2 5 mg/3 mL) 0 083 % nebulizer solution 2017 at Unknown time  Yes Yes   Sig: Albuterol Sulfate (2 5 MG/3ML) 0 083% Inhalation Nebulization Solution USE 1 UNIT DOSE EVERY 4-6 HOURS AS NEEDED FOR WHEEZING   Quantity: 1;  Refills: 3    Gerhard CHAMORRO ;  Started 2014 Active3 ML Angela    albuterol [facility] HFA) 90 mcg/act inhaler 2017 at Unknown time  Yes Yes   Si puff 2 (two) times a day ProAir  (90 Base) MCG/ACT Inhalation Aerosol Solution INHALE ONE TO TWO PUFFS BY MOUTH EVERY 4 TO 6 HOURS AS NEEDED  Quantity: 18;   Refills: 5    True CHAMORRO ;  Started 16-Oct-2014 Active    baclofen 10 mg tablet 2017 at Unknown time  Yes Yes   Sig: Take 10 mg by mouth 2 (two) times a day     clindamycin (CLEOCIN) 300 MG capsule 2017 at Unknown time  Yes Yes   Sig: Take 300 mg by mouth 3 (three) times a day   clopidogrel (PLAVIX) 75 mg tablet 2017 at Unknown time  Yes Yes   Sig: Take 75 mg by mouth daily   famotidine (PEPCID) 40 MG tablet 2017 at Unknown time  Yes Yes   Sig: Take 40 mg by mouth daily   glipiZIDE (GLUCOTROL) 10 mg tablet 2017 at Unknown time  Yes Yes   Sig: GlipiZIDE 10 MG Oral Tablet TAKE TWO TABLETS BY MOUTH TWICE DAILY  Quantity: 120;  Refills: 5    Page Side M D ;  Started  Active   insulin detemir (LEVEMIR FLEXPEN) 100 Units/mL subcutaneous injection pen 2017 at Unknown time  Yes Yes   Si Units daily at bedtime Levemir FlexPen 100 UNIT/ML SOPN USE AS DIRECTED  INJECT 40 UNITS SUBCUTANEOUSLY AT BEDTIME  Quantity: 1;  Refills: 5    Lindwood Budd M D ;  Started 27-Aug-2014 Active3 ML Pen (5 Pens)    isosorbide mononitrate (IMDUR) 30 mg 24 hr tablet   No No   Sig: Take 1 tablet by mouth daily for 30 days   Patient taking differently: Take 30 mg by mouth every morning     lisinopril (ZESTRIL) 10 mg tablet 2017 at Unknown time  Yes Yes   Sig: Take 10 mg by mouth every morning   lovastatin (MEVACOR) 40 MG tablet 2017 at Unknown time  Yes Yes   Sig: Take 40 mg by mouth daily at bedtime   metFORMIN (GLUCOPHAGE) 500 mg tablet 2017 at Unknown time  Yes Yes   Sig: Take 500 mg by mouth 2 (two) times a day with meals  metoprolol tartrate (LOPRESSOR) 25 mg tablet 2017 at Unknown time  Yes Yes   Sig: Take 25 mg by mouth every morning   oxyCODONE (OXY-IR) 5 MG capsule 2017 at Unknown time  No Yes   Sig: Take 1 5 tablets every 6 hours as needed     Patient taking differently: 10 mg every 6 (six) hours as needed for severe pain     tamsulosin (FLOMAX) 0 4 mg 2017 at Unknown time  Yes Yes   Sig: Take 0 4 mg by mouth every morning     tiotropium (SPIRIVA) 18 mcg inhalation capsule 12/19/2017 at Unknown time  Yes Yes   Sig: Place 18 mcg into inhaler and inhale daily      Facility-Administered Medications: None     Allergies   Allergen Reactions    Tobramycin Rash    Penicillins Hives    Latex Rash       Objective   First Vitals:   Blood Pressure: (!) 193/52 (12/20/17 0302)  Pulse: (!) 113 (12/20/17 0259)  Temperature: 98 5 °F (36 9 °C) (12/20/17 0259)  Temp Source: Oral (12/20/17 0259)  Respirations: 22 (12/20/17 0259)  Weight - Scale: 92 5 kg (204 lb) (12/20/17 0259)  SpO2: 97 % (12/20/17 0259)    Current Vitals:   Blood Pressure: 109/64 (12/20/17 0639)  Pulse: (!) 107 (12/20/17 0633)  Temperature: 98 5 °F (36 9 °C) (12/20/17 0259)  Temp Source: Oral (12/20/17 0259)  Respirations: 20 (12/20/17 9620)  Weight - Scale: 92 5 kg (204 lb) (12/20/17 0259)  SpO2: 92 % (12/20/17 0633)    No intake or output data in the 24 hours ending 12/20/17 0704    Invasive Devices     Peripheral Intravenous Line            Peripheral IV 12/20/17 Left Arm less than 1 day                Physical Exam   Constitutional: He is oriented to person, place, and time  No distress  Mildly Cachetic; Muscular Atrophy Noted   HENT:   Head: Normocephalic and atraumatic  Eyes: Conjunctivae and EOM are normal  Pupils are equal, round, and reactive to light  Neck: Normal range of motion  Neck supple  No JVD present  No tracheal deviation present  No thyromegaly present  Cardiovascular: Normal rate, regular rhythm, normal heart sounds and intact distal pulses  Exam reveals no gallop and no friction rub  No murmur heard  Pulmonary/Chest: Effort normal and breath sounds normal  No respiratory distress  He has no wheezes  He has no rales  He exhibits no tenderness  Abdominal: Soft  Bowel sounds are normal  He exhibits no distension and no mass  There is no tenderness  There is no rebound and no guarding     Musculoskeletal:   Exam Limited 2/2 Pain  Tenderness on palpation of Spinal Cord   Lymphadenopathy:     He has no cervical adenopathy  Neurological: He is alert and oriented to person, place, and time  No cranial nerve deficit  Motor: Exam Limited 2/2 Pain  Bilateral U/E: 4/5 Motor  Left Lower Extremity: 1/5 Motor w  Intact Sensation  Right Lower Extremity: 3/5 Motor w  Decreased Sensation  **Inconsistent Sharp-Dull on testing for sensation**  Limited ROM       Skin: He is not diaphoretic  Psychiatric: He has a normal mood and affect         Lab Results:  Results for orders placed or performed during the hospital encounter of 12/20/17   CK Total with Reflex CKMB   Result Value Ref Range    Total  39 - 308 U/L   CBC and differential   Result Value Ref Range    WBC 11 60 (H) 4 80 - 10 80 Thousand/uL    RBC 4 28 (L) 4 70 - 6 10 Million/uL    Hemoglobin 12 7 (L) 14 0 - 18 0 g/dL    Hematocrit 38 2 (L) 42 0 - 52 0 %    MCV 89 82 - 98 fL    MCH 29 7 27 0 - 31 0 pg    MCHC 33 2 31 4 - 37 4 g/dL    RDW 15 0 11 6 - 15 1 %    MPV 7 1 (L) 8 9 - 12 7 fL    Platelets 324 (H) 666 - 400 Thousands/uL    nRBC 0 /100 WBCs    Neutrophils Relative 78 (H) 43 - 75 %    Lymphocytes Relative 12 (L) 14 - 44 %    Monocytes Relative 8 4 - 12 %    Eosinophils Relative 1 0 - 6 %    Basophils Relative 0 0 - 1 %    Neutrophils Absolute 9 10 (H) 1 85 - 7 62 Thousands/µL    Lymphocytes Absolute 1 40 0 60 - 4 47 Thousands/µL    Monocytes Absolute 1 00 0 17 - 1 22 Thousand/µL    Eosinophils Absolute 0 10 0 00 - 0 61 Thousand/µL    Basophils Absolute 0 00 0 00 - 0 10 Thousands/µL   Comprehensive metabolic panel   Result Value Ref Range    Sodium 132 (L) 136 - 145 mmol/L    Potassium 4 2 3 5 - 5 3 mmol/L    Chloride 94 (L) 100 - 108 mmol/L    CO2 25 21 - 32 mmol/L    Anion Gap 13 4 - 13 mmol/L    BUN 25 5 - 25 mg/dL    Creatinine 1 44 (H) 0 60 - 1 30 mg/dL    Glucose 265 (H) 65 - 140 mg/dL    Calcium 9 8 8 3 - 10 1 mg/dL    AST 87 (H) 5 - 45 U/L     (H) 12 - 78 U/L    Alkaline Phosphatase 543 (H) 46 - 116 U/L    Total Protein 7 0 6 4 - 8 2 g/dL    Albumin 2 4 (L) 3 5 - 5 0 g/dL    Total Bilirubin 0 40 0 20 - 1 00 mg/dL    eGFR 54 ml/min/1 73sq m   Protime-INR   Result Value Ref Range    Protime 12 4 (H) 9 4 - 11 7 seconds    INR 1 18 (H) 0 86 - 1 16   APTT   Result Value Ref Range    PTT 27 24 - 33 seconds   Troponin I   Result Value Ref Range    Troponin I <0 02 <=0 04 ng/mL   CKMB   Result Value Ref Range    CK-MB Index <1 0 0 0 - 2 5 %    CK-MB FRACTION 0 6 0 0 - 5 0 ng/mL   ECG 12 lead   Result Value Ref Range    Ventricular Rate 107 BPM    Atrial Rate 107 BPM    NC Interval 118 ms    QRSD Interval 92 ms    QT Interval 348 ms    QTC Interval 464 ms    P [place] 69 degrees    QRS Axis 62 degrees    T Wave Axis 70 degrees   Fingerstick Glucose (POCT)   Result Value Ref Range    POC Glucose 276 (H) 65 - 140 mg/dl   Smear Review(Phlebs Do Not Order)   Result Value Ref Range    Toxic Granulation Present     Platelet Estimate Increased (A) Adequate     Imaging:  CT spine thoracic & lumbar wo contrast         CT cervical spine without contrast   Final Result   1  Stable degenerative changes  No cervical spine fracture or traumatic malalignment  2   Possible bony metastatic disease involving the clivus and C3 vertebral body, stable  3   Worsening pulmonary metastatic disease  The major findings are in agreement with the preliminary report provided by [facility] which was provided shortly after completion of the exam  The additional finding of  worsening pulmonary metastatic disease,   will now be communicated with    patient's clinical team by our radiology liaison  Workstation performed: [id number]             Code Status: Full Code       Counseling / Coordination of Care: Total floor / unit time spent today 30 minutes             Sher Laguerre MD

## 2017-12-20 NOTE — PLAN OF CARE
Problem: DISCHARGE PLANNING - CARE MANAGEMENT  Goal: Discharge to post-acute care or home with appropriate resources  INTERVENTIONS:  - Conduct assessment to determine patient/family and health care team treatment goals, and need for post-acute services based on payer coverage, community resources, and patient preferences, and barriers to discharge  - Address psychosocial, clinical, and financial barriers to discharge as identified in assessment in conjunction with the patient/family and health care team  - Arrange appropriate level of post-acute services according to patient's   needs and preference and payer coverage in collaboration with the physician and health care team  - Communicate with and update the patient/family, physician, and health care team regarding progress on the discharge plan  - Arrange appropriate transportation to post-acute venues  DCP IS HOME WITH SERVICES VS STR PENDING PROGRESS  Outcome: Progressing

## 2017-12-20 NOTE — CONSULTS
Hematology Oncology Consult Report    Kar Merino, 1960, [id number]  2 [facility] 204/2 [address] [address]    Impression and plan:    3 78-year-old male with metastatic colon cancer (liver, lungs, bones)  Patient has a poor performance status  Mr Onofre Canales has never received chemotherapy on time  Recent scans demonstrated disease progression  With the patient's present situation/condition, I do not see Mr Onofre Canales ever getting better to a point where he would enjoy a good quality of life  I would be very reluctant to continue chemotherapy with this patient (unless for example patient was institutionalized or moved in with a relative who would take responsibility for office visits, chemotherapy, scanning, blood work etc)  Respectfully, I do not believe this will be an eventual possibility  Repeat CEA level has been requested to further demonstrate evidence for disease progression  2  Pain control  The cancer and bone metastases obviously will cause pain  This is exacerbated by the patient's prior difficult narcotic history  At this point, I think it absolute crucial that patient's pain be controlled any way possible (this is the only thing left that we can offer this patient)  Once again, I do not believe this patient is capable of being discharged to home on a narcotic regimen  3  Bone metastases - no clear evidence of cord involvement  Once again, it is important to control the patient's pain  I do not believe that radiation to the lesions would be beneficial for the patient  If patient was to need emergent RT, Mr Onofre Canales would need to be transferred to [place]  4  Elevated BUN and creatinine  This may be due to patient's inability to care for herself at home/dehydration  Rehydration is ongoing  5  Hyponatremia  This may be due to the pulmonary lesions  The sodium level was not terribly decreased - needs to be monitored  6  Discharge disposition   Social service in case management is evaluating the patient  Patient has a complicated social history  Mr Pool Macias lives by himself; the closest relative is his sister in [place]  Sister apparently wants the patient transferred to [place] - Mr Pool Macias would clearly fare better with family members close by  I have discussed hospice with the patient in the past - Mr Pool Macias was not very willing to discuss this  This clearly should be re-discussed  Please do not hesitate to contact the Hematology service if you have any questions or concerns  Thank you for this consult  Chief complaint:  Status post fall, metastatic colon cancer    History of present illness: This is a 64-year-old male admitted with the above  Mr Pool Macias has a complicated colon cancer history (information is listed below in blue)  Patient was first seen by hematology/oncology in May 2017  Workup demonstrated a colon mass as well as liver lesions  Biopsy of the liver and colon demonstrated carcinoma  Patient had been treated with chemotherapy but had been very noncompliant with follow-ups and receiving the treatments (rarely given on time)  More recently, patient decided to discontinue all medications  Mr Pool Macias was seen in the office in early December 2017 wishing to resume chemotherapy  Although the specifics are not clear, patient has a history of falling in the past (including falling in the oncology office previously)  Patient has had issues with narcotics and pain control  Although the specifics are not entirely clear, Mr Pool Macias is not fully capable of understanding the seriousness of the situation  Patient has a difficult living situation - closest family member is in [place]  Cancer history: [employer] note 12/4/17     HPI 64-year-old male previously referred for the above  Workup was consistent with metastatic colon cancer  Mr Pool Macias had been on FOLFOX/Avastin but recently decided not to continue with oncology   Patient was last seen in this office in the end of August 2017  Mr Coral Truong stated that he developed severe abdominal pain approximately 7 years ago  Patient was seen by Dr Man Bedolla and underwent emergency surgery  Although the specifics are not presently available, patient stated that a section of his bowel (small cell versus large) was removed because of necrosis  Reportedly there was no evidence of malignancy  More recently, patient has had problems with nausea, abdominal pain and bowel changes  Office note by Dr Jeanne Irvin from May 23, 2017 stated that patient had a cecal mass that was biopsied and found to be infiltrative carcinoma  Workup demonstrated a colon mass, liver lesions and pulmonary lesions  A colonoscopy demonstrated adenocarcinoma; liver biopsy demonstrated the same  Discussion summary    27-year-old male with multiple medical problems previously found to have a colon mass, liver lesions and pulmonary lesions  Recent biopsies demonstrated M1/stage IV colon cancer  Issues:    1  Patient recently underwent repeat scans  The results are not available but demonstrated disease progression  We discussed this  Patient wishes to resume chemotherapy  The plan is to start FOLFIRI/Avastin  Nursing staff spent time with patient today going over the specifics  This office will get the most recent test results to the chart  Regimen: FOLFIRI + Avastin  Leucovorin 400 mg/meter squared IV day 1   5- mg/meter squared IV day 1   Irinotecan 180 mg/meter squared IV day 1  Continuous infusion 5-FU 1200 mg/meter squared begin on day 1 x 46 hours  Avastin 5mg/kg, Day 1  Cycle is 14 days  goal = prolongation of life    2  Pain control issues  Patient states that pain is relatively controlled  Patient sees a pain      Patient is to return in 4 weeks  Mr Coral Truong knows to call if he has any other oncology questions or concerns  Carefully review your medication list and verify that the list is accurate and up-to-date   Please call the hematology/oncology office if there are medications missing from the list, medications on the list that you are not currently taking or if there is a dosage or instruction that is different from how you're taking a medication  Past medical history:   Past Medical History:   Diagnosis Date    Anxiety     CAD (coronary artery disease)     Cancer ([facility] )     lung-right    Cardiac disease     Chest pain     small mass in the heart-found on a stress test 5/18/17    [facility]     CPAP (continuous positive airway pressure) dependence     Diabetes mellitus ([place] [address] )     Hyperlipidemia     Hypertension     Myocardial infarction 2008    Obstructive sleep apnea on CPAP     Spinal stenosis     Stroke ([place] [address] ) 2008    heat stroke, minor stroke     Past surgical history:   Past Surgical History:   Procedure Laterality Date    ANGIOPLASTY  2009    2 stents    APPENDECTOMY      COLON SURGERY  2010    16 inches removed-obstruction    COLONOSCOPY      CORONARY STENT PLACEMENT  2009    [place] COLSC FLX W/RMVL OF TUMOR POLYP LESION SNARE TQ N/A 5/23/2017    Procedure: COLONOSCOPY and biopsy and snare polypectomy ;  Surgeon: Kaushik Travis MD;  Location: [facility] GI [facility]; Service: Gastroenterology    [place] EGD TRANSORAL BIOPSY SINGLE/MULTIPLE N/A 5/23/2017    Procedure: ESOPHAGOGASTRODUODENOSCOPY (EGD) and biopsy ;  Surgeon: Kaushik Travis MD;  Location: [facility]; Service: Gastroenterology    TONSILLECTOMY       Allergies:    Allergies   Allergen Reactions    Tobramycin Rash    Penicillins Hives    Latex Rash     Home medications:   Prescriptions Prior to Admission   Medication    albuterol (2 5 mg/3 mL) 0 083 % nebulizer solution    albuterol (PROAIR HFA) 90 mcg/act inhaler    baclofen 10 mg tablet    CANDIDO MICROLET LANCETS lancets    clindamycin (CLEOCIN) 300 MG capsule    clopidogrel (PLAVIX) 75 mg tablet    famotidine (PEPCID) 40 MG tablet    glipiZIDE (GLUCOTROL) 10 mg tablet    insulin detemir (LEVEMIR FLEXPEN) 100 Units/mL subcutaneous injection pen    Insulin Pen Needle (RELION SHORT PEN NEEDLES) 31G X 8 MM MISC    lisinopril (ZESTRIL) 10 mg tablet    lovastatin (MEVACOR) 40 MG tablet    metFORMIN (GLUCOPHAGE) 500 mg tablet    metoprolol tartrate (LOPRESSOR) 25 mg tablet    oxyCODONE (OXY-IR) 5 MG capsule    PARoxetine (PAXIL) 30 mg tablet    SURE COMFORT LANCETS 30G MISC    tamsulosin (FLOMAX) 0 4 mg    tiotropium (SPIRIVA) 18 mcg inhalation capsule    Tobramycin 28 MG CAPS    isosorbide mononitrate (IMDUR) 30 mg 24 hr tablet     Hospital medications:   Current Facility-Administered Medications:     acetaminophen (TYLENOL) tablet 650 mg, 650 mg, Oral, Q6H PRN, Sher Laguerre MD    albuterol (PROVENTIL HFA,VENTOLIN HFA) inhaler 1 puff, 1 puff, Inhalation, Q6H PRN, Sher Laguerre MD    baclofen tablet 10 mg, 10 mg, Oral, BID, Sher Laguerre MD, 10 mg at 12/20/17 1340    clopidogrel (PLAVIX) tablet 75 mg, 75 mg, Oral, Daily, Sher Laguerre MD, 75 mg at 12/20/17 1340    [START ON 12/21/2017] famotidine (PEPCID) tablet 20 mg, 20 mg, Oral, Daily, Sher Laguerre MD    glipiZIDE (GLUCOTROL) tablet 10 mg, 10 mg, Oral, BID AC, Sher Laguerre MD, 10 mg at 12/20/17 1528    heparin (porcine) subcutaneous injection 5,000 Units, 5,000 Units, Subcutaneous, Q8H [address], 5,000 Units at 12/20/17 1340 **AND** Platelet count, , , Once, Sher Laguerre MD    HYDROmorphone (DILAUDID) 2 mg/mL injection 2 mg, 2 mg, Intravenous, Q4H PRN, Sher Laguerre MD, 2 mg at 12/20/17 1434    insulin detemir (LEVEMIR) subcutaneous injection 40 Units, 40 Units, Subcutaneous, HS, Sher Laguerre MD    insulin lispro (HumaLOG) 100 units/mL subcutaneous injection 1-6 Units, 1-6 Units, Subcutaneous, TID AC, 1 Units at 12/20/17 1638 **AND** Fingerstick Glucose (POCT), , , TID AC, Sher Laguerre MD    nicotine (NICODERM CQ) 21 mg/24 hr TD 24 hr patch 21 mg, 21 mg, Transdermal, Daily, Jonah Thorne MD, 21 mg at 12/20/17 1341    PARoxetine (PAXIL) tablet 30 mg, 30 mg, Oral, HS, Jonah Thorne MD    pravastatin (PRAVACHOL) tablet 40 mg, 40 mg, Oral, Daily With Ciarra Rain MD, 40 mg at 12/20/17 1639    sodium chloride 0 9 % infusion, 125 mL/hr, Intravenous, Continuous, Enola Goldberg, MD, Last Rate: 125 mL/hr at 12/20/17 1137, 125 mL/hr at 12/20/17 1137    tamsulosin (FLOMAX) capsule 0 4 mg, 0 4 mg, Oral, QAM, Jonah Thorne MD, 0 4 mg at 12/20/17 1340    tiotropium (SPIRIVA) capsule for inhaler 18 mcg, 18 mcg, Inhalation, Daily, Jonah Thorne MD    Facility-Administered Medications Ordered in Other Encounters:     alteplase (CATHFLO) injection 2 mg, 2 mg, Intracatheter, Once PRN, Maris Montenegro MD    atropine injection 0 25 mg, 0 25 mg, Intravenous, Once, Maris Montenegro MD    fluorouracil (ADRUCIL) injection 808 mg, 808 mg, Intravenous, Once, Maris Montenegro MD    irinotecan (CAMPTOSAR) 380 mg in sodium chloride 0 9 % 500 mL chemo infusion, 380 mg, Intravenous, Once, Maris Montenegro MD    leucovorin 800 mg in sodium chloride 0 9 % 250 mL IVPB, 800 mg, Intravenous, Once, Maris Montenegro MD    ondansetron [facility]) 16 mg, dexamethasone (DECADRON) 10 mg in sodium chloride 0 9 % 50 mL IVPB, , Intravenous, Once, Maris Montenegro MD    sodium chloride 0 9 % infusion, 20 mL/hr, Intravenous, Once, Maris Montenegro MD    Social history: single, unemployed, disabled, one pack per day for many years, no known toxic exposure, patient has had issues with pain control and narcotic use in the past    Family history:   Family History   Problem Relation Age of Onset    Heart disease Mother      pacemaker    Diabetes Mother     COPD Father     Diabetes Father     Heart disease Father      pacemaker    Heart disease Brother      massive MI     Review of systems: deferred    Physical exam  Vitals:    12/20/17 1348   BP: 95/54   Pulse: 98   Resp: 18   Temp: (!) 96 °F (35 6 °C)   SpO2: 94%   Middle-aged male in no apparent distress in MRI suite  Skin: Relatively good color, scattered ecchymoses, no petechiae  Extremities: 1+ bilateral lower extremity edema    Laboratory test results    Results for Aarti Kim (MRN [id number]) as of 12/20/2017 18:46   Ref  Range 12/20/2017 03:29   WBC Latest Ref Range: 4 80 - 10 80 Thousand/uL 11 60 (H)   RBC Latest Ref Range: 4 70 - 6 10 Million/uL 4 28 (L)   Hemoglobin Latest Ref Range: 14 0 - 18 0 g/dL 12 7 (L)   Hematocrit Latest Ref Range: 42 0 - 52 0 % 38 2 (L)   MCV Latest Ref Range: 82 - 98 fL 89   MCH Latest Ref Range: 27 0 - 31 0 pg 29 7   MCHC Latest Ref Range: 31 4 - 37 4 g/dL 33 2   RDW Latest Ref Range: 11 6 - 15 1 % 15 0   Platelets Latest Ref Range: 130 - 400 Thousands/uL 422 (H)   MPV Latest Ref Range: 8 9 - 12 7 fL 7 1 (L)   Platelet Estimate Latest Ref Range: Adequate  Increased (A)   nRBC Latest Units: /100 WBCs 0   Neutrophils Relative Latest Ref Range: 43 - 75 % 78 (H)   Lymphocytes Relative Latest Ref Range: 14 - 44 % 12 (L)   Monocytes Relative Latest Ref Range: 4 - 12 % 8   Eosinophils Relative Latest Ref Range: 0 - 6 % 1   Basophils Relative Latest Ref Range: 0 - 1 % 0     Results for Aarti Kim (MRN [id number]) as of 12/20/2017 18:46   Ref   Range 12/20/2017 03:29   Sodium Latest Ref Range: 136 - 145 mmol/L 132 (L)   Potassium Latest Ref Range: 3 5 - 5 3 mmol/L 4 2   Chloride Latest Ref Range: 100 - 108 mmol/L 94 (L)   CO2 Latest Ref Range: 21 - 32 mmol/L 25   Anion Gap Latest Ref Range: 4 - 13 mmol/L 13   BUN Latest Ref Range: 5 - 25 mg/dL 25   Creatinine Latest Ref Range: 0 60 - 1 30 mg/dL 1 44 (H)   Glucose Latest Ref Range: 65 - 140 mg/dL 265 (H)   Calcium Latest Ref Range: 8 3 - 10 1 mg/dL 9 8   AST Latest Ref Range: 5 - 45 U/L 87 (H)   ALT Latest Ref Range: 12 - 78 U/L 180 (H)   Alkaline Phosphatase Latest Ref Range: 46 - 116 U/L 543 (H)   Total Protein Latest Ref Range: 6 4 - 8 2 g/dL 7 0   Albumin Latest Ref Range: 3 5 - 5 0 g/dL 2 4 (L)   Total Bilirubin Latest Ref Range: 0 20 - 1 00 mg/dL 0 40   eGFR Latest Units: ml/min/1 73sq m 54       Prior blood work results    11/30/17 BUN = 15 creatinine = 0 81 calcium = 9 2 AST = 83 ALT = 145 total bilirubin = 0 40 alkaline phosphatase = 394    WBC = 11 5 hemoglobin = 13 hematocrit = 38 8 platelet = 227 neutrophil = 70%    7/27/17 WBC = 7 7 hemoglobin = 13 5 hematocrit = 40 platelet = 545 neutrophil = 50% BUN = 12 creatinine = 0 96 LFTs WNL  7/13/17 WBC = 6 2, hemoglobin = 13 1, hematocrit = 39 5, platelet count = 030, BUN = 11, creatinine = 0 95, calcium = 9 0, LFTs = WNL, alkaline phosphatase 251, urinalysis demonstrates trace ketones without proteinuria  6/19/17 WBC = 11 0, hemoglobin = 12 8, hematocrit = 37 9 0, platelet count = 024, BUN = 15, creatinine 0 74, calcium = 9 2, LFTs = WNL, alkaline phosphatase 248  6/6/17 WBC = 8 3 hemoglobin = 12 1 hematocrit = 36 9 platelet = 689 BUN = 11 creatinine = 0 77  5/19/17 BUN = 6 creatinine = 0 65 WBC = 8 3 hemoglobin = 12 9 hematocrit = 38 9 platelet = 681 CEA = 90 2  5/1/17 BUN = 19 creatinine = 0 98 calcium = 9 9 AST = 80 = high ALT = 139 = high alkaline phosphatase = 236 = high total protein = 7 7 total bilirubin = 0 50 WBC = 12 6 hemoglobin = 15 6 hematocrit = 48 MCV = 89 RDW = 12 9 platelet = 743 neutrophil = 64% lymphocyte = 24% monocyte = 9% eosinophil = 3%      Radiology reports    12/20/17 CT cervical spine  1  Stable degenerative changes  No cervical spine fracture or traumatic malalignment  2   Possible bony metastatic disease involving the clivus and C3 vertebral body, stable  3   Worsening pulmonary metastatic disease  12/20/17 CT of thoracic and lumbar spine  1  Metastatic disease of the L5 vertebral body  There is compression deformity of the posterior, superior endplate with bony retropulsion  This results in moderate central stenosis and severe bilateral lateral recess narrowing    The findings are suspicious for acute, pathologic compression fracture  2   Acute, nondisplaced fracture posterior aspect of the right 9th rib, likely through pathologic bone lesion  No evidence of pneumothorax  3   Acute, mildly displaced fracture through pathologic bone lesion involving the posterior medial aspect of the right 11th rib  No evidence of pneumothorax  4   Significant worsening of bony metastatic disease  5   Worsening pulmonary metastatic disease  12/20/17 MRI C-spine   Multiple metastatic lesions of the clivus, cervical spine and thoracic spine with marrow edema most prominent in the left side of the C5 vertebral body  12/20/17 MRI lumbar spine   Extensive diffuse metastatic disease throughout the lower thoracic spine, lumbar spine and imaged portions of the sacrum and bilateral tacho    Metastatic disease at L5 extends into the central canal and likely impinges multiple left-sided nerve roots  Critical central stenosis is not present  Prior scans    5/1/17 CTA/chest did not demonstrate any evidence of a pulmonary embolism  Patient had bilateral pulmonary nodules and worsening mediastinal and hilar adenopathy suspicious for metastatic disease  The largest lymph nodes measured 3 6 x 2 8 cm in the right paratracheal region  Patient had 2 0 x 2 1 cm lymph nodes in the right pericardiac region  5/1/17 CAT scan of the abdomen and pelvis demonstrated multiple low-density lesions throughout both lobes of the fatty liver including a 4 2 x 4 4 cm mass in the right hepatic dome and a 5 5 x 4 9 mass in the lateral segment of the left lobe  There was an ill-defined lobular mass at the base of the cecum concerning for primary malignancy with metastatic iliocolic and retroperitoneal adenopathy  There was an asymmetric enlargement and an ill definition of the left iliacus muscle concerning for retroperitoneal hematoma    5/1/17 CAT scan of the lumbar spine demonstrated mild inflammatory changes surrounding the prominent iliacus muscle, retroperitoneal hematoma suspected  Patient also had retroperitoneal adenopathy        Pathology     17 date of report 17 [employer] N[employer] KRAS [employer] mutation for K-seb p [id number]     17 liver, 6 cores demonstrated metastatic adenocarcinoma in the background of dense fibrosis and necrosis compatible with a colonic origin  17 large intestine, cecum demonstrated moderately differentiated adenocarcinoma  Mismatch repair protein panel demonstrated all genes to be expressed

## 2017-12-20 NOTE — ED NOTES
Spoke with [facility] resident and made aware of BP and patient's pain  States he will place orders       Perfecto Mario RN  12/20/17 0786

## 2017-12-20 NOTE — ED PROVIDER NOTES
History  Chief Complaint   Patient presents with   Alex Padilla     fell out of wheelchair at 12:30 this morning when trying to transfer onto toilet, laid on floor for 2 1/2 hours     50-year-old  male with metastatic lung CA fell out of his wheelchair tonight when trying to transfer to the toilet  Patient states he laid on the floor for several hours before somebody came to help him  Patient has a history of spinal stenosis  Patient complains of mid to upper back pain  No obvious deformity, no fever, no chest pain, no shortness of breath  History provided by:  Patient  Fall   Mechanism of injury: fall    Injury location:  Torso  Torso injury location:  Back  Incident location:  Home  Time since incident:  4 hours  Arrived directly from scene: yes    Fall:     Fall occurred: In the bathroom (From wheelchair during transfer)    Impact surface:  Hard floor  Suspicion of alcohol use: no    Suspicion of drug use: no    Prior to arrival data:     Patient ambulatory at scene: no      Blood loss:  None    Responsiveness at scene:  Alert    Loss of consciousness: no      Amnesic to event: no    Associated symptoms: back pain    Associated symptoms: no abdominal pain, no blindness, no chest pain, no difficulty breathing, no loss of consciousness, no nausea, no neck pain and no vomiting        Prior to Admission Medications   Prescriptions Last Dose Informant Patient Reported? Taking? CANDIDO MICROLET LANCETS lancets 12/19/2017 at Unknown time  Yes Yes   Sig: Candido Microlet Lancets Miscellaneous USE AS DIRECTED    Quantity: 1;  Refills: 6    Gerhard ROLDAN D ;  Started 14-Feb-2014 [id number] EA Package   Insulin Pen Needle (RELION SHORT PEN NEEDLES) 31G X 8 MM MISC 12/19/2017 at Unknown time  Yes Yes   Sig: ReliOn Short Pen Needles 31G X 8 MM Miscellaneous USE AS DIRECTED ONCE DAILY AT BEDTIME  Quantity: 50;  Refills: 1    Tyrese CHAMORRO ;  Started 18-Feb-2014 Active   PARoxetine (PAXIL) 30 mg tablet 12/19/2017 at Unknown time  Yes Yes   Si mg daily at bedtime PARoxetine HCl - 30 MG Oral Tablet TAKE ONE TABLET (30MG) BY MOUTH ONCE DAILY  Quantity: 30;  Refills: 5    Saskia ARREDONDO ;  Started 5-Dec-2014 Active    SURE COMFORT LANCETS 30G MISC 2017 at Unknown time  Yes Yes   Sig: Sure Comfort Lancets 30G Miscellaneous USE AS DIRECTED  Quantity: 1;  Refills: 0    Warren CHAMORRO ; Jeannette Canavan Miscellaneous Box   Tobramycin 28 MG CAPS 2017 at Unknown time  Yes Yes   Sig: Take by mouth 3 (three) times a day   albuterol (2 5 mg/3 mL) 0 083 % nebulizer solution 2017 at Unknown time  Yes Yes   Sig: Albuterol Sulfate (2 5 MG/3ML) 0 083% Inhalation Nebulization Solution USE 1 UNIT DOSE EVERY 4-6 HOURS AS NEEDED FOR WHEEZING   Quantity: 1;  Refills: 3    Zee CHAMORRO ;  Started 2014 Active3 ML Angela    [facility]ol [facility] 90 mcg/act inhaler 2017 at Unknown time  Yes Yes   Si puff 2 (two) times a day ProAir  (90 Base) MCG/ACT Inhalation Aerosol Solution INHALE ONE TO TWO PUFFS BY MOUTH EVERY 4 TO 6 HOURS AS NEEDED  Quantity: 18;   Refills: 5    Ravinder CHAMORRO ;  Started 16-Oct-2014 Active    baclofen 10 mg tablet 2017 at Unknown time  Yes Yes   Sig: Take 10 mg by mouth 2 (two) times a day     clindamycin (CLEOCIN) 300 MG capsule 2017 at Unknown time  Yes Yes   Sig: Take 300 mg by mouth 3 (three) times a day   clopidogrel (PLAVIX) 75 mg tablet 2017 at Unknown time  Yes Yes   Sig: Take 75 mg by mouth daily   famotidine (PEPCID) 40 MG tablet 2017 at Unknown time  Yes Yes   Sig: Take 40 mg by mouth daily   glipiZIDE (GLUCOTROL) 10 mg tablet 2017 at Unknown time  Yes Yes   Sig: GlipiZIDE 10 MG Oral Tablet TAKE TWO TABLETS BY MOUTH TWICE DAILY  Quantity: 120;  Refills: 5    Canda Vinod CHAMORRO ;  Started  Active   insulin detemir (LEVEMIR FLEXPEN) 100 Units/mL subcutaneous injection pen 2017 at Unknown time  Yes Yes   Si Units daily at bedtime Levemir FlexPen 100 UNIT/ML SOPN USE AS DIRECTED  INJECT 40 UNITS SUBCUTANEOUSLY AT BEDTIME  Quantity: 1;  Refills: 5    Anice Kansas M D ;  Started 27-Aug-2014 Active3 ML Pen (5 Pens)    isosorbide mononitrate (IMDUR) 30 mg 24 hr tablet   No No   Sig: Take 1 tablet by mouth daily for 30 days   Patient taking differently: Take 30 mg by mouth every morning     lisinopril (ZESTRIL) 10 mg tablet 12/19/2017 at Unknown time  Yes Yes   Sig: Take 10 mg by mouth every morning   lovastatin (MEVACOR) 40 MG tablet 12/19/2017 at Unknown time  Yes Yes   Sig: Take 40 mg by mouth daily at bedtime   metFORMIN (GLUCOPHAGE) 500 mg tablet 12/19/2017 at Unknown time  Yes Yes   Sig: Take 500 mg by mouth 2 (two) times a day with meals  metoprolol tartrate (LOPRESSOR) 25 mg tablet 12/19/2017 at Unknown time  Yes Yes   Sig: Take 25 mg by mouth every morning   oxyCODONE (OXY-IR) 5 MG capsule 12/19/2017 at Unknown time  No Yes   Sig: Take 1 5 tablets every 6 hours as needed     Patient taking differently: 10 mg every 6 (six) hours as needed for severe pain     tamsulosin (FLOMAX) 0 4 mg 12/19/2017 at Unknown time  Yes Yes   Sig: Take 0 4 mg by mouth every morning     tiotropium (SPIRIVA) 18 mcg inhalation capsule 12/19/2017 at Unknown time  Yes Yes   Sig: Place 18 mcg into inhaler and inhale daily      Facility-Administered Medications: None       Past Medical History:   Diagnosis Date    Anxiety     CAD (coronary artery disease)     [facility]     lung-right    Cardiac disease     Chest pain     small mass in the heart-found on a stress test 5/18/17    COPD (chronic obstructive pulmonary disease) [facility]     CPAP (continuous positive airway pressure) dependence     Diabetes mellitus ([facility] )     Hyperlipidemia     Hypertension     Myocardial infarction 2008    Obstructive sleep apnea on CPAP     Spinal stenosis     Stroke [facility] 2008    heat stroke, minor stroke       Past Surgical History:   Procedure Laterality Date    ANGIOPLASTY  2009    2 stents    APPENDECTOMY      COLON SURGERY  2010    16 inches removed-obstruction    COLONOSCOPY      CORONARY STENT PLACEMENT  2009    OH COLSC FLX W/RMVL OF TUMOR POLYP LESION SNARE TQ N/A 5/23/2017    Procedure: COLONOSCOPY and biopsy and snare polypectomy ;  Surgeon: Frank Colunga MD;  Location: [facility] GI [facility]; Service: Gastroenterology    [place] EGD TRANSORAL BIOPSY SINGLE/MULTIPLE N/A 5/23/2017    Procedure: ESOPHAGOGASTRODUODENOSCOPY (EGD) and biopsy ;  Surgeon: Frank Colunga MD;  Location: [facility]; Service: Gastroenterology    TONSILLECTOMY         Family History   Problem Relation Age of Onset    Heart disease Mother      pacemaker    Diabetes Mother     COPD Father     Diabetes Father     Heart disease Father      pacemaker    Heart disease Brother      massive MI     I have reviewed and agree with the history as documented  Social History   Substance Use Topics    Smoking status: Current Every Day Smoker     Packs/day: 1 00     Years: 40 00     Types: Cigarettes, E-Cigarettes    Smokeless tobacco: Never Used      Comment: quit 2 weeks ago    Alcohol use No        Review of Systems   Constitutional: Negative  Negative for chills and fever  HENT: Negative for trouble swallowing  Eyes: Negative for blindness  Respiratory: Negative  Negative for cough, chest tightness, shortness of breath, wheezing and stridor  Cardiovascular: Negative for chest pain  Gastrointestinal: Negative for abdominal pain, nausea and vomiting  Genitourinary: Negative for dysuria  Musculoskeletal: Positive for back pain and gait problem  Negative for neck pain and neck stiffness  Skin: Negative  Neurological: Negative for loss of consciousness, syncope and light-headedness  Hematological: Negative  Psychiatric/Behavioral: Negative  All other systems reviewed and are negative        Physical Exam  ED Triage Vitals   Temperature Pulse Respirations Blood Pressure SpO2   12/20/17 0259 12/20/17 0259 12/20/17 0259 12/20/17 0302 12/20/17 0259   98 5 °F (36 9 °C) (!) 113 22 (!) 193/52 97 %      Temp Source Heart Rate Source Patient Position - Orthostatic VS BP Location FiO2 (%)   12/20/17 0259 12/20/17 0259 12/20/17 0259 12/20/17 0259 --   Oral Monitor Lying Left arm       Pain Score       12/20/17 0259       Worst Possible Pain           Orthostatic Vital Signs  Vitals:    12/20/17 0259 12/20/17 0302 12/20/17 0633 12/20/17 0639   BP:  (!) 193/52 (!) 85/51 109/64   Pulse: (!) 113  (!) 107    Patient Position - Orthostatic VS: Lying  Lying        Physical Exam   Constitutional: He is oriented to person, place, and time  He appears well-developed and well-nourished  HENT:   Head: Normocephalic and atraumatic  Right Ear: External ear normal    Left Ear: External ear normal    Nose: Nose normal    Mouth/Throat: Oropharynx is clear and moist    Eyes: Conjunctivae and EOM are normal    Neck: Normal range of motion  Neck supple  Cardiovascular: Normal rate, regular rhythm, normal heart sounds and intact distal pulses  Pulmonary/Chest: Effort normal and breath sounds normal    Abdominal: Soft  Bowel sounds are normal    Musculoskeletal: Normal range of motion  Neurological: He is alert and oriented to person, place, and time  Skin: Skin is warm and dry  Capillary refill takes less than 2 seconds  Nursing note and vitals reviewed        ED Medications  Medications   morphine (PF) 4 mg/mL injection 4 mg (4 mg Intravenous Given 12/20/17 0332)   HYDROmorphone (DILAUDID) 1 mg/mL injection 1 mg (1 mg Intravenous Given 12/20/17 0500)       Diagnostic Studies  Results Reviewed     Procedure Component Value Units Date/Time    CKMB [[id number]]  (Normal) Collected:  12/20/17 0329    Lab Status:  Final result Specimen:  Blood from Arm, Left Updated:  12/20/17 0516     CK-MB Index <1 0 %      CK-MB FRACTION 0 6 ng/mL     CK Total with Reflex CKMB [[id number]] (Normal) Collected:  12/20/17 0329    Lab Status:  Final result Specimen:  Blood from Arm, Left Updated:  12/20/17 0412     Total  U/L     Comprehensive metabolic panel [[id number]]  (Abnormal) Collected:  12/20/17 0329    Lab Status:  Final result Specimen:  Blood from Arm, Left Updated:  12/20/17 0408     Sodium 132 (L) mmol/L      Potassium 4 2 mmol/L      Chloride 94 (L) mmol/L      CO2 25 mmol/L      Anion Gap 13 mmol/L      BUN 25 mg/dL      Creatinine 1 44 (H) mg/dL      Glucose 265 (H) mg/dL      Calcium 9 8 mg/dL      AST 87 (H) U/L       (H) U/L      Alkaline Phosphatase 543 (H) U/L      Total Protein 7 0 g/dL      Albumin 2 4 (L) g/dL      Total Bilirubin 0 40 mg/dL      eGFR 54 ml/min/1 73sq m     Narrative:         National Kidney Disease Education Program recommendations are as follows:  GFR calculation is accurate only with a steady state creatinine  Chronic Kidney disease less than 60 ml/min/1 73 sq  meters  Kidney failure less than 15 ml/min/1 73 sq  meters  Troponin I [[id number]]  (Normal) Collected:  12/20/17 0329    Lab Status:  Final result Specimen:  Blood from Arm, Left Updated:  12/20/17 0357     Troponin I <0 02 ng/mL     Narrative:         [employer] Chemistry analyzer 99% cutoff is > 0 04 ng/mL in network labs    o cTnI 99% cutoff is useful only when applied to patients in the clinical setting of myocardial ischemia  o cTnI 99% cutoff should be interpreted in the context of clinical history, ECG findings and possibly cardiac imaging to establish correct diagnosis  o cTnI 99% cutoff may be suggestive but clearly not indicative of a coronary event without the clinical setting of myocardial ischemia      CBC and differential [[id number]]  (Abnormal) Collected:  12/20/17 0329    Lab Status:  Final result Specimen:  Blood from Arm, Left Updated:  12/20/17 0351     WBC 11 60 (H) Thousand/uL      RBC 4 28 (L) Million/uL      Hemoglobin 12 7 (L) g/dL      Hematocrit 38 2 (L) %      MCV 89 fL      MCH 29 7 pg      MCHC 33 2 g/dL      RDW 15 0 %      MPV 7 1 (L) fL      Platelets 270 (H) Thousands/uL      nRBC 0 /100 WBCs      Neutrophils Relative 78 (H) %      Lymphocytes Relative 12 (L) %      Monocytes Relative 8 %      Eosinophils Relative 1 %      Basophils Relative 0 %      Neutrophils Absolute 9 10 (H) Thousands/µL      Lymphocytes Absolute 1 40 Thousands/µL      Monocytes Absolute 1 00 Thousand/µL      Eosinophils Absolute 0 10 Thousand/µL      Basophils Absolute 0 00 Thousands/µL     Protime-INR [[id number]]  (Abnormal) Collected:  12/20/17 0329    Lab Status:  Final result Specimen:  Blood from Arm, Left Updated:  12/20/17 0350     Protime 12 4 (H) seconds      INR 1 18 (H)    APTT [[id number]]  (Normal) Collected:  12/20/17 0329    Lab Status:  Final result Specimen:  Blood from Arm, Left Updated:  12/20/17 0350     PTT 27 seconds     Narrative: Therapeutic Heparin Range = 60-90 seconds    Fingerstick Glucose (POCT) [[id number]]  (Abnormal) Collected:  12/20/17 0342    Lab Status:  Final result Updated:  12/20/17 0344     POC Glucose 276 (H) mg/dl     UA w Reflex to Microscopic w Reflex to Culture [[id number]]     Lab Status:  No result Specimen:  Urine                  CT spine thoracic & lumbar wo contrast    by Fredy Fajardo DO (12/20 8975)      CT cervical spine without contrast   Final Result by Fredy Fajardo DO (12/20 5766)   1  Stable degenerative changes  No cervical spine fracture or traumatic malalignment  2   Possible bony metastatic disease involving the clivus and C3 vertebral body, stable  3   Worsening pulmonary metastatic disease  The major findings are in agreement with the preliminary report provided by [facility] which was provided shortly after completion of the exam  The additional finding of  worsening pulmonary metastatic disease,   will now be communicated with    patient's clinical team by our radiology liaison                     Workstation performed: [id number]                    Procedures  ECG 12 Lead Documentation  Date/Time: 12/20/2017 3:46 AM  Performed by: Chery Man  Authorized by: Severa Mari D     ECG reviewed by me, the ED Provider: yes    Patient location:  ED  Previous ECG:     Comparison to cardiac monitor: Yes ()    Interpretation:     Interpretation: normal    Rate:     ECG rate:  107    ECG rate assessment: tachycardic    Rhythm:     Rhythm: sinus tachycardia    Ectopy:     Ectopy: none    QRS:     QRS axis:  Normal    QRS intervals:  Normal  Conduction:     Conduction: normal    ST segments:     ST segments:  Normal  T waves:     T waves: normal               Phone Contacts  ED Phone Contact    ED Course  ED Course                                MDM  Number of Diagnoses or Management Options  Elevated liver function tests:   Fall:   Hyperglycemia:   Metastatic disease ([facility] ):   Spinal stenosis at L4-L5 level:   Thoracic back pain:   Vertebral compression fracture [facility]):   Diagnosis management comments: Discussed case with Dr Ramona Fuller and Dr sloan Fuller said he will come and evaluate later this morning to hold off on giving steroids at this time  CritCare Time    Disposition  Final diagnoses:   Hyperglycemia   Elevated liver function tests   Thoracic back pain   Fall   Metastatic disease ([address] ) - New bony lesions and worsening pulmonary disease   Vertebral compression fracture ([facility] ) - Pathologic   Spinal stenosis at L4-L5 level     Time reflects when diagnosis was documented in both MDM as applicable and the Disposition within this note     Time User Action Codes Description Comment    12/20/2017  3:48 AM Carlos Camargo Add [R73 9] Hyperglycemia     12/20/2017  5:16 AM Terressa SidCarlos guzman Add [R79 89] Elevated liver function tests     12/20/2017  5:16 AM Carlos Camargo Add [M54 6] Thoracic back pain     12/20/2017  5:16 AM Carlos Camargo Add [I37  XXXA] Fall     12/20/2017  5:51 AM Carlos Camargo Add [C79 9] Metastatic disease ([facility] [address] )     12/20/2017  7:03 AM Bee, Haze Lot Add [M48 50XA] Vertebral compression fracture ([facility] )     12/20/2017  7:04 AM Livan Lab, Haze Lot Modify [M48 50XA] Vertebral compression fracture [facility]) Pathologic    12/20/2017  7:04 AM Bee, Haze Lot Add [B28 292] Spinal stenosis at L4-L5 level     12/20/2017  7:04 AM Bee, Haze Lot Modify [C79 9] Metastatic disease [facility]) New bony lesions and worsening pulmonary disease      ED Disposition     ED Disposition Condition Comment    Admit  Jarrell Baltazar will be admitted to [facility] as an inpatient admission under Dr Venkat Cervantes consulted  Follow-up Information     Follow up With Specialties Details Why [address]  Schedule an appointment as soon as possible for a visit in 1 week  [id number] [facility]        Patient's Medications   Discharge Prescriptions    No medications on file     No discharge procedures on file      ED Provider  Electronically Signed by           Taylor Choi MD  12/20/17 7156

## 2017-12-21 LAB
ALBUMIN SERPL BCP-MCNC: 2.1 G/DL (ref 3.5–5)
ALP SERPL-CCNC: 414 U/L (ref 46–116)
ALT SERPL W P-5'-P-CCNC: 125 U/L (ref 12–78)
ANION GAP SERPL CALCULATED.3IONS-SCNC: 6 MMOL/L (ref 4–13)
AST SERPL W P-5'-P-CCNC: 57 U/L (ref 5–45)
BILIRUB SERPL-MCNC: 0.3 MG/DL (ref 0.2–1)
BUN SERPL-MCNC: 14 MG/DL (ref 5–25)
CALCIUM SERPL-MCNC: 8.7 MG/DL (ref 8.3–10.1)
CEA SERPL-MCNC: 29.8 NG/ML (ref 0–3)
CHLORIDE SERPL-SCNC: 100 MMOL/L (ref 100–108)
CO2 SERPL-SCNC: 29 MMOL/L (ref 21–32)
CREAT SERPL-MCNC: 0.54 MG/DL (ref 0.6–1.3)
ERYTHROCYTE [DISTWIDTH] IN BLOOD BY AUTOMATED COUNT: 14.9 % (ref 11.6–15.1)
GFR SERPL CREATININE-BSD FRML MDRD: 117 ML/MIN/1.73SQ M
GLUCOSE SERPL-MCNC: 130 MG/DL (ref 65–140)
GLUCOSE SERPL-MCNC: 179 MG/DL (ref 65–140)
GLUCOSE SERPL-MCNC: 196 MG/DL (ref 65–140)
GLUCOSE SERPL-MCNC: 286 MG/DL (ref 65–140)
GLUCOSE SERPL-MCNC: 50 MG/DL (ref 65–140)
GLUCOSE SERPL-MCNC: 66 MG/DL (ref 65–140)
HCT VFR BLD AUTO: 34.5 % (ref 42–52)
HGB BLD-MCNC: 11.2 G/DL (ref 14–18)
MAGNESIUM SERPL-MCNC: 1.3 MG/DL (ref 1.6–2.6)
MCH RBC QN AUTO: 29.2 PG (ref 27–31)
MCHC RBC AUTO-ENTMCNC: 32.6 G/DL (ref 31.4–37.4)
MCV RBC AUTO: 90 FL (ref 82–98)
MRSA NOSE QL CULT: NORMAL
PHOSPHATE SERPL-MCNC: 3.6 MG/DL (ref 2.7–4.5)
PLATELET # BLD AUTO: 312 THOUSANDS/UL (ref 130–400)
PMV BLD AUTO: 6.7 FL (ref 8.9–12.7)
POTASSIUM SERPL-SCNC: 3.6 MMOL/L (ref 3.5–5.3)
PROT SERPL-MCNC: 6.1 G/DL (ref 6.4–8.2)
RBC # BLD AUTO: 3.85 MILLION/UL (ref 4.7–6.1)
SODIUM SERPL-SCNC: 135 MMOL/L (ref 136–145)
WBC # BLD AUTO: 7.1 THOUSAND/UL (ref 4.8–10.8)

## 2017-12-21 PROCEDURE — 82948 REAGENT STRIP/BLOOD GLUCOSE: CPT

## 2017-12-21 PROCEDURE — 94660 CPAP INITIATION&MGMT: CPT

## 2017-12-21 PROCEDURE — 83735 ASSAY OF MAGNESIUM: CPT | Performed by: STUDENT IN AN ORGANIZED HEALTH CARE EDUCATION/TRAINING PROGRAM

## 2017-12-21 PROCEDURE — 82378 CARCINOEMBRYONIC ANTIGEN: CPT | Performed by: INTERNAL MEDICINE

## 2017-12-21 PROCEDURE — 94760 N-INVAS EAR/PLS OXIMETRY 1: CPT

## 2017-12-21 PROCEDURE — 85027 COMPLETE CBC AUTOMATED: CPT | Performed by: STUDENT IN AN ORGANIZED HEALTH CARE EDUCATION/TRAINING PROGRAM

## 2017-12-21 PROCEDURE — 84100 ASSAY OF PHOSPHORUS: CPT | Performed by: STUDENT IN AN ORGANIZED HEALTH CARE EDUCATION/TRAINING PROGRAM

## 2017-12-21 PROCEDURE — 80053 COMPREHEN METABOLIC PANEL: CPT | Performed by: STUDENT IN AN ORGANIZED HEALTH CARE EDUCATION/TRAINING PROGRAM

## 2017-12-21 RX ORDER — HYDROMORPHONE HCL 110MG/55ML
PATIENT CONTROLLED ANALGESIA SYRINGE INTRAVENOUS
Status: COMPLETED
Start: 2017-12-21 | End: 2017-12-21

## 2017-12-21 RX ORDER — POTASSIUM CHLORIDE 20 MEQ/1
40 TABLET, EXTENDED RELEASE ORAL ONCE
Status: COMPLETED | OUTPATIENT
Start: 2017-12-21 | End: 2017-12-21

## 2017-12-21 RX ADMIN — BACLOFEN 10 MG: 10 TABLET ORAL at 09:31

## 2017-12-21 RX ADMIN — HEPARIN SODIUM 5000 UNITS: 5000 INJECTION, SOLUTION INTRAVENOUS; SUBCUTANEOUS at 14:01

## 2017-12-21 RX ADMIN — SODIUM CHLORIDE 125 ML/HR: 0.9 INJECTION, SOLUTION INTRAVENOUS at 18:03

## 2017-12-21 RX ADMIN — HEPARIN SODIUM 5000 UNITS: 5000 INJECTION, SOLUTION INTRAVENOUS; SUBCUTANEOUS at 21:44

## 2017-12-21 RX ADMIN — HYDROMORPHONE HYDROCHLORIDE 2 MG: 2 INJECTION, SOLUTION INTRAMUSCULAR; INTRAVENOUS; SUBCUTANEOUS at 18:03

## 2017-12-21 RX ADMIN — HYDROMORPHONE HYDROCHLORIDE 2 MG: 2 INJECTION, SOLUTION INTRAMUSCULAR; INTRAVENOUS; SUBCUTANEOUS at 00:53

## 2017-12-21 RX ADMIN — PAROXETINE HYDROCHLORIDE 30 MG: 20 TABLET, FILM COATED ORAL at 21:41

## 2017-12-21 RX ADMIN — GLIPIZIDE 10 MG: 5 TABLET ORAL at 18:01

## 2017-12-21 RX ADMIN — SODIUM CHLORIDE 125 ML/HR: 0.9 INJECTION, SOLUTION INTRAVENOUS at 09:44

## 2017-12-21 RX ADMIN — TAMSULOSIN HYDROCHLORIDE 0.4 MG: 0.4 CAPSULE ORAL at 09:31

## 2017-12-21 RX ADMIN — POTASSIUM CHLORIDE 40 MEQ: 1500 TABLET, EXTENDED RELEASE ORAL at 11:44

## 2017-12-21 RX ADMIN — HYDROMORPHONE HYDROCHLORIDE 2 MG: 2 INJECTION, SOLUTION INTRAMUSCULAR; INTRAVENOUS; SUBCUTANEOUS at 09:30

## 2017-12-21 RX ADMIN — PRAVASTATIN SODIUM 40 MG: 40 TABLET ORAL at 18:01

## 2017-12-21 RX ADMIN — CLOPIDOGREL BISULFATE 75 MG: 75 TABLET ORAL at 09:31

## 2017-12-21 RX ADMIN — GLIPIZIDE 10 MG: 5 TABLET ORAL at 06:01

## 2017-12-21 RX ADMIN — HYDROMORPHONE HYDROCHLORIDE 2 MG: 2 INJECTION, SOLUTION INTRAMUSCULAR; INTRAVENOUS; SUBCUTANEOUS at 14:02

## 2017-12-21 RX ADMIN — HYDROMORPHONE HYDROCHLORIDE 2 MG: 2 INJECTION, SOLUTION INTRAMUSCULAR; INTRAVENOUS; SUBCUTANEOUS at 04:53

## 2017-12-21 RX ADMIN — INSULIN DETEMIR 40 UNITS: 100 INJECTION, SOLUTION SUBCUTANEOUS at 21:49

## 2017-12-21 RX ADMIN — HYDROMORPHONE HYDROCHLORIDE 2 MG: 2 INJECTION, SOLUTION INTRAMUSCULAR; INTRAVENOUS; SUBCUTANEOUS at 22:33

## 2017-12-21 RX ADMIN — TIOTROPIUM BROMIDE 18 MCG: 18 CAPSULE ORAL; RESPIRATORY (INHALATION) at 09:45

## 2017-12-21 RX ADMIN — HEPARIN SODIUM 5000 UNITS: 5000 INJECTION, SOLUTION INTRAVENOUS; SUBCUTANEOUS at 06:01

## 2017-12-21 RX ADMIN — INSULIN LISPRO 1 UNITS: 100 INJECTION, SOLUTION INTRAVENOUS; SUBCUTANEOUS at 18:01

## 2017-12-21 RX ADMIN — FAMOTIDINE 20 MG: 20 TABLET, FILM COATED ORAL at 09:31

## 2017-12-21 RX ADMIN — NICOTINE 21 MG: 21 PATCH, EXTENDED RELEASE TRANSDERMAL at 09:30

## 2017-12-21 RX ADMIN — INSULIN LISPRO 2 UNITS: 100 INJECTION, SOLUTION INTRAVENOUS; SUBCUTANEOUS at 11:44

## 2017-12-21 RX ADMIN — BACLOFEN 10 MG: 10 TABLET ORAL at 21:44

## 2017-12-21 NOTE — PROGRESS NOTES
[address] [facility] 66 [facility] 83 [facility] Practice Progress Note - Jarrell Baltazar 65 y o  male MRN: [id number]  Unit/Bed#: 2 [place] [address] B Encounter: [id number]      Assessment/Plan:    Fall:  · Likely 2/2 Mechanical vs  Cardiogenic vs  Neurogenic  · Baseline as per patient:    · Difficulty with ambulation; uses a wheelchair and cane; reports difficulty with balance  · Monitor with Telemetry  · Neurochecks q4h     Leg Pain:  · Likely 2/2 Metastatic Disease vs  Spinal Stenosis vs  Cauda Equina Syndrome  · MRI (Thoracic/Lumbar): · Extensive diffuse metastatic disease throughout the lower thoracic spine, lumbar spine and imaged portions of the sacrum and bilateral tacho  · Metastatic disease at L5 extends into the central canal and likely impinges multiple left-sided nerve roots  , Critical central stenosis is not present    · CT (Thoracic/Lumbar) Spine on admission:  · Metastatic Disease of the L5 vertebral body  · Acute, nondisplaced fracture posterior aspect of the 9th rib, likely to pathologic bone lesion  · Acute mildly displaced fracture through pathologic bone lesion involving the posterior medial aspect of the right 11th rib  · Significant worsening of Bony Metastatic Disease  · Worsening Pulmonary Metastatic Disease  · CT (Cervical Spine) on admission:  · Stable degenerative changes, no cervical spine fracture or traumatic malalignment  · Possible bony Metastatic Disease involving the clivus and T3 vertebral body, stable  · Worsening pulmonary metastatic disease  · CK-MB on admission: 0 6  · Total Ck: 204  · Pain Control: Dilaudid 4mg IV q6h  · Consult to Orthopedic Surgery:  No concern for cauda equina syndrome  · Consult to Hematology/Oncology:  Awaiting recommendation     DEBBIE - Resolved:  · Likely 2/2 Multifactorial (Dehydration and Rhabdomyolysis)  · Baseline Cr: 0 74-0 95  · Current Cr: 0 54  · Continue IV NS 125cc     Leukocytosis - Resolved:  · 2/2 Reactive vs  Infectious  · WBC on admission: 11 6  · Current WBC: 7 1  · Will monitor with daily CBC     Hyponatremia:  · Likely 2/2 Volume Depletion  · Na on admission: 132  · Current Na: 135  · Continue IV NS 125cc/hr  · Will monitor with daily BMP      Hypotension - Resolved:  · Likely 2/2 Dehydration  · Current BP:  129/62  · Restart Home Medications: Lisinopril and Metoprolol  · Monitor with daily vitals     Hx of Metastatic Colon Cancer:  · Metastasis to Lung and Liver and Bone  · Consult Hematology/Oncology:  Awaiting recommendation     Hyperlipidemia:  · Stable; continue home medication:  Lovastatin     CAD:  · s/p PCIx2; Cardiac Cath on 1/13/116: No evidence of CAD  · Stable; continue on home medication: ASA 81 and Plavix 75mg     COPD:  · Stable; continue on home medication:  Spiriva and ProAir     CARLOS:  · Stable; continue with CPAP     BPH:  · Stable; continue on home medication: Flomax     Depression/Anxiety:  · Stable; continue on home medication: Paxil     Tobacco Dependence:  · Continue Nicoderm 21mg patch  · Counseled on smoking cessation     FEN:  · Cardiac Diet + NS 125cc/hr / Replete Electrolytes as Needed / Heparin + SCDs    Subjective:   Patient seen and examined at bedside  Notes no new complaints overnight  Notes improvement of pain  Has been eating with no trouble  Notes overall improvement  Pain is well controlled  Objective:     Vitals: Blood pressure 114/70, pulse 90, temperature 98 9 °F (37 2 °C), temperature source Oral, resp  rate 20, height 5' 6" (1 676 m), weight 90 5 kg (199 lb 8 3 oz), SpO2 95 %  ,Body mass index is 32 2 kg/m²    Wt Readings from Last 3 Encounters:   12/20/17 90 5 kg (199 lb 8 3 oz)   12/20/17 90 3 kg (199 lb)   12/04/17 92 5 kg (204 lb)       Intake/Output Summary (Last 24 hours) at 12/21/17 5816  Last data filed at 12/20/17 1137   Gross per 24 hour   Intake             2000 ml   Output              300 ml   Net             1700 ml       Physical Exam:   General appearance: alert, cooperative and no distress  Lungs: clear to auscultation bilaterally  Heart: regular rate and rhythm, S1, S2 normal, no murmur, click, rub or gallop  Abdomen: soft, non-tender; bowel sounds normal; no masses,  no organomegaly  Extremities: Ambulation is Limited  Pulses: 2+ and symmetric  Neurologic: CN 2-12; Left LE: 1/5 Motor w  Intact Sensation, Right LE: 3/5 Motor w   Decreased Sensation; Limited ROM    Recent Results (from the past 24 hour(s))   Lactic acid, plasma    Collection Time: 12/20/17  8:48 AM   Result Value Ref Range    LACTIC ACID 2 0 0 5 - 2 0 mmol/L   UA w Reflex to Microscopic w Reflex to Culture    Collection Time: 12/20/17 10:21 AM   Result Value Ref Range    Color, UA Brea     Clarity, UA Slightly Cloudy     Specific Arabi, UA 1 015 1 000 - 1 030    pH, UA 5 5 5 0 - 9 0    Leukocytes, UA Negative Negative    Nitrite, UA Negative Negative    Protein, UA 30 (1+) (A) Negative mg/dl    Glucose,  (1/10%) (A) Negative mg/dl    Ketones, UA Trace (A) Negative mg/dl    Urobilinogen, UA 1 0 0 2, 1 0 E U /dl E U /dl    Bilirubin, UA Interference- unable to analyze (A) Negative    Blood, UA Negative Negative   Urine Microscopic    Collection Time: 12/20/17 10:21 AM   Result Value Ref Range    RBC, UA 0-1 (A) None Seen, 0-5 /hpf    WBC, UA 4-10 (A) None Seen, 0-5, 5-55, 5-65 /hpf    Epithelial Cells Occasional None Seen, Occasional /hpf    Bacteria, UA None Seen None Seen, Occasional /hpf    Hyaline Casts, UA 10-20 (A) (none) /lpf    Fine granular casts 1-2 /lpf    COARSE GRANULAR CASTS 0-1 /lpf    OTHER OBSERVATIONS Sperm Present     MUCOUS THREADS Innumerable Occasional, Moderate, Innumerable   Fingerstick Glucose (POCT)    Collection Time: 12/20/17 11:11 AM   Result Value Ref Range    POC Glucose 62 (L) 65 - 140 mg/dl   Platelet count    Collection Time: 12/20/17  1:47 PM   Result Value Ref Range    Platelets 337 903 - 954 Thousands/uL    MPV 6 7 (L) 8 9 - 12 7 fL   Fingerstick Glucose (POCT)    Collection Time: 12/20/17  4:09 PM   Result Value Ref Range    POC Glucose 171 (H) 65 - 140 mg/dl   Fingerstick Glucose (POCT)    Collection Time: 12/20/17  9:41 PM   Result Value Ref Range    POC Glucose 186 (H) 65 - 140 mg/dl   CBC (With Platelets)    Collection Time: 12/21/17  5:24 AM   Result Value Ref Range    WBC 7 10 4 80 - 10 80 Thousand/uL    RBC 3 85 (L) 4 70 - 6 10 Million/uL    Hemoglobin 11 2 (L) 14 0 - 18 0 g/dL    Hematocrit 34 5 (L) 42 0 - 52 0 %    MCV 90 82 - 98 fL    MCH 29 2 27 0 - 31 0 pg    MCHC 32 6 31 4 - 37 4 g/dL    RDW 14 9 11 6 - 15 1 %    Platelets 669 161 - 678 Thousands/uL    MPV 6 7 (L) 8 9 - 12 7 fL       Current Facility-Administered Medications   Medication Dose Route Frequency Provider Last Rate Last Dose    acetaminophen (TYLENOL) tablet 650 mg  650 mg Oral Q6H PRN Samantha Justice MD        albuterol (PROVENTIL HFA,VENTOLIN HFA) inhaler 1 puff  1 puff Inhalation Q6H PRN Samantha Justice MD        baclofen tablet 10 mg  10 mg Oral BID Samantha Justice MD   10 mg at 12/20/17 2029    clopidogrel (PLAVIX) tablet 75 mg  75 mg Oral Daily Samantha Justice MD   75 mg at 12/20/17 1340    famotidine (PEPCID) tablet 20 mg  20 mg Oral Daily Samantha Justice MD        glipiZIDE (GLUCOTROL) tablet 10 mg  10 mg Oral BID AC Samantha Justice MD   10 mg at 12/21/17 0601    heparin (porcine) subcutaneous injection 5,000 Units  5,000 Units Subcutaneous [facility] Samantha Justice MD   5,000 Units at 12/21/17 0601    HYDROmorphone (DILAUDID) 2 mg/mL injection 2 mg  2 mg Intravenous Q4H PRN Samantha Justice MD   2 mg at 12/21/17 0453    insulin detemir (LEVEMIR) subcutaneous injection 40 Units  40 Units Subcutaneous HS Samantha Justice MD   40 Units at 12/20/17 2206    insulin lispro (HumaLOG) 100 units/mL subcutaneous injection 1-6 Units  1-6 Units Subcutaneous TID [facility] Samantha Justice MD   1 Units at 12/20/17 1638    nicotine (NICODERM CQ) 21 mg/24 hr TD 24 hr patch 21 mg  21 mg Transdermal Daily Samantha Justice MD   21 mg at 12/20/17 1341    PARoxetine (PAXIL) tablet 30 mg  30 mg Oral HS Christopher Guidry MD   30 mg at 12/20/17 2204    pravastatin (PRAVACHOL) tablet 40 mg  40 mg Oral Daily With Iraida Sanchez MD   40 mg at 12/20/17 1639    sodium chloride 0 9 % infusion  125 mL/hr Intravenous Continuous Naffie MD Mona 125 mL/hr at 12/20/17 1137 125 mL/hr at 12/20/17 1137    tamsulosin (FLOMAX) capsule 0 4 mg  0 4 mg Oral QAM Christopher Guidry MD   0 4 mg at 12/20/17 1340    tiotropium (SPIRIVA) capsule for inhaler 18 mcg  18 mcg Inhalation Daily Christopher Guidry MD         Facility-Administered Medications Ordered in Other Encounters   Medication Dose Route Frequency Provider Last Rate Last Dose    alteplase (CATHFLO) injection 2 mg  2 mg Intracatheter Once PRN Koby Quiroga MD        atropine injection 0 25 mg  0 25 mg Intravenous Once Koby Quiroga MD        fluorouracil (ADRUCIL) injection 808 mg  808 mg Intravenous Once Koby Quiroga MD        irinotecan (CAMPTOSAR) 380 mg in sodium chloride 0 9 % 500 mL chemo infusion  380 mg Intravenous Once Koby Quiroga MD        leucovorin 800 mg in sodium chloride 0 9 % 250 mL IVPB  800 mg Intravenous Once Koby Quiroga MD        ondansetron [facility]) 16 mg, dexamethasone (DECADRON) 10 mg in sodium chloride 0 9 % 50 mL IVPB   Intravenous Once Koby Quiroga MD        sodium chloride 0 9 % infusion  20 mL/hr Intravenous Once Koby Quiroga MD           Invasive Devices     Peripheral Intravenous Line            Peripheral IV 12/20/17 Left Arm 1 day                Lab, Imaging and other studies: I have personally reviewed pertinent reports      VTE Pharmacologic Prophylaxis: Heparin  VTE Mechanical Prophylaxis: sequential compression device          Christopher Guidry MD

## 2017-12-21 NOTE — PROGRESS NOTES
Family Medicine Continuity Progress Note    Radhames Johnson is a 78-year-old  male  With past medical history spinal stenosis, metastatic disease of the colon, hypertension, hyperlipidemia, COPD, CARLOS with CPAP, CAD, BPH, depression with anxiety who presented to the ER with a complaint of lower extremity pain status post fall  Patient reports is trying to get into his wheelchair when he slipped and fell on his back  Patient reports he had some dizziness, lightheadedness and some diaphoresis and briefly lost consciousness  The fall was unwitnessed  Patient denies any seizure-like activity, loss of bowel or urine function, auras, visual changes, chest pain, palpitation, shortness of breath  Patient is currently receiving chemotherapy as per his oncologist for an ascending colon mass  Patient denies any loss of sensation or numbness or tingling  Patient does report increasing weakness in his bilateral lower extremities over the last six       Vitals:    12/21/17 0845   BP: 129/78   Pulse: (!) 108   Resp: 20   Temp: 98 9 °F (37 2 °C)   SpO2: 96%     GEN:  No acute distress,  Mildly cachectic, muscular atrophy noted  CVS: Normal rate, regular rhythm, normal heart sounds  Respiratory: Clear to auscultation bilaterally  MUS:  Exam limited secondary to pain:  Tenderness to palpation of spinal cord, no step-off noted;  Bilateral upper extremities shows motor strength of 4/5; left lower extremity shows motor strength of 1/5 in right lower extremity shows motor strength of 3/5, decrease sensation in the lower extremities,  Limited ROM       This is a 78-year-old male with multiple comorbidities who is admitted for lower extremity status post fall      #L5 compression fracture status post fall as seen on CT imaging: orthopedics being consulted  No signs or symptoms of cauda equina syndrome  Patient is to follow up with PT/OT, continue with pain control and wear a back brace    Continue to monitor for any neurovascular changes    #  Metastatic disease from ascending colon mass:  Hematology/Oncology consulted whose recommendations are appreciated, patient currently in chemotherapy however noncompliant with regimen    All other primary medical conditions as managed by primary medical team   Elaine Bauer

## 2017-12-21 NOTE — PLAN OF CARE
CARDIOVASCULAR - ADULT     Maintains optimal cardiac output and hemodynamic stability Progressing     Absence of cardiac dysrhythmias or at baseline rhythm Progressing        DISCHARGE PLANNING - CARE MANAGEMENT     Discharge to post-acute care or home with appropriate resources Progressing        HEMATOLOGIC - ADULT     Maintains hematologic stability Progressing        METABOLIC, FLUID AND ELECTROLYTES - ADULT     Electrolytes maintained within normal limits Progressing     Fluid balance maintained Progressing     Glucose maintained within target range Progressing        MUSCULOSKELETAL - ADULT     Maintain or return mobility to safest level of function Progressing     Maintain proper alignment of affected body part Progressing        Potential for Falls     Patient will remain free of falls Progressing        Prexisting or High Potential for Compromised Skin Integrity     Skin integrity is maintained or improved Progressing        RESPIRATORY - ADULT     Achieves optimal ventilation and oxygenation Progressing        SKIN/TISSUE INTEGRITY - ADULT     Skin integrity remains intact Progressing     Incision(s), wounds(s) or drain site(s) healing without S/S of infection Progressing     Oral mucous membranes remain intact Progressing

## 2017-12-21 NOTE — PLAN OF CARE
Problem: RESPIRATORY - ADULT  Goal: Achieves optimal ventilation and oxygenation  INTERVENTIONS:  - Assess for changes in respiratory status  - Assess for changes in mentation and behavior  - Position to facilitate oxygenation and minimize respiratory effort  - Oxygen administration by appropriate delivery method based on oxygen saturation (per order) or ABGs  - Initiate smoking cessation education as indicated  - Encourage broncho-pulmonary hygiene including cough, deep breathe, Incentive Spirometry  - Assess the need for suctioning and aspirate as needed  - Assess and instruct to report SOB or any respiratory difficulty  - Respiratory Therapy support as indicated   -CPAP +16 HS for CARLOS   Outcome: Progressing

## 2017-12-21 NOTE — CASE MANAGEMENT
Initial Clinical Review    Admission: Date/Time/Statement: 12/20/17 @ 0708     Orders Placed This Encounter   Procedures    Inpatient Admission (expected length of stay for this patient is greater than two midnights)     Standing Status:   Standing     Number of Occurrences:   1     Order Specific Question:   Admitting Physician     Answer:   Lc Garcia     Order Specific Question:   Level of Care     Answer:   Med Surg [16]     Order Specific Question:   Estimated length of stay     Answer:   More than 2 Midnights     Order Specific Question:   Certification     Answer:   I certify that inpatient services are medically necessary for this patient for a duration of greater than two midnights  See H&P and MD Progress Notes for additional information about the patient's course of treatment  ED: Date/Time/Mode of Arrival:   ED Arrival Information     Expected Arrival Acuity Means of Arrival Escorted By Service Admission Type    - 12/20/2017 02:55 Urgent Ambulance [address] [facility]    Arrival Complaint    fall/ leg pain      Chief Complaint:   Chief Complaint   Patient presents with    Fall     fell out of wheelchair at 12:30 this morning when trying to transfer onto toilet, laid on floor for 2 1/2 hours   History of Illness:   55-year-old  male with metastatic lung CA fell out of his wheelchair tonight when trying to transfer to the toilet  Patient states he laid on the floor for several hours before somebody came to help him  Patient has a history of spinal stenosis  Patient complains of mid to upper back pain  No obvious deformity, no fever, no chest pain, no shortness of breath    ED Vital Signs:   ED Triage Vitals   Temperature Pulse Respirations Blood Pressure SpO2   12/20/17 0259 12/20/17 0259 12/20/17 0259 12/20/17 0302 12/20/17 0259   98 5 °F (36 9 °C) (!) 113 22 (!) 193/52 97 %      Temp Source Heart Rate Source Patient Position - Orthostatic VS BP Location FiO2 (%) 12/20/17 0259 12/20/17 0259 12/20/17 0259 12/20/17 0259 --   Oral Monitor Lying Left arm       Pain Score       12/20/17 0259       Worst Possible Pain        Wt Readings from Last 1 Encounters:   12/20/17 90 5 kg (199 lb 8 3 oz)   Vital Signs (abnormal):   , 96  T 96  Abnormal Labs/Diagnostic Test Results:   WBC 11 60 HGB 12 7   CL 94 CR 1 44 GLUC 265 ast 87 alt 180 alk phos 543 alb 2 4 GFR 54 PT 12 4 INR 1 18   Color, UA  Brea     Clarity, UA   Slightly Cloudy     Specific Gravity, UA 1 000 - 1 030 1 015     pH, UA 5 0 - 9 0 5 5     Leukocytes, UA Negative  Negative     Nitrite, UA Negative  Negative     Protein, UA Negative mg/dl 30 (1+)   A    Glucose, UA Negative mg/dl  100 (1/10%)   A    Ketones, UA Negative mg/dl Trace   A    Urobilinogen, UA 0 2, 1 0 E U /dl E U /dl 1 0     Bilirubin, UA Negative  Interference- unable to analyze   A    Comments: The dipstick result may be falsely positive do to interfering substances  We recommend reliance upon serum bilirubin, liver & kidney function tests to guide patient care if clinically indicated  Blood, UA Negative  Negative       RBC, UA None Seen, 0-5 /hpf 0-1   A    WBC, UA None Seen, 0-5, 5-55, 5-65 /hpf 4-10   A    Epithelial Cells None Seen, Occasional /hpf  Occasional     Bacteria, UA None Seen, Occasional /hpf  None Seen     Hyaline Casts, UA (none) /lpf 10-20   A    Fine granular casts /lpf 1-2     COARSE GRANULAR CASTS /lpf 0-1     OTHER OBSERVATIONS   Sperm Present     MUCOUS THREADS Occasional, Moderate, Innumerable  Innumerable       CT CERVICAL SPINE=1  Stable degenerative changes  No cervical spine fracture or traumatic malalignment  2   Possible bony metastatic disease involving the clivus and C3 vertebral body, stable  3   Worsening pulmonary metastatic disease  CT THORACIC SPINE= 1   Metastatic disease of the L5 vertebral body  There is compression deformity of the posterior, superior endplate with bony retropulsion  This results in moderate central stenosis and severe bilateral lateral recess narrowing  The findings are   suspicious for acute, pathologic compression fracture  2   Acute, nondisplaced fracture posterior aspect of the right 9th rib, likely through pathologic bone lesion  No evidence of pneumothorax  3   Acute, mildly displaced fracture through pathologic bone lesion involving the posterior medial aspect of the right 11th rib  No evidence of pneumothorax  4   Significant worsening of bony metastatic disease  5   Worsening pulmonary metastatic disease  MRI CERVICAL SPINE=Multiple metastatic lesions of the clivus, cervical spine and thoracic spine with marrow edema most prominent in the left side of the C5 vertebral body  MRI LUMBAR SPINE= Extensive diffuse metastatic disease throughout the lower thoracic spine, lumbar spine and imaged portions of the sacrum and bilateral tacho  Metastatic disease at L5 extends into the central canal and likely impinges multiple left-sided nerve roots  Critical central stenosis is not present    ED Treatment:   Medication Administration from 12/20/2017 0255 to 12/20/2017 1009       Date/Time Order Dose Route Action Action by Comments     12/20/2017 0332 morphine (PF) 4 mg/mL injection 4 mg 4 mg Intravenous Given Darren Salgado RN      12/20/2017 0500 HYDROmorphone (DILAUDID) 1 mg/mL injection 1 mg 1 mg Intravenous Given Darren Salgado RN      12/20/2017 0901 sodium chloride 0 9 % bolus 1,000 mL 0 mL Intravenous Stopped Edwina Lee RN      12/20/2017 0800 sodium chloride 0 9 % bolus 1,000 mL 1,000 mL Intravenous New Bag Edwina Lee RN      12/20/2017 0802 HYDROmorphone (DILAUDID) 1 mg/mL injection 1 mg 1 mg Intravenous Given Edwina Lee RN      12/20/2017 0916 sodium chloride 0 9 % infusion 0 mL/hr Intravenous Stopped Edwina Lee RN      12/20/2017 0912 sodium chloride 0 9 % infusion 150 mL/hr Intravenous New Bag Edwina Lee, ISHMAEL 12/20/2017 0921 sodium chloride 0 9 % bolus 1,000 mL 1,000 mL Intravenous New Bag Edwina Lee RN      12/20/2017 7713 HYDROmorphone (DILAUDID) 1 mg/mL injection 1 mg 1 mg Intravenous Given Elizabeth Roach RN       Past Medical/Surgical History: Active Ambulatory Problems     Diagnosis Date Noted    Hypertension     Hyperlipidemia     Diabetes mellitus ([place] [address] )     Cardiac disease     CAD (coronary artery disease)     COPD (chronic obstructive pulmonary disease) ([place] [address] )     CARLOS (obstructive sleep apnea)     Depression     Anxiety and depression 05/18/2017    BPH (benign prostatic hyperplasia) 05/18/2017    Spinal stenosis 05/18/2017    GERD (gastroesophageal reflux disease) 05/18/2017    Tobacco use 05/18/2017    Multiple lung nodules on CT 05/18/2017    RUQ pain 05/18/2017    Obstructive sleep apnea on CPAP     Colon cancer ([place] [address] ) 06/06/2017    Sinus tachycardia 10/30/2017    Generalized weakness 10/30/2017    Chest wall pain 10/30/2017    Weight loss 10/30/2017    Acute pain 10/30/2017     Resolved Ambulatory Problems     Diagnosis Date Noted    Pneumonia 03/08/2016    Sepsis ([place] [address] ) 03/08/2016    Chest pain 05/18/2017     Past Medical History:   Diagnosis Date    Anxiety     CAD (coronary artery disease)     Cancer ([place] [address] )     Cardiac disease     Chest pain     COPD (chronic obstructive pulmonary disease) (HCC)     CPAP (continuous positive airway pressure) dependence     Diabetes mellitus ([place] [address] )     Hyperlipidemia     Hypertension     Myocardial infarction 2008    Obstructive sleep apnea on CPAP     Spinal stenosis     Stroke [facility]) 2008   Admitting Diagnosis: Leg pain [M79 606]  Fall [W19  XXXA]  Hyperglycemia [R73 9]  Elevated liver function tests [R79 89]  Metastatic disease (HCC) [C79 9]  Vertebral compression fracture (HCC) [M48 50XA]  Thoracic back pain [M54 6]  Spinal stenosis at L4-L5 level [M48 061]  Age/Sex: 65 y o  male  Assessment/Plan:   Fall:  · Likely 2/2 Mechanical vs  Cardiogenic vs  Neurogenic  · Baseline as per patient:    ? Difficulty with ambulation; uses a wheelchair and cane; reports difficulty with balance  · Monitor with Telemetry  · Neurochecks q4h     Leg Pain:  · Likely 2/2 Metastatic Disease vs  Spinal Stenosis vs  Cauda Equina Syndrome  · Obtain MRI (Thoracic/Lumbar) STAT  · CT (Thoracic/Lumbar) Spine on admission:  ? Metastatic Disease of the L5 vertebral body  ? Acute, nondisplaced fracture posterior aspect of the 9th rib, likely to pathologic bone lesion  ? Acute mildly displaced fracture through pathologic bone lesion involving the posterior medial aspect of the right 11th rib  ? Significant worsening of Bony Metastatic Disease  ? Worsening Pulmonary Metastatic Disease  · CT (Cervical Spine) on admission:  ? Stable degenerative changes, no cervical spine fracture or traumatic malalignment  ? Possible bony Metastatic Disease involving the clivus and T3 vertebral body, stable  ? Worsening pulmonary metastatic disease  · CK-MB on admission: 0 6  · Total Ck: 204  · Pain Control: Dilaudid 4mg IV q6h  · Consult to Orthopedic Surgery  · Consult to Hematology/Oncology     DEBBIE:  · Likely 2/2 Multifactorial (dehydration and Rhabdomyolysis)  · Baseline Cr: 0 74-0 95  · Current Cr: 1 44  · Start IV NS 125cc     Leukocytosis:  · 2/2 Reactive vs  Infectious  · WBC on admission: 11 6  · Obtain CXR, UA, Blood Cx x2, LA  · Will monitor with daily CBC     Hyponatremia:  · Likely 2/2 Volume Depletion  · Na on admission: 132  · Start IV NS 125cc/hr  · Will monitor with daily BMP     Hypotension:  · Likely 2/2 Dehydration  · Current BP:  88/50  · 1L bolus NS given  · Will repeat BP and start IV NS 125cc/hr  · Will hold home medication due to Low Pressures: Lisinopril and Metoprolol  ?  Restart when Pressure has stabilized  · Monitor with daily vitals     Hx of Metastatic Colon Cancer:  · Metastasis to Lung and Liver and Bone  · Consult Hematology/Oncology     Hyperlipidemia:  · Stable; continue home medication:  Lovastatin     CAD:  · s/p PCIx2; Cardiac Cath on 1/13/116: No evidence of CAD  · Stable; continue on home medication: ASA 81 and Plavix 75mg     COPD:  · Stable; continue on home medication:  Spiriva and ProAir     CARLOS:  · Stable; continue with CPAP     BPH:  · Stable; continue on home medication: Flomax     Depression/Anxiety:  · Stable; continue on home medication: Paxil     Tobacco Dependence:  · Start Nicoderm 21mg patch  · Counseled on smoking cessation     FEN:  · Cardiac Diet + NS 125cc/hr / Replete Electrolytes as Needed / Heparin + SCDs  Admission Orders:  TELEMETRY  CONSULT ONCOLOGY  BLE VENODYNES  ACCUCHECKS WITH COVERAGE SCALE  SPINE BRACE  Scheduled Meds:   baclofen 10 mg Oral BID   clopidogrel 75 mg Oral Daily   famotidine 20 mg Oral Daily   glipiZIDE 10 mg Oral BID AC   heparin (porcine) 5,000 Units Subcutaneous Q8H [address]   insulin detemir 40 Units Subcutaneous HS   insulin lispro 1-6 Units Subcutaneous TID AC   nicotine 21 mg Transdermal Daily   PARoxetine 30 mg Oral HS   pravastatin 40 mg Oral Daily With Dinner   tamsulosin 0 4 mg Oral QAM   tiotropium 18 mcg Inhalation Daily     Continuous Infusions:   sodium chloride 125 mL/hr Last Rate: 125 mL/hr (12/21/17 9905)     PRN Meds:   acetaminophen    albuterol    HYDROmorphone; USED X5/24HRS

## 2017-12-22 LAB
ALBUMIN SERPL BCP-MCNC: 2.1 G/DL (ref 3.5–5)
ALP SERPL-CCNC: 370 U/L (ref 46–116)
ALT SERPL W P-5'-P-CCNC: 99 U/L (ref 12–78)
ANION GAP SERPL CALCULATED.3IONS-SCNC: 8 MMOL/L (ref 4–13)
AST SERPL W P-5'-P-CCNC: 50 U/L (ref 5–45)
BILIRUB SERPL-MCNC: 0.2 MG/DL (ref 0.2–1)
BUN SERPL-MCNC: 8 MG/DL (ref 5–25)
CALCIUM SERPL-MCNC: 8.7 MG/DL (ref 8.3–10.1)
CHLORIDE SERPL-SCNC: 104 MMOL/L (ref 100–108)
CO2 SERPL-SCNC: 28 MMOL/L (ref 21–32)
CREAT SERPL-MCNC: 0.47 MG/DL (ref 0.6–1.3)
ERYTHROCYTE [DISTWIDTH] IN BLOOD BY AUTOMATED COUNT: 14.5 % (ref 11.6–15.1)
GFR SERPL CREATININE-BSD FRML MDRD: 124 ML/MIN/1.73SQ M
GLUCOSE SERPL-MCNC: 108 MG/DL (ref 65–140)
GLUCOSE SERPL-MCNC: 140 MG/DL (ref 65–140)
GLUCOSE SERPL-MCNC: 42 MG/DL (ref 65–140)
GLUCOSE SERPL-MCNC: 44 MG/DL (ref 65–140)
GLUCOSE SERPL-MCNC: 63 MG/DL (ref 65–140)
GLUCOSE SERPL-MCNC: 67 MG/DL (ref 65–140)
GLUCOSE SERPL-MCNC: 86 MG/DL (ref 65–140)
GLUCOSE SERPL-MCNC: 87 MG/DL (ref 65–140)
HCT VFR BLD AUTO: 32.1 % (ref 42–52)
HGB BLD-MCNC: 10.7 G/DL (ref 14–18)
MAGNESIUM SERPL-MCNC: 1.1 MG/DL (ref 1.6–2.6)
MCH RBC QN AUTO: 29.5 PG (ref 27–31)
MCHC RBC AUTO-ENTMCNC: 33.4 G/DL (ref 31.4–37.4)
MCV RBC AUTO: 88 FL (ref 82–98)
PHOSPHATE SERPL-MCNC: 3.2 MG/DL (ref 2.7–4.5)
PLATELET # BLD AUTO: 293 THOUSANDS/UL (ref 130–400)
PMV BLD AUTO: 6.9 FL (ref 8.9–12.7)
POTASSIUM SERPL-SCNC: 4.2 MMOL/L (ref 3.5–5.3)
PROT SERPL-MCNC: 6 G/DL (ref 6.4–8.2)
RBC # BLD AUTO: 3.64 MILLION/UL (ref 4.7–6.1)
SODIUM SERPL-SCNC: 140 MMOL/L (ref 136–145)
WBC # BLD AUTO: 7.4 THOUSAND/UL (ref 4.8–10.8)

## 2017-12-22 PROCEDURE — 97167 OT EVAL HIGH COMPLEX 60 MIN: CPT

## 2017-12-22 PROCEDURE — 82948 REAGENT STRIP/BLOOD GLUCOSE: CPT

## 2017-12-22 PROCEDURE — 83735 ASSAY OF MAGNESIUM: CPT | Performed by: STUDENT IN AN ORGANIZED HEALTH CARE EDUCATION/TRAINING PROGRAM

## 2017-12-22 PROCEDURE — G8987 SELF CARE CURRENT STATUS: HCPCS

## 2017-12-22 PROCEDURE — 80053 COMPREHEN METABOLIC PANEL: CPT | Performed by: STUDENT IN AN ORGANIZED HEALTH CARE EDUCATION/TRAINING PROGRAM

## 2017-12-22 PROCEDURE — 94760 N-INVAS EAR/PLS OXIMETRY 1: CPT

## 2017-12-22 PROCEDURE — G8988 SELF CARE GOAL STATUS: HCPCS

## 2017-12-22 PROCEDURE — 94660 CPAP INITIATION&MGMT: CPT

## 2017-12-22 PROCEDURE — 85027 COMPLETE CBC AUTOMATED: CPT | Performed by: STUDENT IN AN ORGANIZED HEALTH CARE EDUCATION/TRAINING PROGRAM

## 2017-12-22 PROCEDURE — G8979 MOBILITY GOAL STATUS: HCPCS

## 2017-12-22 PROCEDURE — 84100 ASSAY OF PHOSPHORUS: CPT | Performed by: STUDENT IN AN ORGANIZED HEALTH CARE EDUCATION/TRAINING PROGRAM

## 2017-12-22 PROCEDURE — 97163 PT EVAL HIGH COMPLEX 45 MIN: CPT

## 2017-12-22 PROCEDURE — G8978 MOBILITY CURRENT STATUS: HCPCS

## 2017-12-22 RX ORDER — DOCUSATE SODIUM 100 MG/1
100 CAPSULE, LIQUID FILLED ORAL 2 TIMES DAILY
Status: DISCONTINUED | OUTPATIENT
Start: 2017-12-22 | End: 2017-12-23 | Stop reason: HOSPADM

## 2017-12-22 RX ORDER — HYDROMORPHONE HCL 110MG/55ML
2 PATIENT CONTROLLED ANALGESIA SYRINGE INTRAVENOUS
Status: DISCONTINUED | OUTPATIENT
Start: 2017-12-22 | End: 2017-12-23 | Stop reason: HOSPADM

## 2017-12-22 RX ADMIN — TIOTROPIUM BROMIDE 18 MCG: 18 CAPSULE ORAL; RESPIRATORY (INHALATION) at 09:11

## 2017-12-22 RX ADMIN — HEPARIN SODIUM 5000 UNITS: 5000 INJECTION, SOLUTION INTRAVENOUS; SUBCUTANEOUS at 05:48

## 2017-12-22 RX ADMIN — HEPARIN SODIUM 5000 UNITS: 5000 INJECTION, SOLUTION INTRAVENOUS; SUBCUTANEOUS at 22:11

## 2017-12-22 RX ADMIN — HEPARIN SODIUM 5000 UNITS: 5000 INJECTION, SOLUTION INTRAVENOUS; SUBCUTANEOUS at 15:49

## 2017-12-22 RX ADMIN — HYDROMORPHONE HYDROCHLORIDE 2 MG: 2 INJECTION, SOLUTION INTRAMUSCULAR; INTRAVENOUS; SUBCUTANEOUS at 22:13

## 2017-12-22 RX ADMIN — NICOTINE 21 MG: 21 PATCH, EXTENDED RELEASE TRANSDERMAL at 09:10

## 2017-12-22 RX ADMIN — SODIUM CHLORIDE 125 ML/HR: 0.9 INJECTION, SOLUTION INTRAVENOUS at 11:56

## 2017-12-22 RX ADMIN — HYDROMORPHONE HYDROCHLORIDE 1 MG: 1 INJECTION, SOLUTION INTRAMUSCULAR; INTRAVENOUS; SUBCUTANEOUS at 01:41

## 2017-12-22 RX ADMIN — HYDROMORPHONE HYDROCHLORIDE 2 MG: 2 INJECTION, SOLUTION INTRAMUSCULAR; INTRAVENOUS; SUBCUTANEOUS at 03:20

## 2017-12-22 RX ADMIN — HYDROMORPHONE HYDROCHLORIDE 1 MG: 1 INJECTION, SOLUTION INTRAMUSCULAR; INTRAVENOUS; SUBCUTANEOUS at 05:48

## 2017-12-22 RX ADMIN — HYDROMORPHONE HYDROCHLORIDE 2 MG: 2 INJECTION, SOLUTION INTRAMUSCULAR; INTRAVENOUS; SUBCUTANEOUS at 11:58

## 2017-12-22 RX ADMIN — GLIPIZIDE 10 MG: 5 TABLET ORAL at 06:03

## 2017-12-22 RX ADMIN — HYDROMORPHONE HYDROCHLORIDE 2 MG: 2 INJECTION, SOLUTION INTRAMUSCULAR; INTRAVENOUS; SUBCUTANEOUS at 15:50

## 2017-12-22 RX ADMIN — DOCUSATE SODIUM 100 MG: 100 CAPSULE, LIQUID FILLED ORAL at 22:14

## 2017-12-22 RX ADMIN — BACLOFEN 10 MG: 10 TABLET ORAL at 09:10

## 2017-12-22 RX ADMIN — HYDROMORPHONE HYDROCHLORIDE 2 MG: 2 INJECTION, SOLUTION INTRAMUSCULAR; INTRAVENOUS; SUBCUTANEOUS at 07:55

## 2017-12-22 RX ADMIN — BACLOFEN 10 MG: 10 TABLET ORAL at 22:14

## 2017-12-22 RX ADMIN — TAMSULOSIN HYDROCHLORIDE 0.4 MG: 0.4 CAPSULE ORAL at 09:10

## 2017-12-22 RX ADMIN — PAROXETINE HYDROCHLORIDE 30 MG: 20 TABLET, FILM COATED ORAL at 22:10

## 2017-12-22 RX ADMIN — GLIPIZIDE 10 MG: 5 TABLET ORAL at 15:50

## 2017-12-22 RX ADMIN — SODIUM CHLORIDE 125 ML/HR: 0.9 INJECTION, SOLUTION INTRAVENOUS at 03:20

## 2017-12-22 RX ADMIN — PRAVASTATIN SODIUM 40 MG: 40 TABLET ORAL at 15:49

## 2017-12-22 RX ADMIN — FAMOTIDINE 20 MG: 20 TABLET, FILM COATED ORAL at 09:10

## 2017-12-22 RX ADMIN — SODIUM CHLORIDE 125 ML/HR: 0.9 INJECTION, SOLUTION INTRAVENOUS at 22:15

## 2017-12-22 RX ADMIN — CLOPIDOGREL BISULFATE 75 MG: 75 TABLET ORAL at 09:10

## 2017-12-22 RX ADMIN — HYDROMORPHONE HYDROCHLORIDE 2 MG: 2 INJECTION, SOLUTION INTRAMUSCULAR; INTRAVENOUS; SUBCUTANEOUS at 19:09

## 2017-12-22 NOTE — SOCIAL WORK
SW following to assist with DCP  Case discussed with Dr Jed Cazares  Plan for skilled rehab facility placement was discussed with pt and pt is agreeable with short term rehab placement at this time  Call received from pt's sister, Miky Gonzalez, and she is aware of plan  Sister is hoping that once pt goes for rehab he may be content enough to stay long term  The likelihood of long term care is high considering pt's metastatic cancer  SW met with pt to discuss plans and facility options  Pt's preference is placement at [facility] so his physician's can follow him  Additional choices will be given if needed  Referral has been made to [facility]  SW will follow to assist with planning as needed

## 2017-12-22 NOTE — PROGRESS NOTES
Progress Note - Orthopedics   Mariza Rojas 65 y o  male MRN: [id number]  Unit/Bed#: 2 [address] Encounter: [id number]    Assessment:  1) L5 compression fracture - neurovascularly in tact, deformity of posterior, superior endplate, bony retropulsion, central canal stenosis, no evidence of cauda equina syndrome,weakness of b/l extremities noted, no progression of weakness or sensory changes  2) Metstatic disease to L5 and other cervical vertebrate - compression of multiple left sided nerve roots, no critical central canal stenosis per MRI, no cauda equina syndrome,    Plan:  1 & 2)   - TSLO brace if patient can tolerate  - PRN analgesia per FP team  - PT/OT   - WBAT  - f/u with ortho spine on discharge  - no emergency orthopedic surgical intervention  - orthopedic team will sign off   - management per heme/onc and FP    Weight bearing: WBAT    VTE Pharmacologic Prophylaxis: Heparin  VTE Mechanical Prophylaxis: sequential compression device    Subjective:  Patient was seen examined at bedside  Patient denies any acute events overnight  Patient denies any progression of his weakness in bilateral lower extremities  Patient reports his predominant issue and concern is his pain in his lower back  Patient has a sharp, stabbing, nonradiating pain in his lower back currently  Patient denies any numbness, tingling, lack of motor movement, lack of sensation in bilateral lower extremities compared to his baseline  Patient can still feel and move the  Patient has not been up and ambulating with assistance secondary to pain  Vitals: Blood pressure 129/84, pulse 103, temperature (!) 97 °F (36 1 °C), temperature source Tympanic, resp  rate 22, height 5' 6" (1 676 m), weight 90 5 kg (199 lb 8 3 oz), SpO2 94 %  ,Body mass index is 32 2 kg/m²        Intake/Output Summary (Last 24 hours) at 12/22/17 0813  Last data filed at 12/22/17 0320   Gross per 24 hour   Intake             1000 ml   Output              375 ml   Net 625 ml       Invasive Devices     Peripheral Intravenous Line            Peripheral IV 12/22/17 Right Forearm less than 1 day                Physical Exam: /84   Pulse 103   Temp (!) 97 °F (36 1 °C) (Tympanic)   Resp 22   Ht 5' 6" (1 676 m)   Wt 90 5 kg (199 lb 8 3 oz)   SpO2 94%   BMI 32 20 kg/m²   General appearance: alert, appears stated age and cooperative  Head: Normocephalic, without obvious abnormality, atraumatic  Skin: Skin color, texture, turgor normal  No rashes or lesions  Ortho Exam: b/l Lower Extremity: Thigh and calf compartments are soft and nontender to palpation  + L3-S1 SILT  + DF/PF + EHL  No pain with passive stretch/extension of toes  DP 2+ b/l, capillary refill less than 2 seconds, and foot is warm B/L  PROM and AROM knee, hip, back b/l deferred secondary to tenderness  Sensation in tact, no erythema  Palpable tenderness in lower back  Lab, Imaging and other studies:   I have personally reviewed pertinent lab results    CBC:   Lab Results   Component Value Date    WBC 7 40 12/22/2017    HGB 10 7 (L) 12/22/2017    HCT 32 1 (L) 12/22/2017    MCV 88 12/22/2017     12/22/2017    MCH 29 5 12/22/2017    MCHC 33 4 12/22/2017    RDW 14 5 12/22/2017    MPV 6 9 (L) 12/22/2017     CMP:   Lab Results   Component Value Date     12/22/2017     12/22/2017    CO2 28 12/22/2017    ANIONGAP 8 12/22/2017    BUN 8 12/22/2017    CREATININE 0 47 (L) 12/22/2017    GLUCOSE 44 (L) 12/22/2017    CALCIUM 8 7 12/22/2017    AST 50 (H) 12/22/2017    ALT 99 (H) 12/22/2017    ALKPHOS 370 (H) 12/22/2017    PROT 6 0 (L) 12/22/2017    ALBUMIN 2 1 (L) 12/22/2017    BILITOT 0 20 12/22/2017    EGFR 124 12/22/2017

## 2017-12-22 NOTE — PHYSICAL THERAPY NOTE
PT EVALUATION     12/22/17 1315   Pain Assessment   Pain Assessment Sahu-Baker FACES   Sahu-Baker FACES Pain Rating 10  (L LS area to entire LLE as per patient)   Home Living   Type of [address] [facility] One level;Ramped entrance   [address] [facility] transfer bench;Commode   Home Equipment Wheelchair-electric   Additional Comments patient reports brother and neighbor assisting with transportation, food and neighbor assists with dressing(donning pants and shoes)   Prior Function   Level of [place] Needs assistance with ADLs and functional mobility; Needs assistance with IADLs   Lives With Alone   Receives Help From Friend(s); Neighbor   ADL Assistance Needs assistance   IADLs Needs assistance   Falls in the last 6 months 0   Comments patient reporting progressive decline in function with inability to move over past 4-5 days due to intensity of pain in LS and LLE areas   Restrictions/Precautions   Other Precautions Chair Alarm; Bed Alarm; Fall Risk  (non ambulatory, uses electric WC at home)   General   Additional Pertinent History chart reviewed, patient admitted with metastasis fromcolon CA  patient with limited functional mobility with progressive decline at home , WC bound and nonambulatory prior to admission and now with inability to complete bed mobility/sitting on bed edge due to pain intensity in LLE   Family/Caregiver Present No   Cognition   Overall Cognitive Status WFL   Arousal/Participation Cooperative   Orientation Level Oriented X4   Following Commands Follows all commands and directions without difficulty   RLE Assessment   RLE Assessment (ROM WFL, strength 3/3+ with pain in L LS area with movement)   LLE Assessment   LLE Assessment (unable to fully assess due to pain intensity with movement)   Coordination   Movements are Fluid and Coordinated 0   Bed Mobility   Rolling R 2  Maximal assistance   Rolling L 2  Maximal assistance   Supine to Sit 1  Dependent   Sit to Supine 1 Dependent   Additional Comments patient required max assist of 2 to long sit in bed, with poor sitting balance and inability to assess sitting on bed edge due to pain in LLS and radicular pain to LLE/foot areas   Transfers   Sit to Stand 1  Dependent   Stand to Sit 1  Dependent   Ambulation/Elevation   Gait Assistance Not tested  (patient nonambulatory for past 3 years)   Balance   Static Sitting (unable)   Dynamic Sitting (unable )   Static Standing (unable)   Dynamic Standing (unable)   Ambulatory (unable)   Activity Tolerance   Activity Tolerance Patient limited by pain; Patient limited by fatigue;Treatment limited secondary to medical complications (Comment)  (bone mets from colon CA)   Nurse Made Aware yes   Assessment   Prognosis Poor   Problem List Decreased strength;Decreased range of motion;Decreased endurance; Impaired balance;Decreased mobility; Decreased coordination;Pain;Decreased skin integrity   Assessment Patient seen for Physical Therapy evaluation  Patient admitted with Metastasis from colon cancer [facility])  Comorbidities affecting patient's physical performance include: colon CA with mets to bone, rib fractures, CARLOS, spinal stenosis, anxiety/depression, DM, falls, weakness,gait dysfunction  Personal factors affecting patient at time of initial evaluation include: inability to ambulate household distances, inability to navigate community distances, inability to navigate level surfaces without external assistance, inability to perform dynamic tasks in community, limited home support, positive fall history, anxiety, inability to perform physical activity, inability to perform ADLS and inability to perform IADLS    Prior to admission, patient was independent with functional mobility with electric WC, requiring assist for ADLS, requiring assist for IADLS and home with family assist   Please find objective findings from Physical Therapy assessment regarding body systems outlined above with impairments and limitations including weakness, decreased ROM, impaired balance, decreased endurance, impaired coordination, gait deviations, pain, decreased activity tolerance, decreased functional mobility tolerance, fall risk and decreased skin integrity  The Barthel Index was used as a functional outcome tool presenting with a score of 20 today indicating marked limitations of functional mobility and ADLS  Patient's clinical presentation is currently unstable/unpredictable as seen in patient's presentation of changing level of pain, increased fall risk, new onset of impairment of functional mobility, decreased endurance and new onset of weakness  Pt would benefit from continued Physical Therapy treatment to address deficits as defined above and maximize level of functional mobility  As demonstrated by objective findings, the assigned level of complexity for this evaluation is high  Goals   Patient Goals improve quality of life   STG Expiration Date (1 to 7 days)   Short Term Goal #1 assess sitting on bed edge, increase tolerance to sitting on bed edge to several minutes for strengthening/core   Short Term Goal #2 increase mobility/PROM LLE as tolerated with pain control   LTG Expiration Date (8 to14 days)   Long Term Goal #1 bed mobility with mod assist to sit on bed edge for 3-5 min   Long Term Goal #2 assess and complete transfers from bed to [facility] possibly with sliding board as tolerated with pain   Plan   Treatment/Interventions ADL retraining;Functional transfer training;LE strengthening/ROM; Therapeutic exercise; Endurance training;Patient/family training;Equipment eval/education; Bed mobility   PT Frequency (3-5x/week)   Recommendation   Recommendation (STR/?hospice)   Barthel Index   Feeding 10   Bathing 0   Grooming Score 0   Dressing Score 0   Bladder Score 5   Bowels Score 5   Toilet Use Score 0   Transfers (Bed/Chair) Score 0   Mobility (Level Surface) Score 0   Stairs Score 0   Barthel Index Score 20

## 2017-12-22 NOTE — PLAN OF CARE
Problem: DISCHARGE PLANNING - CARE MANAGEMENT  Goal: Discharge to post-acute care or home with appropriate resources  INTERVENTIONS:  - Conduct assessment to determine patient/family and health care team treatment goals, and need for post-acute services based on payer coverage, community resources, and patient preferences, and barriers to discharge  - Address psychosocial, clinical, and financial barriers to discharge as identified in assessment in conjunction with the patient/family and health care team  - Arrange appropriate level of post-acute services according to patient's   needs and preference and payer coverage in collaboration with the physician and health care team  - Communicate with and update the patient/family, physician, and health care team regarding progress on the discharge plan  - Arrange short term rehab placement  - Arrange appropriate transportation to post-acute venues     Outcome: Progressing  Pt accepted by [facility] and bed available for pt at [facility]  Pt can be transferred whenever medically ready

## 2017-12-22 NOTE — OCCUPATIONAL THERAPY NOTE
OT EVALUATION   12/22/17 3850   Note Type   Note type Eval only   Restrictions/Precautions   Other Precautions Fall Risk;Pain;Bed Alarm; Chair Alarm  (non ambulatory)   Pain Assessment   Pain Assessment Sahu-Baker FACES   Sahu-Baker FACES Pain Rating 10   Pain Type Acute pain   Pain Location Leg;Back   Pain Orientation Left   Pain Radiating Towards left foot   Home Living   Type of Home Apartment  [facility])   Home Layout One level;Elevator   Bathroom Shower/Tub Tub/shower unit   Bathroom Toilet Standard   Bathroom Equipment Tub transfer bench;Commode   Bathroom Accessibility Accessible via wheelchair   Home Equipment Wheelchair-electric;Cane   Additional Comments PTA, pt transfers from bed to electric wc, electric wc to tub transfer bench and commode; he has not walked in 3 years  Prior Function   Level of [place] Needs assistance with IADLs; Needs assistance with ADLs and functional mobility   Lives With Alone   Receives Help From Friend(s); Neighbor   ADL Assistance Needs assistance   IADLs Needs assistance   Falls in the last 6 months (1 on admission, on floor for several hours)   Comments relies on neighbors/friends for meal prep/MD appointments; LB dressing; pt functionally declining over last few months and requiring a great deal of assist to function safely   Lifestyle   Intrinsic Gratification used to play the electric guitar   Subjective   Subjective Please don't move my left leg!    ADL   Where Assessed Supine, bed   Eating Assistance 5  Supervision/Setup   Grooming Assistance 5  Supervision/Setup   UB Bathing Assistance 4  Minimal Assistance   LB Bathing Assistance 1  Total Assistance   UB Dressing Assistance 4  Minimal Assistance   LB Dressing Assistance 1  Total Assistance   Toileting Assistance  1  Total Assistance   Functional Assistance 1  Total Assistance   Bed Mobility   Rolling R 2  Maximal assistance   Rolling L 2  Maximal assistance   Supine to Sit 1  Dependent   Sit to Supine 1 Dependent   Transfers   Sit to Stand 1  Dependent   Stand to Sit 1  Dependent   Functional Mobility   Functional Mobility (pt unable)   Balance   Static Sitting (unable)   Dynamic Sitting (unable)   Static Standing (unable)   Dynamic Standing (unable)   Ambulatory (unable)   Activity Tolerance   Activity Tolerance Patient limited by pain; Patient limited by fatigue;Treatment limited secondary to medical complications (Comment)  (metastatic colon cancer (lungs, bones))   RUE Assessment   RUE Assessment WFL  (3+/5 grossly)   LUE Assessment   LUE Assessment WFL  (3+/5 grossly)   Hand Function   Gross Motor Coordination Functional   Fine Motor Coordination Functional   Cognition   Overall Cognitive Status WFL   Arousal/Participation Cooperative   Attention Within functional limits   Orientation Level Oriented X4   Memory Within functional limits   Following Commands Follows all commands and directions without difficulty   Assessment   Limitation Decreased ADL status; Decreased endurance;Decreased self-care trans;Decreased high-level ADLs; Decreased UE strength   Prognosis Poor   Assessment Evaluation compiled via occupational profile, medical chart review and clinical observation of ADLs, transfers and mobility  Pt admitted s/p fall during an attempt to transfer from electric wc to toilet  Pt on floor for several hours  PTA, pt functioning from  level and very limited with regard to caring for himself  He has been non ambulatory for ~ 3 years  Today, pt is requiring total assist for all mobility  He is able to sit in long sitting in bed with max assist of 2; otherwise is unable to transfer to edge of bed 2/2 severe pain in BLE's with movement  He is total care for BLE ADL's however can engage in UB ADLs with setup, when placed in appropriate, upright position  He is able to feed/groom himself with setup   OT to intervene to maximize pt's quality of life to assist with increasing his ability to engage in his own personal care through compensatory strategy training, interdisciplinary education on positioning and general UB strength training  Co morbidities affecting functional outcome include COPD, cancer with mets, anxiety, spinal stenosis, DM, CVA  Based on co morbidities and PLOF in comparison to today's functional performance, this evaluation is considered a highly complex level of clinical decision making  The Barthel Index is used as a functional outcome measure with a score today of 20 which suggests marked functional limitations  Goals   Patient Goals improve quality of life   STG Time Frame (1-7 days)   Short Term Goal #1 Pt will tolerate sitting edge of bed x 2 mins with max A in order to increase trunk strength/control in order to maximize pt's quality of life to engage in ADLs  Short Term Goal #2 Pt will transfer wc to bed with max A and sliding board use, in order to increase OOB time/maximize quality of life with upright sitting position  LTG Time Frame (8-14 days)   Long Term Goal #1 Pt will tolerate sitting edge of bed x 4 mins with mod A in order to increase trunk strength/control in order to maximize pt's quality of life to engage in ADLs  Long Term Goal #2 Pt will transfer wc to bed with mod A and sliding board use, in order to increase OOB time/maximize quality of life with upright sitting position  ADL Goals   Pt Will Perform Eating Supine/sitting in bed; In wheelchair  (STG: setup, LTG: independent)   Pt Will Perform Grooming In wheelchair  (STG: supervision, LTG: independent; upright siting in bed)   Pt Will Perform Bathing In wheelchair  (STG: supervision UB, LTG: independent UB; upright in bed)   Pt Will Perform UE Dressing In wheelchair  (STG: mod A, LTG: min A; upright sitting in bed)   Pt Will Perform Toileting With bedside commode  (STG: max A, LTG: mod A)   Plan   Treatment Interventions ADL retraining;Functional transfer training;UE strengthening/ROM; Endurance training;Patient/family training;Equipment evaluation/education; Compensatory technique education; Energy conservation   OT Frequency 3-5x/wk   Recommendation   OT Discharge Recommendation Short Term Rehab  (hospice)   Barthel Index   Feeding 10   Bathing 0   Grooming Score 0   Dressing Score 0   Bladder Score 5   Bowels Score 5   Toilet Use Score 0   Transfers (Bed/Chair) Score 0   Mobility (Level Surface) Score 0   Stairs Score 0   Barthel Index Score 20

## 2017-12-22 NOTE — PROGRESS NOTES
[facility] - Regina Bend 65 y o  male MRN: [id number]  Unit/Bed#: [address] [place] [address] B Encounter: [id number]      Assessment/Plan:    Fall:  · Likely 2/2 Mechanical vs  Cardiogenic vs  Neurogenic  · Baseline as per patient:    · Difficulty with ambulation; uses a wheelchair and cane; reports difficulty with balance  · Monitor with Telemetry  · Neurochecks q4h     Leg Pain:  · Likely 2/2 Metastatic Disease vs  Spinal Stenosis vs  Cauda Equina Syndrome  · MRI (Thoracic/Lumbar): · Extensive diffuse metastatic disease throughout the lower thoracic spine, lumbar spine and imaged portions of the sacrum and bilateral tacho  · Metastatic disease at L5 extends into the central canal and likely impinges multiple left-sided nerve roots  , Critical central stenosis is not present    · CT (Thoracic/Lumbar) Spine on admission:  · Metastatic Disease of the L5 vertebral body  · Acute, nondisplaced fracture posterior aspect of the 9th rib, likely to pathologic bone lesion  · Acute mildly displaced fracture through pathologic bone lesion involving the posterior medial aspect of the right 11th rib  · Significant worsening of Bony Metastatic Disease  · Worsening Pulmonary Metastatic Disease  · CT (Cervical Spine) on admission:  · Stable degenerative changes, no cervical spine fracture or traumatic malalignment  · Possible bony Metastatic Disease involving the clivus and T3 vertebral body, stable  · Worsening pulmonary metastatic disease  · CK-MB on admission: 0 6  · Total Ck: 204  · Pain Control: Dilaudid 4mg IV q6h  · Consult to Orthopedic Surgery:  No concern for cauda equina syndrome  · Consult to Hematology/Oncology: CEA: 29 8, Pain Management     DEBBIE - Resolved:  · Likely 2/2 Multifactorial (Dehydration and Rhabdomyolysis)  · Baseline Cr: 0 74-0 95  · Current Cr: f/u AM labs  · Continue IV NS 125cc     Leukocytosis - Resolved:  · 2/2 Reactive vs  Infectious  · WBC on admission: 11 6  · Current WBC: f/u AM labs  · Will monitor with daily CBC     Hyponatremia:  · Likely 2/2 Volume Depletion  · Na on admission: 132  · Current Na: f/u AM labs  · Continue IV NS 125cc/hr  · Will monitor with daily BMP      Hypotension - Resolved:  · Likely 2/2 Dehydration  · Current BP:  150/73  · Continue Home Medications: Lisinopril and Metoprolol  · Monitor with daily vitals     Hx of Metastatic Colon Cancer:  · Metastasis to Lung and Liver and Bone  · Consult Hematology/Oncology: CEA: 29 8, Pain Management     Hyperlipidemia:  · Stable; continue home medication:  Lovastatin     CAD:  · s/p PCIx2; Cardiac Cath on 1/13/116: No evidence of CAD  · Stable; continue on home medication: ASA 81 and Plavix 75mg     COPD:  · Stable; continue on home medication:  Spiriva and ProAir     CARLOS:  · Stable; continue with CPAP     BPH:  · Stable; continue on home medication: Flomax     Depression/Anxiety:  · Stable; continue on home medication: Paxil     Tobacco Dependence:  · Continue Nicoderm 21mg patch  · Counseled on Smoking Cessation     FEN:  · Cardiac Diet + NS 125cc/hr / Replete Electrolytes as Needed / Heparin + SCDs      Subjective:   Patient seen and examined at bedside  Notes increasing pain overnight  Objective:     Vitals: Blood pressure 150/73, pulse 99, temperature 98 1 °F (36 7 °C), temperature source Oral, resp  rate 20, height 5' 6" (1 676 m), weight 90 5 kg (199 lb 8 3 oz), SpO2 93 %  ,Body mass index is 32 2 kg/m²    Wt Readings from Last 3 Encounters:   12/20/17 90 5 kg (199 lb 8 3 oz)   12/20/17 90 3 kg (199 lb)   12/04/17 92 5 kg (204 lb)       Intake/Output Summary (Last 24 hours) at 12/22/17 0067  Last data filed at 12/22/17 0320   Gross per 24 hour   Intake             1000 ml   Output              375 ml   Net              625 ml       Physical Exam: General appearance: alert, cooperative and no distress  Lungs: clear to auscultation bilaterally  Heart: regular rate and rhythm, S1, S2 normal, no murmur, click, rub or gallop  Extremities: Limited 2/2 Pain; Limited Ambulation  Pulses: 2+ and symmetric  Neurologic: CN 2-12; Left LE: 1/5 Motor w  Intact Sensation, Right LE: 3/5 Motor w   Decreased Sensation; Limited ROM    Recent Results (from the past 24 hour(s))   Fingerstick Glucose (POCT)    Collection Time: 12/21/17  7:19 AM   Result Value Ref Range    POC Glucose 50 (L) 65 - 140 mg/dl   Fingerstick Glucose (POCT)    Collection Time: 12/21/17  8:26 AM   Result Value Ref Range    POC Glucose 130 65 - 140 mg/dl   Fingerstick Glucose (POCT)    Collection Time: 12/21/17 11:17 AM   Result Value Ref Range    POC Glucose 196 (H) 65 - 140 mg/dl   Fingerstick Glucose (POCT)    Collection Time: 12/21/17  5:15 PM   Result Value Ref Range    POC Glucose 179 (H) 65 - 140 mg/dl   Fingerstick Glucose (POCT)    Collection Time: 12/21/17  8:49 PM   Result Value Ref Range    POC Glucose 286 (H) 65 - 140 mg/dl       Current Facility-Administered Medications   Medication Dose Route Frequency Provider Last Rate Last Dose    acetaminophen (TYLENOL) tablet 650 mg  650 mg Oral Q6H PRN Paola Vera MD        albuterol (PROVENTIL HFA,VENTOLIN HFA) inhaler 1 puff  1 puff Inhalation Q6H PRN Paola Vera MD        baclofen tablet 10 mg  10 mg Oral BID Paola Vera MD   10 mg at 12/21/17 2144    clopidogrel (PLAVIX) tablet 75 mg  75 mg Oral Daily Paola Vera MD   75 mg at 12/21/17 0931    famotidine (PEPCID) tablet 20 mg  20 mg Oral Daily Paola Vera MD   20 mg at 12/21/17 0931    glipiZIDE (GLUCOTROL) tablet 10 mg  10 mg Oral BID AC Paola Vera MD   10 mg at 12/22/17 0603    heparin (porcine) subcutaneous injection 5,000 Units  5,000 Units Subcutaneous [facility] Paola Vera MD   5,000 Units at 12/22/17 0548    HYDROmorphone (DILAUDID) 2 mg/mL injection 2 mg  2 mg Intravenous Q4H PRN Paola Vera MD   2 mg at 12/22/17 0320    insulin detemir (LEVEMIR) subcutaneous injection 40 Units  40 Units Subcutaneous HS Paola Vera MD   40 Units at 12/21/17 2149    insulin lispro (HumaLOG) 100 units/mL subcutaneous injection 1-6 Units  1-6 Units Subcutaneous TID [facility] Ashu Sewell MD   1 Units at 12/21/17 1801    nicotine (NICODERM CQ) 21 mg/24 hr TD 24 hr patch 21 mg  21 mg Transdermal Daily Ashu Sewell MD   21 mg at 12/21/17 0930    PARoxetine (PAXIL) tablet 30 mg  30 mg Oral HS Ashu Sewell MD   30 mg at 12/21/17 2141    pravastatin (PRAVACHOL) tablet 40 mg  40 mg Oral Daily With Ayaka Campos MD   40 mg at 12/21/17 1801    sodium chloride 0 9 % infusion  125 mL/hr Intravenous Continuous Ragini Dunn  mL/hr at 12/22/17 0320 125 mL/hr at 12/22/17 0320    tamsulosin (FLOMAX) capsule 0 4 mg  0 4 mg Oral QAM Ashu Sewell MD   0 4 mg at 12/21/17 0931    tiotropium (SPIRIVA) capsule for inhaler 18 mcg  18 mcg Inhalation Daily Ashu Sewell MD   18 mcg at 12/21/17 0945       Invasive Devices     Peripheral Intravenous Line            Peripheral IV 12/22/17 Right Forearm less than 1 day                Lab, Imaging and other studies: I have personally reviewed pertinent reports      VTE Pharmacologic Prophylaxis: Heparin  VTE Mechanical Prophylaxis: sequential compression device          Ashu Sewell MD

## 2017-12-22 NOTE — SOCIAL WORK
SW following to assist with DCP  Skilled rehab facility placement is planned  Pt has been accepted by [facility] and bed available for pt at [facility]  Will notify physicians  SW will continue to follow to monitor progress and assist with transfer when discharged

## 2017-12-22 NOTE — PLAN OF CARE
Problem: DISCHARGE PLANNING - CARE MANAGEMENT  Goal: Discharge to post-acute care or home with appropriate resources  INTERVENTIONS:  - Conduct assessment to determine patient/family and health care team treatment goals, and need for post-acute services based on payer coverage, community resources, and patient preferences, and barriers to discharge  - Address psychosocial, clinical, and financial barriers to discharge as identified in assessment in conjunction with the patient/family and health care team  - Arrange appropriate level of post-acute services according to patient's   needs and preference and payer coverage in collaboration with the physician and health care team  - Communicate with and update the patient/family, physician, and health care team regarding progress on the discharge plan  - Arrange short term rehab placement  - Arrange appropriate transportation to post-acute venues    Outcome: Progressing  STR referral made to [facility]

## 2017-12-22 NOTE — PROGRESS NOTES
Hematology - Oncology Progress Note    Karis Galvan, 1960, [id number]  2 South 204/2 South 204 B      Impression and plan:    3 52-year-old male with metastatic colon cancer (liver, lungs, bones)  Patient has a poor performance status  Mr Rossana Jones never received chemotherapy on time  Recent scans demonstrated disease progression  As discussed previously, I do not see Mr Rossana Jones ever getting better to a point where he would enjoy a good quality of life  I would be very reluctant to continue chemotherapy with this patient under any circumstance  Respectfully, I do not believe this will be an eventual possibility  Patient is best suited for hospice evaluation at this time - hospice evaluation has been placed  Patient's DNR status will need to be changed if/when he goes on hospice      2  Pain control  The cancer and bone metastases obviously will cause pain  This is exacerbated by the patient's prior difficult narcotic history  At this point, I think it absolute crucial that patient's pain be controlled any way possible (this is the only thing left that we can offer this patient)  Once again, I do not believe this patient is capable of being discharged to home on a narcotic regimen      3  Bone metastases - no clear evidence of cord involvement  Once again, it is important to control the patient's pain  I do not believe that radiation to the lesions would be beneficial for the patient  If patient was to need emergent RT, Mr Rossana Jones would need to be transferred to [place]      4  Elevated BUN and creatinine - now better  This may have been due to patient's inability to care for herself at home/dehydration  Rehydration is ongoing      5  Decreased CEA level  It is difficult to explain the decrease CEA level without treatment  Respectfully, I believe the point is academic; patient has scan evidence of disease progression and by clinical exam, there has been progression  6  Discharge disposition  Social service in case management is evaluating the patient  Patient has a complicated social history  Mr Amanda Lewis lives by himself; the closest relative is his sister in [place]  Sister apparently wants the patient transferred to [place] - Mr Amanda Lewis would clearly fare better with family members close by  That being said, it will be difficult to transfer this patient to [facility] in his present condition  Mr Amanda Lewis understands this  Chief complaint: status post fall, metastatic colon cancer    History of present illness: 41-year-old male admitted with the above  Mr Amanda Lewis has a complicated colon cancer history (information is listed below in blue)  Patient was first seen by hematology/oncology in May 2017  Workup demonstrated a colon mass as well as liver lesions  Biopsy of the liver and colon demonstrated carcinoma  Patient had been treated with chemotherapy but had been very noncompliant with follow-ups and receiving the treatments (rarely given on time)  More recently, patient decided to discontinue all medications  Mr Amanda Lewis was seen in the office in early December 2017 wishing to resume chemotherapy      Although the specifics are not clear, patient has a history of falling in the past (including falling in the oncology office previously)  Patient has had issues with narcotics and pain control  Although the specifics are not entirely clear, Mr Amanda Lewis is not fully capable of understanding the seriousness of the situation  Patient has a difficult living situation - closest family member is in [facility]  Presently patient states feeling about the same as before  Still with back pain, abdominal pain and lower extremity pain  Appetite is okay, no nausea vomiting  Activities are extremely limited  Nursing staff denied any other issues throughout the day      Cancer history: [employer] note 12/4/17      HPI 41-year-old male previously referred for the above   Workup was consistent with metastatic colon cancer  Mr Onofre Canales had been on FOLFOX/Avastin but recently decided not to continue with oncology  Patient was last seen in this office in the end of August 2017      Mr Onofre Canales stated that he developed severe abdominal pain approximately 7 years ago  Patient was seen by Dr Caryl Hayward and underwent emergency surgery  Although the specifics are not presently available, patient stated that a section of his bowel (small cell versus large) was removed because of necrosis  Reportedly there was no evidence of malignancy  More recently, patient has had problems with nausea, abdominal pain and bowel changes  Office note by Dr Fabio Melgar from May 23, 2017 stated that patient had a cecal mass that was biopsied and found to be infiltrative carcinoma  Workup demonstrated a colon mass, liver lesions and pulmonary lesions  A colonoscopy demonstrated adenocarcinoma; liver biopsy demonstrated the same       Discussion summary     56-year-old male with multiple medical problems previously found to have a colon mass, liver lesions and pulmonary lesions  Recent biopsies demonstrated M1/stage IV colon cancer  Issues:     1  Patient recently underwent repeat scans  The results are not available but demonstrated disease progression  We discussed this  Patient wishes to resume chemotherapy  The plan is to start FOLFIRI/Avastin  Nursing staff spent time with patient today going over the specifics  This office will get the most recent test results to the chart      Regimen: FOLFIRI + Avastin  Leucovorin 400 mg/meter squared IV day 1   5- mg/meter squared IV day 1   Irinotecan 180 mg/meter squared IV day 1  Continuous infusion 5-FU 1200 mg/meter squared begin on day 1 x 46 hours  Avastin 5mg/kg, Day 1  Cycle is 14 days  goal = prolongation of life     2   Pain control issues  Patient states that pain is relatively controlled  Patient sees a pain       Patient is to return in 4 weeks   Mr Onofre Canales knows to call if he has any other oncology questions or concerns       Carefully review your medication list and verify that the list is accurate and up-to-date   Please call the hematology/oncology office if there are medications missing from the list, medications on the list that you are not currently taking or if there is a dosage or instruction that is different from how you're taking a medication        Hospital medications list:    baclofen 10 mg Oral BID   clopidogrel 75 mg Oral Daily   docusate sodium 100 mg Oral BID   famotidine 20 mg Oral Daily   glipiZIDE 10 mg Oral BID AC   heparin (porcine) 5,000 Units Subcutaneous Q8H [address]   insulin detemir 40 Units Subcutaneous HS   insulin lispro 1-6 Units Subcutaneous TID AC   nicotine 21 mg Transdermal Daily   PARoxetine 30 mg Oral HS   pravastatin 40 mg Oral Daily With Dinner   tamsulosin 0 4 mg Oral QAM   tiotropium 18 mcg Inhalation Daily     Physical exam  /73   Pulse (!) 110   Temp 97 9 °F (36 6 °C) (Oral)   Resp 20   Ht 5' 6" (1 676 m)   Wt 90 5 kg (199 lb 8 3 oz)   SpO2 94%   BMI 32 20 kg/m²   Constitutional: Well formed, well-nourished  in no apparent distress  Eyes: PERRL, conjunctiva orange, anicteric    HENT: Atraumatic, external ears normal, nose normal,    Oropharynx: moist, no pharyngeal exudates, no thrush, pink  Neck: limited range of motion, no adenopathy  Respiratory: scattered bilateral rhonchi, fair air entry bilaterally  Cardiovascular: Normal rate, normal rhythm, no murmurs, no gallops, no rubs    GI: Soft, + obese, + slightly distended, + diffusely tender but no rigidity or rebound, + bowel sounds, anterior abdominal wall ulcer same as before  : No costovertebral angle tenderness, normal reproductive organs, no discharge  Musculoskeletal: + tenderness in thighs, calves, buttocks, rib cage bilaterally - about the same as before  Integument: Kim dry, pale, scattered ecchymoses and purpura, + second cubitus (covered)  Lymphatic: No adenopathy in the neck, supra-clavicular region, axilla and groin bilaterally  Extremities: no lower extremity edema, no cords, pulses are 1+  Neurologic: Alert & oriented x 3, CN 2-12 normal, normal motor function, normal sensory function, no focal deficits noted  Psychiatric: responsive, pleasant, appropriate, response time within normal limits  Rectal: Deferred    Laboratory test results    Results for Freida Hamman (MRN [id number]) as of 12/22/2017 16:15   Ref  Range 12/22/2017 05:29   WBC Latest Ref Range: 4 80 - 10 80 Thousand/uL 7 40   RBC Latest Ref Range: 4 70 - 6 10 Million/uL 3 64 (L)   Hemoglobin Latest Ref Range: 14 0 - 18 0 g/dL 10 7 (L)   Hematocrit Latest Ref Range: 42 0 - 52 0 % 32 1 (L)   MCV Latest Ref Range: 82 - 98 fL 88   MCH Latest Ref Range: 27 0 - 31 0 pg 29 5   MCHC Latest Ref Range: 31 4 - 37 4 g/dL 33 4   RDW Latest Ref Range: 11 6 - 15 1 % 14 5   Platelets Latest Ref Range: 130 - 400 Thousands/uL 293   MPV Latest Ref Range: 8 9 - 12 7 fL 6 9 (L)       Results for Freida Hamman (MRN [id number]) as of 12/22/2017 16:15   Ref  Range 5/19/2017 05:18 12/21/2017 05:24   CARCINOEMBRYONIC ANTIGEN Latest Ref Range: 0 0 - 3 0 ng/mL 90 2 (H) 29 8 (H)       Results for Freida Hamman (MRN [id number]) as of 12/22/2017 16:15   Ref   Range 12/22/2017 05:29   Sodium Latest Ref Range: 136 - 145 mmol/L 140   Potassium Latest Ref Range: 3 5 - 5 3 mmol/L 4 2   Chloride Latest Ref Range: 100 - 108 mmol/L 104   CO2 Latest Ref Range: 21 - 32 mmol/L 28   Anion Gap Latest Ref Range: 4 - 13 mmol/L 8   BUN Latest Ref Range: 5 - 25 mg/dL 8   Creatinine Latest Ref Range: 0 60 - 1 30 mg/dL 0 47 (L)   Glucose Latest Ref Range: 65 - 140 mg/dL 44 (L)   Calcium Latest Ref Range: 8 3 - 10 1 mg/dL 8 7   AST Latest Ref Range: 5 - 45 U/L 50 (H)   ALT Latest Ref Range: 12 - 78 U/L 99 (H)   Alkaline Phosphatase Latest Ref Range: 46 - 116 U/L 370 (H)   Total Protein Latest Ref Range: 6 4 - 8 2 g/dL 6 0 (L) Albumin Latest Ref Range: 3 5 - 5 0 g/dL 2 1 (L)   Total Bilirubin Latest Ref Range: 0 20 - 1 00 mg/dL 0 20   eGFR Latest Units: ml/min/1 73sq m 124   Phosphorus Latest Ref Range: 2 7 - 4 5 mg/dL 3 2   Magnesium Latest Ref Range: 1 6 - 2 6 mg/dL 1 1 (L)       Prior blood work results     11/30/17 BUN = 15 creatinine = 0 81 calcium = 9 2 AST = 83 ALT = 145 total bilirubin = 0 40 alkaline phosphatase = 394    WBC = 11 5 hemoglobin = 13 hematocrit = 38 8 platelet = 078 neutrophil = 70%     7/27/17 WBC = 7 7 hemoglobin = 13 5 hematocrit = 40 platelet = 534 neutrophil = 50% BUN = 12 creatinine = 0 96 LFTs WNL  7/13/17 WBC = 6 2, hemoglobin = 13 1, hematocrit = 39 5, platelet count = 575, BUN = 11, creatinine = 0 95, calcium = 9 0, LFTs = WNL, alkaline phosphatase 251, urinalysis demonstrates trace ketones without proteinuria  6/19/17 WBC = 11 0, hemoglobin = 12 8, hematocrit = 37 9 0, platelet count = 832, BUN = 15, creatinine 0 74, calcium = 9 2, LFTs = WNL, alkaline phosphatase 248  6/6/17 WBC = 8 3 hemoglobin = 12 1 hematocrit = 36 9 platelet = 026 BUN = 11 creatinine = 0 77  5/19/17 BUN = 6 creatinine = 0 65 WBC = 8 3 hemoglobin = 12 9 hematocrit = 38 9 platelet = 430 CEA = 90 2  5/1/17 BUN = 19 creatinine = 0 98 calcium = 9 9 AST = 80 = high ALT = 139 = high alkaline phosphatase = 236 = high total protein = 7 7 total bilirubin = 0 50 WBC = 12 6 hemoglobin = 15 6 hematocrit = 48 MCV = 89 RDW = 12 9 platelet = 651 neutrophil = 64% lymphocyte = 24% monocyte = 9% eosinophil = 3%        Radiology reports     12/20/17 CT cervical spine  1   Stable degenerative changes   No cervical spine fracture or traumatic malalignment  2   Possible bony metastatic disease involving the clivus and C3 vertebral body, stable    3   Worsening pulmonary metastatic disease      12/20/17 CT of thoracic and lumbar spine  1   Metastatic disease of the L5 vertebral body   There is compression deformity of the posterior, superior endplate with bony retropulsion   This results in moderate central stenosis and severe bilateral lateral recess narrowing   The findings are suspicious for acute, pathologic compression fracture  2   Acute, nondisplaced fracture posterior aspect of the right 9th rib, likely through pathologic bone lesion   No evidence of pneumothorax  3   Acute, mildly displaced fracture through pathologic bone lesion involving the posterior medial aspect of the right 11th rib   No evidence of pneumothorax  4   Significant worsening of bony metastatic disease  5   Worsening pulmonary metastatic disease      12/20/17 MRI C-spine   Multiple metastatic lesions of the clivus, cervical spine and thoracic spine with marrow edema most prominent in the left side of the C5 vertebral body      12/20/17 MRI lumbar spine   Extensive diffuse metastatic disease throughout the lower thoracic spine, lumbar spine and imaged portions of the sacrum and bilateral tacho    Metastatic disease at L5 extends into the central canal and likely impinges multiple left-sided nerve roots   Critical central stenosis is not present       Prior scans     5/1/17 CTA/chest did not demonstrate any evidence of a pulmonary embolism  Patient had bilateral pulmonary nodules and worsening mediastinal and hilar adenopathy suspicious for metastatic disease  The largest lymph nodes measured 3 6 x 2 8 cm in the right paratracheal region  Patient had 2 0 x 2 1 cm lymph nodes in the right pericardiac region  5/1/17 CAT scan of the abdomen and pelvis demonstrated multiple low-density lesions throughout both lobes of the fatty liver including a 4 2 x 4 4 cm mass in the right hepatic dome and a 5 5 x 4 9 mass in the lateral segment of the left lobe  There was an ill-defined lobular mass at the base of the cecum concerning for primary malignancy with metastatic iliocolic and retroperitoneal adenopathy   There was an asymmetric enlargement and an ill definition of the left iliacus muscle concerning for retroperitoneal hematoma  17 CAT scan of the lumbar spine demonstrated mild inflammatory changes surrounding the prominent iliacus muscle, retroperitoneal hematoma suspected  Patient also had retroperitoneal adenopathy         Pathology      17 date of report 17 [employer] NGS KRAS Sequenc[employer] mutation for K-seb p [id number]      17 liver, 6 cores demonstrated metastatic adenocarcinoma in the background of dense fibrosis and necrosis compatible with a colonic origin      17 large intestine, cecum demonstrated moderately differentiated adenocarcinoma  Mismatch repair protein panel demonstrated all genes to be expressed

## 2017-12-22 NOTE — PROGRESS NOTES
Hematology - Oncology Progress Note    Allison Rader, 1960, [id number]  2 [address] 204/2 South 204 B      Impression and plan:    3 52-year-old male with metastatic colon cancer (liver, lungs, bones)  Patient has a poor performance status  Mr Rosalia Zelaya never received chemotherapy on time  Recent scans demonstrated disease progression  As discussed previously, I do not see Mr Rosalia Zelaya ever getting better to a point where he would enjoy a good quality of life  I would be very reluctant to continue chemotherapy with this patient under any circumstance  Respectfully, I do not believe this will be an eventual possibility  Patient is best suited for hospice evaluation at this time      2  Pain control  The cancer and bone metastases obviously will cause pain  This is exacerbated by the patient's prior difficult narcotic history  At this point, I think it absolute crucial that patient's pain be controlled any way possible (this is the only thing left that we can offer this patient)  Once again, I do not believe this patient is capable of being discharged to home on a narcotic regimen      3  Bone metastases - no clear evidence of cord involvement  Once again, it is important to control the patient's pain  I do not believe that radiation to the lesions would be beneficial for the patient  If patient was to need emergent RT, Mr Rosalia Zelaya would need to be transferred to [place]      4  Elevated BUN and creatinine - now better  This may have been due to patient's inability to care for herself at home/dehydration  Rehydration is ongoing      5  Decreased CEA level  It is difficult to explain the decrease CEA level without treatment  Respectfully, I believe the point his academic; patient has scan evidence of disease progression and by clinical exam, there has been progression  6  Discharge disposition  Social service in case management is evaluating the patient  Patient has a complicated social history     Kadeem Jiménez lives by himself; the closest relative is his sister in [place]  Sister apparently wants the patient transferred to [place] - Mr Kadeem Jiménez would clearly fare better with family members close by  I re-discussed hospice with the patient - Mr Kadeem Jiménez agrees to hospice evaluation at this time  Chief complaint: status post fall, metastatic colon cancer    History of present illness: 67-year-old male admitted with the above  Mr Kadeem Jiménez has a complicated colon cancer history (information is listed below in blue)  Patient was first seen by hematology/oncology in May 2017  Workup demonstrated a colon mass as well as liver lesions  Biopsy of the liver and colon demonstrated carcinoma  Patient had been treated with chemotherapy but had been very noncompliant with follow-ups and receiving the treatments (rarely given on time)  More recently, patient decided to discontinue all medications  Mr Kadeem Jiménez was seen in the office in early December 2017 wishing to resume chemotherapy      Although the specifics are not clear, patient has a history of falling in the past (including falling in the oncology office previously)  Patient has had issues with narcotics and pain control  Although the specifics are not entirely clear, Mr Kadeem Jiménez is not fully capable of understanding the seriousness of the situation  Patient has a difficult living situation - closest family member is in [place]  Presently patient states feeling +/-, about the same as before  Patient has abdominal pain and lower extremity pain - slightly less/better than before  Appetite is good; patient denies any GI or  issues  Activities are extremely limited  No chest pain or pressure  Patient is sleeping relatively well      Cancer history: [employer] note 12/4/17      HPI 67-year-old male previously referred for the above  Workup was consistent with metastatic colon cancer   Mr Kadeem Jiménez had been on FOLFOX/Avastin but recently decided not to continue with oncology  Patient was last seen in this office in the end of August 2017      Mr Iram Rayo stated that he developed severe abdominal pain approximately 7 years ago  Patient was seen by Dr Rashel Mackay and underwent emergency surgery  Although the specifics are not presently available, patient stated that a section of his bowel (small cell versus large) was removed because of necrosis  Reportedly there was no evidence of malignancy  More recently, patient has had problems with nausea, abdominal pain and bowel changes  Office note by Dr Sandro Rutherford from May 23, 2017 stated that patient had a cecal mass that was biopsied and found to be infiltrative carcinoma  Workup demonstrated a colon mass, liver lesions and pulmonary lesions  A colonoscopy demonstrated adenocarcinoma; liver biopsy demonstrated the same       Discussion summary     60-year-old male with multiple medical problems previously found to have a colon mass, liver lesions and pulmonary lesions  Recent biopsies demonstrated M1/stage IV colon cancer  Issues:     1  Patient recently underwent repeat scans  The results are not available but demonstrated disease progression  We discussed this  Patient wishes to resume chemotherapy  The plan is to start FOLFIRI/Avastin  Nursing staff spent time with patient today going over the specifics  This office will get the most recent test results to the chart      Regimen: FOLFIRI + Avastin  Leucovorin 400 mg/meter squared IV day 1   5- mg/meter squared IV day 1   Irinotecan 180 mg/meter squared IV day 1  Continuous infusion 5-FU 1200 mg/meter squared begin on day 1 x 46 hours  Avastin 5mg/kg, Day 1  Cycle is 14 days  goal = prolongation of life     2   Pain control issues  Patient states that pain is relatively controlled  Patient sees a pain       Patient is to return in 4 weeks   Mr Iram Rayo knows to call if he has any other oncology questions or concerns       Carefully review your medication list and verify that the list is accurate and up-to-date   Please call the hematology/oncology office if there are medications missing from the list, medications on the list that you are not currently taking or if there is a dosage or instruction that is different from how you're taking a medication        Hospital medications list:    baclofen 10 mg Oral BID   clopidogrel 75 mg Oral Daily   docusate sodium 100 mg Oral BID   famotidine 20 mg Oral Daily   glipiZIDE 10 mg Oral BID AC   heparin (porcine) 5,000 Units Subcutaneous Q8H [address]   insulin detemir 40 Units Subcutaneous HS   insulin lispro 1-6 Units Subcutaneous TID AC   nicotine 21 mg Transdermal Daily   PARoxetine 30 mg Oral HS   pravastatin 40 mg Oral Daily With Dinner   tamsulosin 0 4 mg Oral QAM   tiotropium 18 mcg Inhalation Daily     Physical exam  /73   Pulse (!) 110   Temp 97 9 °F (36 6 °C) (Oral)   Resp 20   Ht 5' 6" (1 676 m)   Wt 90 5 kg (199 lb 8 3 oz)   SpO2 94%   BMI 32 20 kg/m²   Constitutional: Well formed, well-nourished  in no apparent distress  Eyes: PERRL, conjunctiva orange, anicteric    HENT: Atraumatic, external ears normal, nose normal,    Oropharynx: moist, no pharyngeal exudates, no thrush, pink  Neck: limited range of motion, no adenopathy  Respiratory: scattered bilateral rhonchi, fair air entry bilaterally  Cardiovascular: Normal rate, normal rhythm, no murmurs, no gallops, no rubs    GI: Soft, + obese, + slightly distended, + diffusely tender but no rigidity or rebound, + bowel sounds  : No costovertebral angle tenderness, normal reproductive organs, no discharge  Musculoskeletal: + tenderness in thighs, calves, buttocks, rib cage bilaterally  Integument: Kim dry, pale, scattered ecchymoses and purpura, + second cubitus (covered)  Lymphatic: No adenopathy in the neck, supra-clavicular region, axilla and groin bilaterally  Extremities: no lower extremity edema, no cords, pulses are 1+  Neurologic: Alert & oriented x 3, CN 2-12 normal, normal motor function, normal sensory function, no focal deficits noted  Psychiatric: responsive, pleasant, appropriate, response time within normal limits  Rectal: Deferred    Laboratory test results    Results for Freida Hamman (MRN [id number]) as of 12/22/2017 16:15   Ref  Range 12/21/2017 05:24   WBC Latest Ref Range: 4 80 - 10 80 Thousand/uL 7 10   RBC Latest Ref Range: 4 70 - 6 10 Million/uL 3 85 (L)   Hemoglobin Latest Ref Range: 14 0 - 18 0 g/dL 11 2 (L)   Hematocrit Latest Ref Range: 42 0 - 52 0 % 34 5 (L)   MCV Latest Ref Range: 82 - 98 fL 90   MCH Latest Ref Range: 27 0 - 31 0 pg 29 2   MCHC Latest Ref Range: 31 4 - 37 4 g/dL 32 6   RDW Latest Ref Range: 11 6 - 15 1 % 14 9   Platelets Latest Ref Range: 130 - 400 Thousands/uL 312   MPV Latest Ref Range: 8 9 - 12 7 fL 6 7 (L)       Results for Freida Hamman (MRN [id number]) as of 12/22/2017 16:15   Ref  Range 5/19/2017 05:18 12/21/2017 05:24   CARCINOEMBRYONIC ANTIGEN Latest Ref Range: 0 0 - 3 0 ng/mL 90 2 (H) 29 8 (H)       Results for Freida Hamman (MRN [id number]) as of 12/22/2017 16:15   Ref   Range 12/21/2017 05:24   Sodium Latest Ref Range: 136 - 145 mmol/L 135 (L)   Potassium Latest Ref Range: 3 5 - 5 3 mmol/L 3 6   Chloride Latest Ref Range: 100 - 108 mmol/L 100   CO2 Latest Ref Range: 21 - 32 mmol/L 29   Anion Gap Latest Ref Range: 4 - 13 mmol/L 6   BUN Latest Ref Range: 5 - 25 mg/dL 14   Creatinine Latest Ref Range: 0 60 - 1 30 mg/dL 0 54 (L)   Glucose Latest Ref Range: 65 - 140 mg/dL 66   Calcium Latest Ref Range: 8 3 - 10 1 mg/dL 8 7   AST Latest Ref Range: 5 - 45 U/L 57 (H)   ALT Latest Ref Range: 12 - 78 U/L 125 (H)   Alkaline Phosphatase Latest Ref Range: 46 - 116 U/L 414 (H)   Total Protein Latest Ref Range: 6 4 - 8 2 g/dL 6 1 (L)   Albumin Latest Ref Range: 3 5 - 5 0 g/dL 2 1 (L)   Total Bilirubin Latest Ref Range: 0 20 - 1 00 mg/dL 0 30   eGFR Latest Units: ml/min/1 73sq m 117   Phosphorus Latest Ref Range: 2 7 - 4 5 mg/dL 3 6   Magnesium Latest Ref Range: 1 6 - 2 6 mg/dL 1 3 (L)       Prior blood work results     11/30/17 BUN = 15 creatinine = 0 81 calcium = 9 2 AST = 83 ALT = 145 total bilirubin = 0 40 alkaline phosphatase = 394    WBC = 11 5 hemoglobin = 13 hematocrit = 38 8 platelet = 326 neutrophil = 70%     7/27/17 WBC = 7 7 hemoglobin = 13 5 hematocrit = 40 platelet = 030 neutrophil = 50% BUN = 12 creatinine = 0 96 LFTs WNL  7/13/17 WBC = 6 2, hemoglobin = 13 1, hematocrit = 39 5, platelet count = 234, BUN = 11, creatinine = 0 95, calcium = 9 0, LFTs = WNL, alkaline phosphatase 251, urinalysis demonstrates trace ketones without proteinuria  6/19/17 WBC = 11 0, hemoglobin = 12 8, hematocrit = 37 9 0, platelet count = 221, BUN = 15, creatinine 0 74, calcium = 9 2, LFTs = WNL, alkaline phosphatase 248  6/6/17 WBC = 8 3 hemoglobin = 12 1 hematocrit = 36 9 platelet = 134 BUN = 11 creatinine = 0 77  5/19/17 BUN = 6 creatinine = 0 65 WBC = 8 3 hemoglobin = 12 9 hematocrit = 38 9 platelet = 709 CEA = 90 2  5/1/17 BUN = 19 creatinine = 0 98 calcium = 9 9 AST = 80 = high ALT = 139 = high alkaline phosphatase = 236 = high total protein = 7 7 total bilirubin = 0 50 WBC = 12 6 hemoglobin = 15 6 hematocrit = 48 MCV = 89 RDW = 12 9 platelet = 187 neutrophil = 64% lymphocyte = 24% monocyte = 9% eosinophil = 3%        Radiology reports     12/20/17 CT cervical spine  1   Stable degenerative changes   No cervical spine fracture or traumatic malalignment  2   Possible bony metastatic disease involving the clivus and C3 vertebral body, stable    3   Worsening pulmonary metastatic disease      12/20/17 CT of thoracic and lumbar spine  1   Metastatic disease of the L5 vertebral body   There is compression deformity of the posterior, superior endplate with bony retropulsion   This results in moderate central stenosis and severe bilateral lateral recess narrowing   The findings are suspicious for acute, pathologic compression fracture  2   Acute, nondisplaced fracture posterior aspect of the right 9th rib, likely through pathologic bone lesion   No evidence of pneumothorax  3   Acute, mildly displaced fracture through pathologic bone lesion involving the posterior medial aspect of the right 11th rib   No evidence of pneumothorax  4   Significant worsening of bony metastatic disease  5   Worsening pulmonary metastatic disease      12/20/17 MRI C-spine   Multiple metastatic lesions of the clivus, cervical spine and thoracic spine with marrow edema most prominent in the left side of the C5 vertebral body      12/20/17 MRI lumbar spine   Extensive diffuse metastatic disease throughout the lower thoracic spine, lumbar spine and imaged portions of the sacrum and bilateral tacho    Metastatic disease at L5 extends into the central canal and likely impinges multiple left-sided nerve roots   Critical central stenosis is not present       Prior scans     5/1/17 CTA/chest did not demonstrate any evidence of a pulmonary embolism  Patient had bilateral pulmonary nodules and worsening mediastinal and hilar adenopathy suspicious for metastatic disease  The largest lymph nodes measured 3 6 x 2 8 cm in the right paratracheal region  Patient had 2 0 x 2 1 cm lymph nodes in the right pericardiac region  5/1/17 CAT scan of the abdomen and pelvis demonstrated multiple low-density lesions throughout both lobes of the fatty liver including a 4 2 x 4 4 cm mass in the right hepatic dome and a 5 5 x 4 9 mass in the lateral segment of the left lobe  There was an ill-defined lobular mass at the base of the cecum concerning for primary malignancy with metastatic iliocolic and retroperitoneal adenopathy  There was an asymmetric enlargement and an ill definition of the left iliacus muscle concerning for retroperitoneal hematoma    5/1/17 CAT scan of the lumbar spine demonstrated mild inflammatory changes surrounding the prominent iliacus muscle, retroperitoneal hematoma suspected  Patient also had retroperitoneal adenopathy         Pathology      17 date of report 17 [employer] NGS KRAS [employer] mutation for K-seb p [id number]      17 liver, 6 cores demonstrated metastatic adenocarcinoma in the background of dense fibrosis and necrosis compatible with a colonic origin      17 large intestine, cecum demonstrated moderately differentiated adenocarcinoma  Mismatch repair protein panel demonstrated all genes to be expressed

## 2017-12-23 VITALS
DIASTOLIC BLOOD PRESSURE: 83 MMHG | HEIGHT: 66 IN | RESPIRATION RATE: 18 BRPM | WEIGHT: 199.52 LBS | HEART RATE: 111 BPM | BODY MASS INDEX: 32.06 KG/M2 | TEMPERATURE: 99.1 F | OXYGEN SATURATION: 93 % | SYSTOLIC BLOOD PRESSURE: 134 MMHG
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
## 2017-12-23 LAB
ALBUMIN SERPL BCP-MCNC: 2.1 G/DL (ref 3.5–5)
ALP SERPL-CCNC: 336 U/L (ref 46–116)
ALT SERPL W P-5'-P-CCNC: 79 U/L (ref 12–78)
ANION GAP SERPL CALCULATED.3IONS-SCNC: 10 MMOL/L (ref 4–13)
AST SERPL W P-5'-P-CCNC: 46 U/L (ref 5–45)
BILIRUB SERPL-MCNC: 0.3 MG/DL (ref 0.2–1)
BUN SERPL-MCNC: 7 MG/DL (ref 5–25)
CALCIUM SERPL-MCNC: 8.6 MG/DL (ref 8.3–10.1)
CHLORIDE SERPL-SCNC: 103 MMOL/L (ref 100–108)
CO2 SERPL-SCNC: 28 MMOL/L (ref 21–32)
CREAT SERPL-MCNC: 0.48 MG/DL (ref 0.6–1.3)
ERYTHROCYTE [DISTWIDTH] IN BLOOD BY AUTOMATED COUNT: 14.5 % (ref 11.6–15.1)
GFR SERPL CREATININE-BSD FRML MDRD: 123 ML/MIN/1.73SQ M
GLUCOSE SERPL-MCNC: 176 MG/DL (ref 65–140)
GLUCOSE SERPL-MCNC: 66 MG/DL (ref 65–140)
GLUCOSE SERPL-MCNC: 81 MG/DL (ref 65–140)
GLUCOSE SERPL-MCNC: 89 MG/DL (ref 65–140)
HCT VFR BLD AUTO: 32.8 % (ref 42–52)
HGB BLD-MCNC: 10.7 G/DL (ref 14–18)
MAGNESIUM SERPL-MCNC: 1.1 MG/DL (ref 1.6–2.6)
MCH RBC QN AUTO: 29 PG (ref 27–31)
MCHC RBC AUTO-ENTMCNC: 32.5 G/DL (ref 31.4–37.4)
MCV RBC AUTO: 89 FL (ref 82–98)
PHOSPHATE SERPL-MCNC: 3.5 MG/DL (ref 2.7–4.5)
PLATELET # BLD AUTO: 295 THOUSANDS/UL (ref 130–400)
PMV BLD AUTO: 7.3 FL (ref 8.9–12.7)
POTASSIUM SERPL-SCNC: 4.1 MMOL/L (ref 3.5–5.3)
PROT SERPL-MCNC: 5.8 G/DL (ref 6.4–8.2)
RBC # BLD AUTO: 3.69 MILLION/UL (ref 4.7–6.1)
SODIUM SERPL-SCNC: 141 MMOL/L (ref 136–145)
WBC # BLD AUTO: 7.8 THOUSAND/UL (ref 4.8–10.8)

## 2017-12-23 PROCEDURE — 82948 REAGENT STRIP/BLOOD GLUCOSE: CPT

## 2017-12-23 PROCEDURE — 84100 ASSAY OF PHOSPHORUS: CPT | Performed by: STUDENT IN AN ORGANIZED HEALTH CARE EDUCATION/TRAINING PROGRAM

## 2017-12-23 PROCEDURE — 97110 THERAPEUTIC EXERCISES: CPT

## 2017-12-23 PROCEDURE — 80053 COMPREHEN METABOLIC PANEL: CPT | Performed by: STUDENT IN AN ORGANIZED HEALTH CARE EDUCATION/TRAINING PROGRAM

## 2017-12-23 PROCEDURE — 83735 ASSAY OF MAGNESIUM: CPT | Performed by: STUDENT IN AN ORGANIZED HEALTH CARE EDUCATION/TRAINING PROGRAM

## 2017-12-23 PROCEDURE — 85027 COMPLETE CBC AUTOMATED: CPT | Performed by: STUDENT IN AN ORGANIZED HEALTH CARE EDUCATION/TRAINING PROGRAM

## 2017-12-23 RX ORDER — HYDROMORPHONE HYDROCHLORIDE 2 MG/1
2 TABLET ORAL EVERY 4 HOURS PRN
Qty: 60 TABLET | Refills: 0 | Status: ON HOLD
Start: 2017-12-23 | End: 2018-01-04

## 2017-12-23 RX ORDER — FENTANYL 75 UG/H
1 PATCH TRANSDERMAL
Qty: 20 PATCH | Refills: 0
Start: 2017-12-23 | End: 2018-02-21

## 2017-12-23 RX ORDER — MAGNESIUM SULFATE HEPTAHYDRATE 40 MG/ML
4 INJECTION, SOLUTION INTRAVENOUS ONCE
Status: COMPLETED | OUTPATIENT
Start: 2017-12-23 | End: 2017-12-23

## 2017-12-23 RX ADMIN — DOCUSATE SODIUM 100 MG: 100 CAPSULE, LIQUID FILLED ORAL at 09:48

## 2017-12-23 RX ADMIN — HYDROMORPHONE HYDROCHLORIDE 2 MG: 2 INJECTION, SOLUTION INTRAMUSCULAR; INTRAVENOUS; SUBCUTANEOUS at 12:56

## 2017-12-23 RX ADMIN — HEPARIN SODIUM 5000 UNITS: 5000 INJECTION, SOLUTION INTRAVENOUS; SUBCUTANEOUS at 14:43

## 2017-12-23 RX ADMIN — HYDROMORPHONE HYDROCHLORIDE 2 MG: 2 INJECTION, SOLUTION INTRAMUSCULAR; INTRAVENOUS; SUBCUTANEOUS at 01:39

## 2017-12-23 RX ADMIN — NICOTINE 21 MG: 21 PATCH, EXTENDED RELEASE TRANSDERMAL at 09:48

## 2017-12-23 RX ADMIN — HYDROMORPHONE HYDROCHLORIDE 2 MG: 2 INJECTION, SOLUTION INTRAMUSCULAR; INTRAVENOUS; SUBCUTANEOUS at 09:44

## 2017-12-23 RX ADMIN — TAMSULOSIN HYDROCHLORIDE 0.4 MG: 0.4 CAPSULE ORAL at 09:47

## 2017-12-23 RX ADMIN — HYDROMORPHONE HYDROCHLORIDE 2 MG: 2 INJECTION, SOLUTION INTRAMUSCULAR; INTRAVENOUS; SUBCUTANEOUS at 06:18

## 2017-12-23 RX ADMIN — GLIPIZIDE 10 MG: 5 TABLET ORAL at 16:21

## 2017-12-23 RX ADMIN — FAMOTIDINE 20 MG: 20 TABLET, FILM COATED ORAL at 09:47

## 2017-12-23 RX ADMIN — MAGNESIUM SULFATE HEPTAHYDRATE 4 G: 40 INJECTION, SOLUTION INTRAVENOUS at 14:33

## 2017-12-23 RX ADMIN — PRAVASTATIN SODIUM 40 MG: 40 TABLET ORAL at 16:21

## 2017-12-23 RX ADMIN — BACLOFEN 10 MG: 10 TABLET ORAL at 09:46

## 2017-12-23 RX ADMIN — GLIPIZIDE 10 MG: 5 TABLET ORAL at 06:18

## 2017-12-23 RX ADMIN — HEPARIN SODIUM 5000 UNITS: 5000 INJECTION, SOLUTION INTRAVENOUS; SUBCUTANEOUS at 06:18

## 2017-12-23 RX ADMIN — TIOTROPIUM BROMIDE 18 MCG: 18 CAPSULE ORAL; RESPIRATORY (INHALATION) at 09:48

## 2017-12-23 RX ADMIN — HYDROMORPHONE HYDROCHLORIDE 2 MG: 2 INJECTION, SOLUTION INTRAMUSCULAR; INTRAVENOUS; SUBCUTANEOUS at 16:21

## 2017-12-23 RX ADMIN — CLOPIDOGREL BISULFATE 75 MG: 75 TABLET ORAL at 09:47

## 2017-12-23 NOTE — DISCHARGE SUMMARY
[facility] - Medical Ming Ding 65 y o  male MRN: [id number]  [address]  Tariq Castaneda 19 / Bed: 1027 [facility] Encounter: [id number]    BRIEF OVERVIEW    Admitting Provider: Cathleen Blackmon DO  Discharge Provider: Dominga Leslie MD    Discharge To: [address] [place], [place] [address]d: None    Outpatient Follow-Up:  [place] [id number]    Things to address at first follow up visit:  Metastasis of Colon Cancer    Labs and results pending at discharge:  None    Admission Date: 12/20/2017     Discharge Date: 12/23/17    Primary Discharge Diagnosis  Principal Problem:    Metastasis from colon cancer [facility])  Active Problems:    Hypertension    Hyperlipidemia    Diabetes mellitus ([address] )    CAD (coronary artery disease)    COPD (chronic obstructive pulmonary disease) (HCC)    CARLOS (obstructive sleep apnea)    Anxiety and depression    BPH (benign prostatic hyperplasia)    Spinal stenosis    GERD (gastroesophageal reflux disease)    Tobacco use    Multiple lung nodules on CT    Colon cancer [facility])  Resolved Problems:    Fall    Consulting Providers:  Consult to Orthopedic Surgery   Consult to Hematology/Oncology        [address] [place] [address]      HPI:  (as per H&P)  · Driss Garcia is a 65year old male with a PMH of PMH of Hypertension, Hyperlipidemia , COPD, CARLOS, CAD, BPH, Depression with Anxiety, Spinal Stenosis, and Metastatic disease of the Colon who presents to the ED with a chief complaint of lower extremity pain s/p fall  As per patient, around 1:30 yesterday, he was trying to get into his wheelchair, slipped and fell on his back  Prior to the fall, patient was experiencing dizziness, lightheadedness, and sweating  He mentions losing consciousness, but is unsure  Fall was unwitnessed  Mentions no loss of urine/bowel function, tongue biting, seizure-like activity, aura, visual changes, palpitations, chest pain, and shortness of breath  Does note trouble with balance , and lower bilateral weakness and says that he feels like his "legs gave out"  He mentions being on the ground for almost 4 hours with a small table on top of him  He states that he dragged himself to a nearby phone where he was able to call his neighbor who called paramedics on his behalf  Reports previous kind of fall 2 weeks ago when he was at a follow-up appointment with his Hematologist/Oncologist - Dr Cedric Rg  Further notes bilateral lower extremity leg pain, which he says is attributed to his chronic lumbar spinal stenosis  Notes worsening pain on the left extremity than on the right  There are no exacerbating or alleviating factors  Denies feelings of warmth, tenderness, and swelling  Pain has been controlled with pain medications  Regarding his metastatic cancer of the colon, patient says that his last treatment was about 2 months ago and he has finished 10 chemotherapy treatments so far  Patient lives alone and has not eaten or drank anything in 2 days  ED Course:  Patient arrived in the ED  CK-MB and total CK were obtained  CT of cervical spine and CT of thoracic/lumbar spine were obtained  Dilaudid and morphine were used for pain control  Patient was stabilized and moved to medical floor  Hospital Course:    Fall: Likely 2/2 Mechanical vs  Cardiogenic vs  Neurogenic  Baseline as per patient: Difficulty with ambulation; uses a wheelchair and cane; reports difficulty with balance  Was monitored with Telemetry and Neurochecks q4h       Leg Pain: Likely 2/2 Metastatic Disease vs  Spinal Stenosis vs  Cauda Equina Syndrome  CK-MB on admission: 0 6, Total Ck: 204  Pain Control: Dilaudid 4mg IV q6h  Consult to Orthopedic Surgery:  No concern for cauda equina syndrome  Consult to Hematology/Oncology: Pain Management  Imaging results below  Advised to follow up with PCP and [facility] upon discharge      MRI (Thoracic/Lumbar):   Extensive diffuse metastatic disease throughout the lower thoracic spine, lumbar spine and imaged portions of the sacrum and  bilateral tacho   Metastatic disease at L5 extends into the central canal and likely impinges multiple left-sided nerve roots  , Critical central stenosis is  not present  CT (Thoracic/Lumbar) Spine on admission:   Metastatic Disease of the L5 vertebral body   Acute, nondisplaced fracture posterior aspect of the 9th rib, likely to pathologic bone lesion   Acute mildly displaced fracture through pathologic bone lesion involving the posterior medial aspect of the right 11th rib   Significant worsening of Bony Metastatic Disease   Worsening Pulmonary Metastatic Disease  CT (Cervical Spine) on admission:   Stable degenerative changes, no cervical spine fracture or traumatic malalignment   Possible bony Metastatic Disease involving the clivus and T3 vertebral body, stable   Worsening pulmonary metastatic disease    DEBBIE - Resolved: Likely 2/2 Multifactorial (Dehydration and Rhabdomyolysis)  Baseline Cr: 0 74-0 95  Current Cr at discharge : 0 48  Started on IV NS 125cc/hr  Was monitored with daily CMP     Leukocytosis - Resolved: 2/2 Reactive vs  Infectious  WBC on admission: 11 6  Current WBC at dischagre: 7 8  Was monitored with daily CBC     Hyponatremia: Likely 2/2 Volume Depletion  Na on admission: 132  Current Na at discharge: 141  Started on IV NS 125cc/hr  Was monitored with daily BMP    Hypotension - Resolved: Likely 2/2 Dehydration  Resolved during hospital course  Patient was continued on Home Medications: Lisinopril and Metoprolol  Was monitored with daily vitals     Hx of Metastatic Colon Cancer: Metastasis to Lung and Liver and Bone based on imaging  Hematology/Oncology consulted  Recommended Pain Management given stage of cancer progression     Hyperlipidemia: Stable on and throughout admission; patient was continued on home medication:  Lovastatin     CAD: s/p PCIx2; Cardiac Cath on 1/13/116: No evidence of CAD   Stable; patient was continued on home medication: ASA 81 and Plavix 75mg     COPD: Stable on an throughout admission; patient was continued on home medication:  Spiriva and ProAir     CARLOS: Stable on and throughout admission; patient was continued with CPAP 16     BPH: Stable on and throughout admission; patient was continued on home medication: Flomax     Depression/Anxiety: Stable on and throughout admission; patient was continued on home medication: Paxil     Tobacco Dependence: Placed on Nicoderm 21mg patch  Counseled on Smoking Cessation      Physical Exam at Discharge:  General appearance: alert, cooperative and no distress  Head: Normocephalic, without obvious abnormality, atraumatic  Eyes: conjunctivae/corneas clear  PERRL, EOM's intact  Fundi benign  Nose: Nares normal  Septum midline  Mucosa normal  No drainage or sinus tenderness  Throat: lips, mucosa, and tongue normal; teeth and gums normal  Neck: no adenopathy, no carotid bruit, no JVD, supple, symmetrical, trachea midline and thyroid not enlarged, symmetric, no tenderness/mass/nodules  Back: Skin Lesion  Lungs: clear to auscultation bilaterally  Heart: regular rate and rhythm, S1, S2 normal, no murmur, click, rub or gallop  Abdomen: soft, non-tender; bowel sounds normal; no masses,  no organomegaly  Extremities: extremities normal, atraumatic, no cyanosis or edema  Pulses: 2+ and symmetric  Neurologic: CN 2-12; Left LE: 1/5 Motor w  Intact Sensation, Right LE: 3/5 Motor w   Decreased Sensation; Limited ROM    Procedures Performed/Pertinent Test results:  Results for orders placed or performed during the hospital encounter of 12/20/17   MRSA culture   Result Value Ref Range    MRSA Culture Only       No Methicillin Resistant Staphlyococcus aureus (MRSA) isolated   CK Total with Reflex CKMB   Result Value Ref Range    Total  39 - 308 U/L   CBC and differential   Result Value Ref Range    WBC 11 60 (H) 4 80 - 10 80 Thousand/uL    RBC 4 28 (L) 4 70 - 6 10 Million/uL    Hemoglobin 12 7 (L) 14 0 - 18 0 g/dL    Hematocrit 38 2 (L) 42 0 - 52 0 %    MCV 89 82 - 98 fL    MCH 29 7 27 0 - 31 0 pg    MCHC 33 2 31 4 - 37 4 g/dL    RDW 15 0 11 6 - 15 1 %    MPV 7 1 (L) 8 9 - 12 7 fL    Platelets 328 (H) 085 - 400 Thousands/uL    nRBC 0 /100 WBCs    Neutrophils Relative 78 (H) 43 - 75 %    Lymphocytes Relative 12 (L) 14 - 44 %    Monocytes Relative 8 4 - 12 %    Eosinophils Relative 1 0 - 6 %    Basophils Relative 0 0 - 1 %    Neutrophils Absolute 9 10 (H) 1 85 - 7 62 Thousands/µL    Lymphocytes Absolute 1 40 0 60 - 4 47 Thousands/µL    Monocytes Absolute 1 00 0 17 - 1 22 Thousand/µL    Eosinophils Absolute 0 10 0 00 - 0 61 Thousand/µL    Basophils Absolute 0 00 0 00 - 0 10 Thousands/µL   Comprehensive metabolic panel   Result Value Ref Range    Sodium 132 (L) 136 - 145 mmol/L    Potassium 4 2 3 5 - 5 3 mmol/L    Chloride 94 (L) 100 - 108 mmol/L    CO2 25 21 - 32 mmol/L    Anion Gap 13 4 - 13 mmol/L    BUN 25 5 - 25 mg/dL    Creatinine 1 44 (H) 0 60 - 1 30 mg/dL    Glucose 265 (H) 65 - 140 mg/dL    Calcium 9 8 8 3 - 10 1 mg/dL    AST 87 (H) 5 - 45 U/L     (H) 12 - 78 U/L    Alkaline Phosphatase 543 (H) 46 - 116 U/L    Total Protein 7 0 6 4 - 8 2 g/dL    Albumin 2 4 (L) 3 5 - 5 0 g/dL    Total Bilirubin 0 40 0 20 - 1 00 mg/dL    eGFR 54 ml/min/1 73sq m   Protime-INR   Result Value Ref Range    Protime 12 4 (H) 9 4 - 11 7 seconds    INR 1 18 (H) 0 86 - 1 16   APTT   Result Value Ref Range    PTT 27 24 - 33 seconds   Troponin I   Result Value Ref Range    Troponin I <0 02 <=0 04 ng/mL   UA w Reflex to Microscopic w Reflex to Culture   Result Value Ref Range    Color, UA [place]     Clarity, UA Slightly Cloudy     Specific [place], UA 1 015 1 000 - 1 030    pH, UA 5 5 5 0 - 9 0    Leukocytes, UA Negative Negative    Nitrite, UA Negative Negative    Protein, UA 30 (1+) (A) Negative mg/dl    Glucose,  (1/10%) (A) Negative mg/dl    Ketones, UA Trace (A) Negative mg/dl Urobilinogen, UA 1 0 0 2, 1 0 E U /dl E U /dl    Bilirubin, UA Interference- unable to analyze (A) Negative    Blood, UA Negative Negative   CKMB   Result Value Ref Range    CK-MB Index <1 0 0 0 - 2 5 %    CK-MB FRACTION 0 6 0 0 - 5 0 ng/mL   Lactic acid, plasma   Result Value Ref Range    LACTIC ACID 2 0 0 5 - 2 0 mmol/L   Urine Microscopic   Result Value Ref Range    RBC, UA 0-1 (A) None Seen, 0-5 /hpf    WBC, UA 4-10 (A) None Seen, 0-5, 5-55, 5-65 /hpf    Epithelial Cells Occasional None Seen, Occasional /hpf    Bacteria, UA None Seen None Seen, Occasional /hpf    Hyaline Casts, UA 10-20 (A) (none) /lpf    Fine granular casts 1-2 /lpf    COARSE GRANULAR CASTS 0-1 /lpf    OTHER OBSERVATIONS Sperm Present     MUCOUS THREADS Innumerable Occasional, Moderate, Innumerable   Platelet count   Result Value Ref Range    Platelets 734 720 - 838 Thousands/uL    MPV 6 7 (L) 8 9 - 12 7 fL   Comprehensive metabolic panel   Result Value Ref Range    Sodium 135 (L) 136 - 145 mmol/L    Potassium 3 6 3 5 - 5 3 mmol/L    Chloride 100 100 - 108 mmol/L    CO2 29 21 - 32 mmol/L    Anion Gap 6 4 - 13 mmol/L    BUN 14 5 - 25 mg/dL    Creatinine 0 54 (L) 0 60 - 1 30 mg/dL    Glucose 66 65 - 140 mg/dL    Calcium 8 7 8 3 - 10 1 mg/dL    AST 57 (H) 5 - 45 U/L     (H) 12 - 78 U/L    Alkaline Phosphatase 414 (H) 46 - 116 U/L    Total Protein 6 1 (L) 6 4 - 8 2 g/dL    Albumin 2 1 (L) 3 5 - 5 0 g/dL    Total Bilirubin 0 30 0 20 - 1 00 mg/dL    eGFR 117 ml/min/1 73sq m   Magnesium   Result Value Ref Range    Magnesium 1 3 (L) 1 6 - 2 6 mg/dL   Phosphorus   Result Value Ref Range    Phosphorus 3 6 2 7 - 4 5 mg/dL   CBC (With Platelets)   Result Value Ref Range    WBC 7 10 4 80 - 10 80 Thousand/uL    RBC 3 85 (L) 4 70 - 6 10 Million/uL    Hemoglobin 11 2 (L) 14 0 - 18 0 g/dL    Hematocrit 34 5 (L) 42 0 - 52 0 %    MCV 90 82 - 98 fL    MCH 29 2 27 0 - 31 0 pg    MCHC 32 6 31 4 - 37 4 g/dL    RDW 14 9 11 6 - 15 1 %    Platelets 497 608 - 786 Thousands/uL    MPV 6 7 (L) 8 9 - 12 7 fL   CEA   Result Value Ref Range    CEA 29 8 (H) 0 0 - 3 0 ng/mL   Comprehensive metabolic panel   Result Value Ref Range    Sodium 140 136 - 145 mmol/L    Potassium 4 2 3 5 - 5 3 mmol/L    Chloride 104 100 - 108 mmol/L    CO2 28 21 - 32 mmol/L    Anion Gap 8 4 - 13 mmol/L    BUN 8 5 - 25 mg/dL    Creatinine 0 47 (L) 0 60 - 1 30 mg/dL    Glucose 44 (L) 65 - 140 mg/dL    Calcium 8 7 8 3 - 10 1 mg/dL    AST 50 (H) 5 - 45 U/L    ALT 99 (H) 12 - 78 U/L    Alkaline Phosphatase 370 (H) 46 - 116 U/L    Total Protein 6 0 (L) 6 4 - 8 2 g/dL    Albumin 2 1 (L) 3 5 - 5 0 g/dL    Total Bilirubin 0 20 0 20 - 1 00 mg/dL    eGFR 124 ml/min/1 73sq m   Magnesium   Result Value Ref Range    Magnesium 1 1 (L) 1 6 - 2 6 mg/dL   Phosphorus   Result Value Ref Range    Phosphorus 3 2 2 7 - 4 5 mg/dL   CBC (With Platelets)   Result Value Ref Range    WBC 7 40 4 80 - 10 80 Thousand/uL    RBC 3 64 (L) 4 70 - 6 10 Million/uL    Hemoglobin 10 7 (L) 14 0 - 18 0 g/dL    Hematocrit 32 1 (L) 42 0 - 52 0 %    MCV 88 82 - 98 fL    MCH 29 5 27 0 - 31 0 pg    MCHC 33 4 31 4 - 37 4 g/dL    RDW 14 5 11 6 - 15 1 %    Platelets 317 135 - 027 Thousands/uL    MPV 6 9 (L) 8 9 - 12 7 fL   Comprehensive metabolic panel   Result Value Ref Range    Sodium 141 136 - 145 mmol/L    Potassium 4 1 3 5 - 5 3 mmol/L    Chloride 103 100 - 108 mmol/L    CO2 28 21 - 32 mmol/L    Anion Gap 10 4 - 13 mmol/L    BUN 7 5 - 25 mg/dL    Creatinine 0 48 (L) 0 60 - 1 30 mg/dL    Glucose 66 65 - 140 mg/dL    Calcium 8 6 8 3 - 10 1 mg/dL    AST 46 (H) 5 - 45 U/L    ALT 79 (H) 12 - 78 U/L    Alkaline Phosphatase 336 (H) 46 - 116 U/L    Total Protein 5 8 (L) 6 4 - 8 2 g/dL    Albumin 2 1 (L) 3 5 - 5 0 g/dL    Total Bilirubin 0 30 0 20 - 1 00 mg/dL    eGFR 123 ml/min/1 73sq m   Magnesium   Result Value Ref Range    Magnesium 1 1 (L) 1 6 - 2 6 mg/dL   Phosphorus   Result Value Ref Range    Phosphorus 3 5 2 7 - 4 5 mg/dL   CBC (With Platelets) Result Value Ref Range    WBC 7 80 4 80 - 10 80 Thousand/uL    RBC 3 69 (L) 4 70 - 6 10 Million/uL    Hemoglobin 10 7 (L) 14 0 - 18 0 g/dL    Hematocrit 32 8 (L) 42 0 - 52 0 %    MCV 89 82 - 98 fL    MCH 29 0 27 0 - 31 0 pg    MCHC 32 5 31 4 - 37 4 g/dL    RDW 14 5 11 6 - 15 1 %    Platelets 422 459 - 048 Thousands/uL    MPV 7 3 (L) 8 9 - 12 7 fL   ECG 12 lead   Result Value Ref Range    Ventricular Rate 107 BPM    Atrial Rate 107 BPM    ID Interval 118 ms    QRSD Interval 92 ms    QT Interval 348 ms    QTC Interval 464 ms    P [place] 69 degrees    QRS Axis 62 degrees    T Wave Axis 70 degrees   Fingerstick Glucose (POCT)   Result Value Ref Range    POC Glucose 276 (H) 65 - 140 mg/dl   Fingerstick Glucose (POCT)   Result Value Ref Range    POC Glucose 62 (L) 65 - 140 mg/dl   Fingerstick Glucose (POCT)   Result Value Ref Range    POC Glucose 171 (H) 65 - 140 mg/dl   Fingerstick Glucose (POCT)   Result Value Ref Range    POC Glucose 186 (H) 65 - 140 mg/dl   Fingerstick Glucose (POCT)   Result Value Ref Range    POC Glucose 50 (L) 65 - 140 mg/dl   Fingerstick Glucose (POCT)   Result Value Ref Range    POC Glucose 130 65 - 140 mg/dl   Fingerstick Glucose (POCT)   Result Value Ref Range    POC Glucose 196 (H) 65 - 140 mg/dl   Fingerstick Glucose (POCT)   Result Value Ref Range    POC Glucose 179 (H) 65 - 140 mg/dl   Fingerstick Glucose (POCT)   Result Value Ref Range    POC Glucose 286 (H) 65 - 140 mg/dl   Fingerstick Glucose (POCT)   Result Value Ref Range    POC Glucose 42 (L) 65 - 140 mg/dl   Fingerstick Glucose (POCT)   Result Value Ref Range    POC Glucose 67 65 - 140 mg/dl   Fingerstick Glucose (POCT)   Result Value Ref Range    POC Glucose 86 65 - 140 mg/dl   Fingerstick Glucose (POCT)   Result Value Ref Range    POC Glucose 63 (L) 65 - 140 mg/dl   Fingerstick Glucose (POCT)   Result Value Ref Range    POC Glucose 87 65 - 140 mg/dl   Fingerstick Glucose (POCT)   Result Value Ref Range    POC Glucose 140 65 - 140 mg/dl   Fingerstick Glucose (POCT)   Result Value Ref Range    POC Glucose 108 65 - 140 mg/dl   Fingerstick Glucose (POCT)   Result Value Ref Range    POC Glucose 81 65 - 140 mg/dl   Fingerstick Glucose (POCT)   Result Value Ref Range    POC Glucose 89 65 - 140 mg/dl   Smear Review(Phlebs Do Not Order)   Result Value Ref Range    Toxic Granulation Present     Platelet Estimate Increased (A) Adequate       MRI lumbar spine wo contrast   Final Result      Extensive diffuse metastatic disease throughout the lower thoracic spine, lumbar spine and imaged portions of the sacrum and bilateral tacho  Metastatic disease at L5 extends into the central canal and likely impinges multiple left-sided nerve roots  Critical central stenosis is not present  Workstation performed: [id number]         MRI cervical spine wo contrast   Final Result      Multiple metastatic lesions of the clivus, cervical spine and thoracic spine with marrow edema most prominent in the left side of the C5 vertebral body  Workstation performed: [id number]         CT spine thoracic & lumbar wo contrast   Final Result   1  Metastatic disease of the L5 vertebral body  There is compression deformity of the posterior, superior endplate with bony retropulsion  This results in moderate central stenosis and severe bilateral lateral recess narrowing  The findings are    suspicious for acute, pathologic compression fracture  2   Acute, nondisplaced fracture posterior aspect of the right 9th rib, likely through pathologic bone lesion  No evidence of pneumothorax  3   Acute, mildly displaced fracture through pathologic bone lesion involving the posterior medial aspect of the right 11th rib  No evidence of pneumothorax  4   Significant worsening of bony metastatic disease  5   Worsening pulmonary metastatic disease        The major findings are in agreement with the preliminary report provided by [employer] which was provided shortly after completion of the exam  The additional finding of  abnormal appearance of the L5 vertebral body as described above,   will now    be communicated with patient's clinical team              I personally discussed this study with Brent Samuels on 12/20/2017 6:50 AM          Workstation performed: [id number]         CT cervical spine without contrast   Final Result   1  Stable degenerative changes  No cervical spine fracture or traumatic malalignment  2   Possible bony metastatic disease involving the clivus and C3 vertebral body, stable  3   Worsening pulmonary metastatic disease  The major findings are in agreement with the preliminary report provided by [employer] which was provided shortly after completion of the exam  The additional finding of  worsening pulmonary metastatic disease,   will now be communicated with    patient's clinical team by our radiology liaison  Workstation performed: [id number]             Medications: Your Medications   Your Medications     Morning Afternoon Evening Bedtime As Needed    * albuterol (2 5 mg/3 mL) 0 083 % nebulizer solution   Albuterol Sulfate (2 5 MG/3ML) 0 083% Inhalation Nebulization Solution USE 1 UNIT DOSE EVERY 4-6 HOURS AS NEEDED FOR WHEEZING   Quantity: 1; Refills: 3 Yessica CHAMORRO ; Started 7-Feb-2014 Active3 ML Angela   Refills: 0                    * PROAIR HFA 90 mcg/act inhaler   Generic drug: albuterol   1 puff 2 (two) times a day ProAir  (90 Base) MCG/ACT Inhalation Aerosol Solution INHALE ONE TO TWO PUFFS BY MOUTH EVERY 4 TO 6 HOURS AS NEEDED Quantity: 18;  Refills: 5 Kendra CHAMORRO ; Started 16-Oct-2014 Active   Refills: 0                    baclofen 10 mg tablet   Take 10 mg by mouth 2 (two) times a day   Refills: 0                    clindamycin 300 MG capsule   Commonly known as: CLEOCIN   Take 300 mg by mouth 3 (three) times a day   Refills: 0                    clopidogrel 75 mg tablet   Commonly known as: PLAVIX   Take 75 mg by mouth daily   Refills: 0                    famotidine 40 MG tablet   Commonly known as: PEPCID   Take 40 mg by mouth daily   Refills: 0                    fentaNYL 75 mcg/hr   Commonly known as: [address] 1 patch on the skin every third day for 60 days Max Daily Amount: 1 patch   Refills: 0                    FLOMAX 0 4 mg   Generic drug: tamsulosin   For: Benign Enlargement of Prostate   Take 0 4 mg by mouth every morning   Refills: 0                    glipiZIDE 10 mg tablet   Commonly known as: GLUCOTROL   GlipiZIDE 10 MG Oral Tablet TAKE TWO TABLETS BY MOUTH TWICE DAILY Quantity: 120; Refills: 5 Dennie Roup M D ; Started 23-June-2015 Active   Refills: 0                    HYDROmorphone 2 mg tablet   Commonly known as: DILAUDID   Take 1 tablet by mouth every 4 (four) hours as needed for severe pain for up to 30 days Max Daily Amount: 12 mg   Refills: 0                    isosorbide mononitrate 30 mg 24 hr tablet   Commonly known as: IMDUR   Take 1 tablet by mouth daily for 30 days   Refills: 0   What changed: when to take this                    LEVEMIR FLEXPEN subcutaneous injection pen 100 units/mL   Generic drug: insulin detemir   40 Units daily at bedtime Levemir FlexPen 100 UNIT/ML SOPN USE AS DIRECTED  INJECT 40 UNITS SUBCUTANEOUSLY AT BEDTIME Quantity: 1; Refills: 5 Lula Carnes ; Started 27-Aug-2014 Active3 ML Pen (5 Pens)   Refills: 0                    lisinopril 10 mg tablet   Commonly known as: ZESTRIL   Take 10 mg by mouth every morning   Refills: 0                    lovastatin 40 MG tablet   Commonly known as: MEVACOR   Take 40 mg by mouth daily at bedtime   Refills: 0                    metFORMIN 500 mg tablet   Commonly known as: GLUCOPHAGE   Take 500 mg by mouth 2 (two) times a day with meals     Refills: 0                    metoprolol tartrate 25 mg tablet   Commonly known as: LOPRESSOR   Take 25 mg by mouth every morning Refills: 0                    oxyCODONE 5 MG capsule   Commonly known as: OXY-IR   Take 1 5 tablets every 6 hours as needed  Refills: 0   What changed:   0 how much to take   0 when to take this   0 reasons to take this   0 additional instructions                    PARoxetine 30 mg tablet   Commonly known as: PAXIL   30 mg daily at bedtime PARoxetine HCl - 30 MG Oral Tablet TAKE ONE TABLET (30MG) BY MOUTH ONCE DAILY Quantity: 30; Refills: 5 Cy ARREDONDO ; Started 5-Dec-2014 Active   Refills: 0                    RELION SHORT PEN NEEDLES 31G X 8 MM Misc   Generic drug: Insulin Pen Needle   ReliOn Short Pen Needles 31G X 8 MM Miscellaneous USE AS DIRECTED ONCE DAILY AT BEDTIME Quantity: 50; Refills: 1 Buffy CHAMORRO ; Started 18-Feb-2014 Active   Refills: 0                    * SURE COMFORT LANCETS 30G Misc   Sure Comfort Lancets 30G Miscellaneous USE AS DIRECTED  Quantity: 1; Refills: 0 Guillermo CHAMORRO ; [facility]   Refills: 0                    * CANDIDO MICROLET LANCETS lancets   Candido Microlet Lancets Miscellaneous USE AS DIRECTED  Quantity: 1; Refills: 6 Keagan CHAMORRO ; Started 14-Feb-2014 [id number] EA Package   Refills: 0                    tiotropium 18 mcg inhalation capsule   Commonly known as: SPIRIVA   Place 18 mcg into inhaler and inhale daily   Refills: 0                    Tobramycin 28 MG Caps   Take by mouth 3 (three) times a day   Refills: 0             Allergies: Allergies   Allergen Reactions    Tobramycin Rash    Penicillins Hives    Latex Rash       Diet restrictions: none  Activity restrictions: none  Code Status: Level 1 - Full Code    Discharge Condition: stable      Discharge Statement:  I spent 30 minutes discharging the patient  This time was spent on the day of discharge  I had direct contact with the patient on the day of discharge  Additional documentation is required if more than 30 minutes were spent on discharge             Lamar Daniel MD

## 2017-12-23 NOTE — DISCHARGE INSTRUCTIONS
Back Pain   WHAT YOU NEED TO KNOW:   Back pain is common  It can be caused by many conditions, such as arthritis or the breakdown of spinal discs  Your risk for back pain is increased by injuries, lack of activity, or repeated bending and twisting  You may feel sore or stiff on one or both sides of your back  The pain may spread to your buttocks or thighs  DISCHARGE INSTRUCTIONS:   Medicines:   · NSAIDs  help decrease swelling and pain  This medicine is available with or without a doctor's order  NSAIDs can cause stomach bleeding or kidney problems in certain people  If you take blood thinner medicine, always ask your healthcare provider if NSAIDs are safe for you  Always read the medicine label and follow directions  · Acetaminophen  decreases pain  It is available without a doctor's order  Ask how much to take and how often to take it  Follow directions  Acetaminophen can cause liver damage if not taken correctly  · Prescription pain medicine  may be given  Ask your healthcare provider how to take this medicine safely  · Take your medicine as directed  Contact your healthcare provider if you think your medicine is not helping or if you have side effects  Tell him or her if you are allergic to any medicine  Keep a list of the medicines, vitamins, and herbs you take  Include the amounts, and when and why you take them  Bring the list or the pill bottles to follow-up visits  Carry your medicine list with you in case of an emergency  Follow up with your healthcare provider in 2 weeks, or as directed:  Write down your questions so you remember to ask them during your visits  How to manage your back pain:   · Apply ice  on your back or affected area for 15 to 20 minutes every hour or as directed  Use an ice pack, or put crushed ice in a plastic bag  Cover it with a towel  Ice helps prevent tissue damage and decreases pain      · Apply heat  on your back or affected area for 20 to 30 minutes every 2 hours for as many days as directed  Heat helps decrease pain and muscle spasms  · Stay active  as much as you can without causing more pain  Bed rest could make your back pain worse  Avoid heavy lifting until your pain is gone  Return to the emergency department if:   · You have pain, numbness, or weakness in one or both legs  · Your pain becomes so severe that you cannot walk  · You cannot control your urine or bowel movements  · You have severe back pain with chest pain  · You have severe back pain, nausea, and vomiting  · You have severe back pain that spreads to your side or genital area  Contact your healthcare provider if:   · You have back pain that does not get better with rest and pain medicine  · You have a fever  · You have pain that worsens when you are on your back or when you rest     · You have pain that worsens when you cough or sneeze  · You lose weight without trying  · You have questions or concerns about your condition or care  © 2017 [address] [employer] Information is for End User's use only and may not be sold, redistributed or otherwise used for commercial purposes  All illustrations and images included in CareNotes® are the copyrighted property of [employer]  or [employer]  The above information is an  only  It is not intended as medical advice for individual conditions or treatments  Talk to your doctor, nurse or pharmacist before following any medical regimen to see if it is safe and effective for you  Thoracic Pain, Ambulatory Care   GENERAL INFORMATION:   Thoracic pain  is discomfort in any area between your neck and your abdomen  Thoracic pain may be caused by health conditions that affect your gastrointestinal system, lungs, bones, or muscles  It can also be caused by trauma, panic attacks, or anxiety related to stress    Seek immediate care for the following symptoms:   · Pain that gets worse or lasts longer than 5 minutes    · Angina (pressure or squeezing chest pain) that does not get better when you take your usual angina medicine     · Pain with shortness of breath, sweating, dizziness, vomiting, or nausea    · Pain that spreads to your arm, neck, back, jaw, or stomach  Treatment for thoracic pain  may include any of the following:  · Acetaminophen  decreases pain  It is available without a prescription  Ask how much to take and how often to take it  Follow directions  Acetaminophen can cause liver damage if not taken correctly  · NSAIDs  help decrease swelling and pain or fever  This medicine is available with or without a doctor's order  NSAIDs can cause stomach bleeding or kidney problems in certain people  If you take blood thinner medicine, always ask if NSAIDs are safe for you  Always read the medicine label and follow directions  Do not give these medicines to children under 10months of age without direction from your child's doctor  Manage your symptoms:   · Apply heat to the area  Heat helps decrease pain and muscle spasms  Apply heat on the area for 20 to 30 minutes every 2 hours for as many days as directed  · Limit physical activity that causes pain  Rest as needed  Ask your healthcare provider how long you should limit activity  Follow up with your healthcare provider as directed:  Write down your questions so you remember to ask them during your visits  CARE AGREEMENT:   You have the right to help plan your care  Learn about your health condition and how it may be treated  Discuss treatment options with your caregivers to decide what care you want to receive  You always have the right to refuse treatment  The above information is an  only  It is not intended as medical advice for individual conditions or treatments  Talk to your doctor, nurse or pharmacist before following any medical regimen to see if it is safe and effective for you    © 2014 [facility]  Information is for End User's use only and may not be sold, redistributed or otherwise used for commercial purposes  All illustrations and images included in CareNotes® are the copyrighted property of A [employer] A M [employer] Inc  or [employer] Patel  Bone Metastasis   WHAT YOU NEED TO KNOW:   What is bone metastasis? Bone metastasis is cancer that starts in one area and then spreads to a bone  Some examples are lung, breast, thyroid, prostate, and kidney cancers  Bone metastasis often happens in the spine, upper arm or leg bone, ribs, hips, or skull  Your risk for bone metastasis is higher if you have had cancer for a long time  Cancer that spreads to a bone can weaken the bone and increase your risk for fractures  What are the signs and symptoms of bone metastasis? · Bone pain that is worse at night    · Discomfort when you walk or sit, or trouble finding a comfortable position    · Fatigue or weakness    · Bone fractures    · Numb area, trouble moving a body part, or trouble urinating    · Weight loss without trying, or loss of appetite    · High levels of calcium in your blood     · Bleeding or bruising easily    · Back pain  How is bone metastasis diagnosed? Tell your healthcare provider if you feel pain in one area, or in many areas  Tell him if the pain is constant or comes and goes  Describe the pain  For example, it may be dull, achy, or sharp  You may have shooting pain that starts in your back and goes down your leg  Tell your healthcare provider if anything you do makes the pain worse or helps relieve it  You may need any of the following:  · An x-ray, CT, or MRI  may be used to check your bones for tumors  The pictures may also show missing bone or extra bone  Cancer can prevent new bone from forming, or create extra bone  You may be given contrast liquid to help your bones show up better in the pictures  Tell the healthcare provider if you have ever had an allergic reaction to contrast liquid   Do not enter the MRI room with anything metal  Metal can cause serious damage  Tell the healthcare provider if you have any metal in or on your body  · A bone scan , or PET scan, may be used to check your bones  You will be given a liquid called a tracer  The tracer helps problems in the bones show up better in the scan  You may need more than one bone scan to check the tumor's growth over time  · Blood tests  may be used to check for chemicals called tumor markers that are released by the tumor  The tests may also show if you have large amounts of calcium in your blood  Bone cancer can cause the bone to dissolve and release stored calcium  · A biopsy  is a procedure used to take a sample of the tumor to be checked for cancer  How is bone metastasis treated? Treatment will depend on the type of cancer that spread to a bone  The cancer cells will still look and act like the original cancer  It will respond to treatment for that type of cancer  For example, if you had breast cancer that spread to a bone, you will need treatment used for breast cancer  Treatment may shrink the tumor or slow its growth but may not make the cancer go away completely  · Prescription pain medicine  may be given  Ask how to take this medicine safely  · NSAIDs , such as ibuprofen, help decrease swelling, pain, and fever  This medicine is available with or without a doctor's order  NSAIDs can cause stomach bleeding or kidney problems in certain people  If you take blood thinner medicine, always ask your healthcare provider if NSAIDs are safe for you  Always read the medicine label and follow directions  · Medicine  may be given to reduce bone pain, slow damage caused by the cancer, or prevent the cancer from causing the bone to fracture  · Chemotherapy  (chemo) is medicine used to kill cancer cells  · Radiation  uses high-energy rays to kill cancer cells or keep them from growing quickly      · Targeted therapy  is medicine used to kill cancer cells by destroying what is inside the cells  You may have targeted therapy along with chemo or radiation  · Surgery  may be needed to prevent fractures  Screws, plates, or other devices may be used to help make the bone more stable  Surgery may also be used to help a fractured bone heal correctly  Bone cement may be used along with other treatments to strengthen the bone  What can I do to manage my bone metastasis? · Prevent falls  Wear shoes that fit well and have soles that   Wear shoes both inside and outside  Remove objects from walkways and stairs so you do not trip on them  Place cords for telephones and lamps out of the way so that you do not need to walk over them  Remove small rugs or secure them with double-sided tape  Install bright lights in your home  Use night lights to help light paths to the bathroom or kitchen  · Do not smoke  Smoking increases your risk for new or returning cancer, and can cause bone loss  Smoking can also delay healing after treatment  Ask your healthcare provider for information if you currently smoke and need help quitting  · Limit or do not drink alcohol as directed  Alcohol can decrease bone mineral density and weaken your bones  Limit alcohol to 2 drinks per day if you are a man  Limit alcohol to 1 drink per day if you are a woman  A drink of alcohol is 12 ounces of beer, 5 ounces of wine, or 1½ ounces of liquor  · Eat a variety of healthy foods  Healthy foods include fruits, vegetables, lean meats, beans, and low-fat dairy products  Your healthcare provider may recommend that you get more calcium and vitamin D  Calcium and vitamin D work together to build and protect bones  Good sources of calcium are dairy products, broccoli, tofu, almonds, and canned sardines  Vitamin D is in fish oils, some vegetables, and fortified milk, cereal, and bread  Vitamin D is also formed in the skin when it is exposed to the sun   Ask your healthcare provider how much sunlight is safe for you  · Go to physical or occupational therapy as directed  A physical therapist can teach you safe exercises to strengthen your bones and muscles  Strong muscles can help protect bones  An occupational therapist can teach you how to do your daily activities safely  · Keep a pain diary  Include where you feel the pain and if anything helped relieve it  Bring your pain diary to follow-up visits with your healthcare providers  When should I seek immediate care? · You break a bone  · You have sudden sharp pain, or the pain spreads to other areas of your body  · You have new or worsening pain that does not get better with medicine  · You cannot move part of your body, or it is numb  · You have loss of appetite, nausea, or severe thirst, or you are vomiting  · You are urinating more than usual, or you feel tired, weak, confused, or sleepy  · You have back or neck pain, constipation, and trouble urinating  When should I contact my healthcare provider? · You have muscle weakness, or you are unusually tired  · You have new pain that seems to be coming from a bone  · You have questions or concerns about your condition or care  CARE AGREEMENT:   You have the right to help plan your care  Learn about your health condition and how it may be treated  Discuss treatment options with your caregivers to decide what care you want to receive  You always have the right to refuse treatment  The above information is an  only  It is not intended as medical advice for individual conditions or treatments  Talk to your doctor, nurse or pharmacist before following any medical regimen to see if it is safe and effective for you  © 2017 [employer] [employer] Information is for End User's use only and may not be sold, redistributed or otherwise used for commercial purposes   All illustrations and images included in CareNotes® are the copyrighted property of [employer]  or [address]

## 2017-12-23 NOTE — [place] UNIVERSAL TRANSFER FORM
[place] UNIVERSAL TRANSFER FORM  (ALL ITEMS MUST BE COMPLETED)    1  TRANSFER FROM: [address] [place] [address]    2  DATE OF TRANSFER: 12/23/2017                        TIME OF TRANSFER: STR      3  PATIENT NAME: DANA Bain      YOB: 1960                             GENDER: male    4  LANGUAGE:   English    5  PHYSICIAN NAME:  Cathleen Blackmon                    PHONE: [phone]    6  CODE STATUS: Level 1 - Full Code        Out of Hospital DNR Attached: No    7  :                                      :  Extended Emergency Contact Information  Primary Emergency Contact: Isabel Steward   [address] Phone: [phone]  Relation: [phone] Representative/Proxy:  No           Legal Guardian:  No             NAME OF:           HEALTH CARE REPRESENTATIVE/PROXY:                                         OR           LEGAL GUARDIAN, IF NOT :                                               PHONE:  (Day)           (Night)                        (Cell)    8  REASON FOR TRANSFER: (Must include brief medical history and recent changes in physical function or cognition ) STR            V/S: /83   Pulse (!) 111   Temp 99 1 °F (37 3 °C) (Tympanic)   Resp 18   Ht 5' 6" (1 676 m)   Wt 90 5 kg (199 lb 8 3 oz)   SpO2 93%   BMI 32 20 kg/m²           PAIN: lower back    9  PRIMARY DIAGNOSIS: Metastasis from colon cancer ([facility] [address] )      Secondary Diagnosis:         Pacemaker: No      Internal Defib: No          Mental Health Diagnosis (if Applicable):    10  RESTRAINTS: No     11  RESPIRATORY NEEDS: None    12  ISOLATION/PRECAUTION: None    13  ALLERGY: Tobramycin; Penicillins; and Latex    14  SENSORY:       Vision Good, Hearing Good  and Speech Clear    15  SKIN CONDITION: No Wounds    16  DIET: diabetic    17  IV ACCESS: None    18   PERSONAL ITEMS SENT WITH PATIENT: None    19  ATTACHED DOCUMENTS: MUST ATTACH CURRENT MEDICATION INFORMATION Face Sheet, MAR, Medication Reconciliation and TAR    20  AT RISK ALERTS:Falls and Aspiration        HARM TO: N/A    21  WEIGHT BEARING STATUS:         Left Leg: Limited        Right Leg: Limited    22  MENTAL STATUS:Oriented and Forgetful    23  FUNCTION:        Walk: Not Able        Transfer: Not Able        Toilet: With Help        Feed: With Help    24  IMMUNIZATIONS/SCREENING:     Immunization History   Administered Date(s) Administered    DT (pediatric) 03/11/2004    Influenza 10/12/2015, 10/06/2017    Influenza Quadrivalent Preservative Free 3 years and older IM 11/11/2014, 10/12/2015, 10/26/2015, 09/07/2016, 10/06/2017    Influenza TIV (IM) 10/21/2013    Pneumococcal Conjugate 13-Valent 06/06/2017    Pneumococcal Polysaccharide PPV23 1960, 10/12/2015    Tdap 08/09/2012       25  BOWEL: Continent    26  BLADDER: Continent    27   SENDING FACILITY CONTACT: Farida Phlegm                  Title: RN        Unit: 2S        Phone: [phone] [address] (if known):        Title:        Unit:         Phone:         FORM PREFILLED BY (if applicable)       Title:       Unit:        Phone:         FORM COMPLETED BY Vick Alamo RN      Title: ISHMAEL      Phone: [phone]

## 2017-12-23 NOTE — SOCIAL WORK
Discharge ordered  Pt will be going to [facility] at a skilled level of care  [facility] scheduled to transport pt via stretcher at 1800  Pt, sister Hansa Sonu), unit and CCL aware of plan

## 2017-12-23 NOTE — PHYSICAL THERAPY NOTE
PT TREATMENT     12/23/17 1034   Pain Assessment   Pain Assessment 0-10   Pain Score 9   Pain Type Acute pain   Pain Location Back;Leg   Pain Orientation Lower; Left   Restrictions/Precautions   Other Precautions Fall Risk;Pain;Bed Alarm; Chair Alarm  (non-amb;uses electric wc at home)   General   Chart Reviewed Yes   Family/Caregiver Present No   Subjective   Subjective "This doesn't feel to bad" (pt referring to LE ther-ex)   Bed Mobility   Rolling R 3  Moderate assistance  (x 5 reps with use of bed rails)   Additional items Verbal cues   Rolling L 3  Moderate assistance   Additional items Verbal cues  (x 5 reps with use of bed rails)   Supine to Sit (pt declines due to pain)   Activity Tolerance   Activity Tolerance Patient limited by pain; Patient limited by fatigue;Treatment limited secondary to medical complications (Comment)  (metatstatic colon CA lungs/bones)   Exercises   Quad Sets 10 reps;Bilateral;Supine   Heelslides AAROM; Bilateral;Supine   Glute Sets 10 reps; Supine   Hip Abduction AAROM; Bilateral;Supine;10 reps   Knee AROM Short Arc Quad AAROM; Right;Supine;10 reps   Ankle Pumps AROM; Right;AAROM; Left;Supine;10 reps   Neuro re-ed (Pt needs increased time to complete LE ex and rest btwn ex)   Assessment   Assessment Pt with good tolerance to LE exercise and rolling reps  Pt needs increased time to complete LE ex and rest inbetween each ex due to pain and fatigue  Plan   Treatment/Interventions LE strengthening/ROM;ADL retraining;Functional transfer training; Therapeutic exercise; Endurance training;Patient/family training;Bed mobility   PT Frequency (3-5x/wk)   Recommendation   Recommendation (STR)

## 2017-12-23 NOTE — PLAN OF CARE
Problem: DISCHARGE PLANNING - CARE MANAGEMENT  Goal: Discharge to post-acute care or home with appropriate resources  INTERVENTIONS:  - Conduct assessment to determine patient/family and health care team treatment goals, and need for post-acute services based on payer coverage, community resources, and patient preferences, and barriers to discharge  - Address psychosocial, clinical, and financial barriers to discharge as identified in assessment in conjunction with the patient/family and health care team  - Arrange appropriate level of post-acute services according to patient's   needs and preference and payer coverage in collaboration with the physician and health care team  - Communicate with and update the patient/family, physician, and health care team regarding progress on the discharge plan  - Arrange short term rehab placement  - Arrange appropriate transportation to post-acute venues  -Discharge to CCL     Outcome: Adequate for Discharge  Pt will be going to [facility] at a skilled level of care  [facility] scheduled to transport pt via stretcher at 1800  Pt, sister, unit and CCL aware of plan

## 2018-01-03 ENCOUNTER — APPOINTMENT (EMERGENCY)
Dept: RADIOLOGY | Facility: HOSPITAL | Age: 58
End: 2018-01-03
Payer: MEDICARE

## 2018-01-03 ENCOUNTER — HOSPITAL ENCOUNTER (OUTPATIENT)
Facility: HOSPITAL | Age: 58
Setting detail: OBSERVATION
Discharge: NURSING, REHAB OR LONG-TERM CARE | End: 2018-01-04
Attending: EMERGENCY MEDICINE | Admitting: FAMILY MEDICINE
Payer: MEDICARE

## 2018-01-03 DIAGNOSIS — R07.9 CHEST PAIN: Primary | ICD-10-CM

## 2018-01-03 LAB
ALBUMIN SERPL BCP-MCNC: 2.3 G/DL (ref 3.5–5)
ALP SERPL-CCNC: 488 U/L (ref 46–116)
ALT SERPL W P-5'-P-CCNC: 105 U/L (ref 12–78)
ANION GAP SERPL CALCULATED.3IONS-SCNC: 11 MMOL/L (ref 4–13)
APTT PPP: 28 SECONDS (ref 23–35)
AST SERPL W P-5'-P-CCNC: 78 U/L (ref 5–45)
BASOPHILS # BLD AUTO: 0 THOUSANDS/ΜL (ref 0–0.1)
BASOPHILS NFR BLD AUTO: 1 % (ref 0–1)
BILIRUB SERPL-MCNC: 0.2 MG/DL (ref 0.2–1)
BILIRUB UR QL STRIP: NEGATIVE
BUN SERPL-MCNC: 8 MG/DL (ref 5–25)
CALCIUM SERPL-MCNC: 9 MG/DL (ref 8.3–10.1)
CHLORIDE SERPL-SCNC: 97 MMOL/L (ref 100–108)
CLARITY UR: CLEAR
CO2 SERPL-SCNC: 28 MMOL/L (ref 21–32)
COLOR UR: YELLOW
CREAT SERPL-MCNC: 0.58 MG/DL (ref 0.6–1.3)
EOSINOPHIL # BLD AUTO: 0.2 THOUSAND/ΜL (ref 0–0.61)
EOSINOPHIL NFR BLD AUTO: 3 % (ref 0–6)
ERYTHROCYTE [DISTWIDTH] IN BLOOD BY AUTOMATED COUNT: 14.6 % (ref 11.6–15.1)
GFR SERPL CREATININE-BSD FRML MDRD: 114 ML/MIN/1.73SQ M
GLUCOSE SERPL-MCNC: 134 MG/DL (ref 65–140)
GLUCOSE UR STRIP-MCNC: NEGATIVE MG/DL
HCT VFR BLD AUTO: 33.8 % (ref 42–52)
HGB BLD-MCNC: 11 G/DL (ref 14–18)
HGB UR QL STRIP.AUTO: NEGATIVE
INR PPP: 1.1 (ref 0.86–1.16)
KETONES UR STRIP-MCNC: NEGATIVE MG/DL
LEUKOCYTE ESTERASE UR QL STRIP: NEGATIVE
LIPASE SERPL-CCNC: 142 U/L (ref 73–393)
LYMPHOCYTES # BLD AUTO: 1.5 THOUSANDS/ΜL (ref 0.6–4.47)
LYMPHOCYTES NFR BLD AUTO: 20 % (ref 14–44)
MAGNESIUM SERPL-MCNC: 1.6 MG/DL (ref 1.6–2.6)
MCH RBC QN AUTO: 28.3 PG (ref 27–31)
MCHC RBC AUTO-ENTMCNC: 32.6 G/DL (ref 31.4–37.4)
MCV RBC AUTO: 87 FL (ref 82–98)
MONOCYTES # BLD AUTO: 0.8 THOUSAND/ΜL (ref 0.17–1.22)
MONOCYTES NFR BLD AUTO: 11 % (ref 4–12)
NEUTROPHILS # BLD AUTO: 4.9 THOUSANDS/ΜL (ref 1.85–7.62)
NEUTS SEG NFR BLD AUTO: 66 % (ref 43–75)
NITRITE UR QL STRIP: NEGATIVE
NT-PROBNP SERPL-MCNC: 78 PG/ML
PH UR STRIP.AUTO: 8 [PH] (ref 5–9)
PLATELET # BLD AUTO: 407 THOUSANDS/UL (ref 130–400)
PMV BLD AUTO: 7.2 FL (ref 8.9–12.7)
POTASSIUM SERPL-SCNC: 4.1 MMOL/L (ref 3.5–5.3)
PROT SERPL-MCNC: 6.8 G/DL (ref 6.4–8.2)
PROT UR STRIP-MCNC: NEGATIVE MG/DL
PROTHROMBIN TIME: 11.6 SECONDS (ref 9.4–11.7)
RBC # BLD AUTO: 3.9 MILLION/UL (ref 4.7–6.1)
SODIUM SERPL-SCNC: 136 MMOL/L (ref 136–145)
SP GR UR STRIP.AUTO: 1.01 (ref 1–1.03)
TROPONIN I SERPL-MCNC: <0.02 NG/ML
UROBILINOGEN UR QL STRIP.AUTO: 0.2 E.U./DL
WBC # BLD AUTO: 7.4 THOUSAND/UL (ref 4.8–10.8)

## 2018-01-03 PROCEDURE — 85025 COMPLETE CBC W/AUTO DIFF WBC: CPT | Performed by: EMERGENCY MEDICINE

## 2018-01-03 PROCEDURE — 83690 ASSAY OF LIPASE: CPT | Performed by: EMERGENCY MEDICINE

## 2018-01-03 PROCEDURE — 93005 ELECTROCARDIOGRAM TRACING: CPT

## 2018-01-03 PROCEDURE — 83735 ASSAY OF MAGNESIUM: CPT | Performed by: EMERGENCY MEDICINE

## 2018-01-03 PROCEDURE — 81003 URINALYSIS AUTO W/O SCOPE: CPT | Performed by: EMERGENCY MEDICINE

## 2018-01-03 PROCEDURE — 84484 ASSAY OF TROPONIN QUANT: CPT | Performed by: EMERGENCY MEDICINE

## 2018-01-03 PROCEDURE — 80053 COMPREHEN METABOLIC PANEL: CPT | Performed by: EMERGENCY MEDICINE

## 2018-01-03 PROCEDURE — 99285 EMERGENCY DEPT VISIT HI MDM: CPT

## 2018-01-03 PROCEDURE — 36415 COLL VENOUS BLD VENIPUNCTURE: CPT | Performed by: EMERGENCY MEDICINE

## 2018-01-03 PROCEDURE — 85730 THROMBOPLASTIN TIME PARTIAL: CPT | Performed by: EMERGENCY MEDICINE

## 2018-01-03 PROCEDURE — 71045 X-RAY EXAM CHEST 1 VIEW: CPT

## 2018-01-03 PROCEDURE — 83880 ASSAY OF NATRIURETIC PEPTIDE: CPT | Performed by: EMERGENCY MEDICINE

## 2018-01-03 PROCEDURE — 96360 HYDRATION IV INFUSION INIT: CPT

## 2018-01-03 PROCEDURE — 85610 PROTHROMBIN TIME: CPT | Performed by: EMERGENCY MEDICINE

## 2018-01-03 RX ORDER — ACETAMINOPHEN 325 MG/1
650 TABLET ORAL EVERY 6 HOURS PRN
COMMUNITY

## 2018-01-03 RX ORDER — NITROGLYCERIN 0.4 MG/1
0.4 TABLET SUBLINGUAL ONCE
Status: COMPLETED | OUTPATIENT
Start: 2018-01-03 | End: 2018-01-03

## 2018-01-03 RX ADMIN — SODIUM CHLORIDE 1000 ML: 0.9 INJECTION, SOLUTION INTRAVENOUS at 21:28

## 2018-01-03 RX ADMIN — NITROGLYCERIN 0.4 MG: 0.4 TABLET SUBLINGUAL at 21:29

## 2018-01-04 VITALS
TEMPERATURE: 97.8 F | HEART RATE: 108 BPM | WEIGHT: 194.7 LBS | OXYGEN SATURATION: 97 % | DIASTOLIC BLOOD PRESSURE: 75 MMHG | HEIGHT: 66 IN | RESPIRATION RATE: 18 BRPM | BODY MASS INDEX: 31.29 KG/M2 | SYSTOLIC BLOOD PRESSURE: 112 MMHG

## 2018-01-04 PROBLEM — R07.9 CHEST PAIN: Status: RESOLVED | Noted: 2017-05-18 | Resolved: 2018-01-04

## 2018-01-04 LAB
ANION GAP SERPL CALCULATED.3IONS-SCNC: 9 MMOL/L (ref 4–13)
ATRIAL RATE: 117 BPM
BUN SERPL-MCNC: 7 MG/DL (ref 5–25)
CALCIUM SERPL-MCNC: 8.9 MG/DL (ref 8.3–10.1)
CHLORIDE SERPL-SCNC: 101 MMOL/L (ref 100–108)
CO2 SERPL-SCNC: 27 MMOL/L (ref 21–32)
CREAT SERPL-MCNC: 0.48 MG/DL (ref 0.6–1.3)
ERYTHROCYTE [DISTWIDTH] IN BLOOD BY AUTOMATED COUNT: 15.7 % (ref 11.6–15.1)
GFR SERPL CREATININE-BSD FRML MDRD: 123 ML/MIN/1.73SQ M
GLUCOSE P FAST SERPL-MCNC: 100 MG/DL (ref 65–99)
GLUCOSE SERPL-MCNC: 100 MG/DL (ref 65–140)
GLUCOSE SERPL-MCNC: 180 MG/DL (ref 65–140)
GLUCOSE SERPL-MCNC: 75 MG/DL (ref 65–140)
GLUCOSE SERPL-MCNC: 87 MG/DL (ref 65–140)
GLUCOSE SERPL-MCNC: 94 MG/DL (ref 65–140)
HCT VFR BLD AUTO: 31.5 % (ref 42–52)
HGB BLD-MCNC: 10.2 G/DL (ref 14–18)
MAGNESIUM SERPL-MCNC: 1.5 MG/DL (ref 1.6–2.6)
MCH RBC QN AUTO: 28.5 PG (ref 27–31)
MCHC RBC AUTO-ENTMCNC: 32.4 G/DL (ref 31.4–37.4)
MCV RBC AUTO: 88 FL (ref 82–98)
P AXIS: 49 DEGREES
PHOSPHATE SERPL-MCNC: 4 MG/DL (ref 2.7–4.5)
PLATELET # BLD AUTO: 346 THOUSANDS/UL (ref 130–400)
PMV BLD AUTO: 6.3 FL (ref 8.9–12.7)
POTASSIUM SERPL-SCNC: 4 MMOL/L (ref 3.5–5.3)
PR INTERVAL: 126 MS
QRS AXIS: 34 DEGREES
QRSD INTERVAL: 82 MS
QT INTERVAL: 328 MS
QTC INTERVAL: 457 MS
RBC # BLD AUTO: 3.58 MILLION/UL (ref 4.7–6.1)
SODIUM SERPL-SCNC: 137 MMOL/L (ref 136–145)
T WAVE AXIS: 52 DEGREES
TROPONIN I SERPL-MCNC: <0.02 NG/ML
TROPONIN I SERPL-MCNC: <0.02 NG/ML
VENTRICULAR RATE: 117 BPM
WBC # BLD AUTO: 6.6 THOUSAND/UL (ref 4.8–10.8)

## 2018-01-04 PROCEDURE — 82948 REAGENT STRIP/BLOOD GLUCOSE: CPT

## 2018-01-04 PROCEDURE — 80048 BASIC METABOLIC PNL TOTAL CA: CPT | Performed by: STUDENT IN AN ORGANIZED HEALTH CARE EDUCATION/TRAINING PROGRAM

## 2018-01-04 PROCEDURE — 84484 ASSAY OF TROPONIN QUANT: CPT | Performed by: STUDENT IN AN ORGANIZED HEALTH CARE EDUCATION/TRAINING PROGRAM

## 2018-01-04 PROCEDURE — 87081 CULTURE SCREEN ONLY: CPT | Performed by: FAMILY MEDICINE

## 2018-01-04 PROCEDURE — 94760 N-INVAS EAR/PLS OXIMETRY 1: CPT

## 2018-01-04 PROCEDURE — 87147 CULTURE TYPE IMMUNOLOGIC: CPT | Performed by: FAMILY MEDICINE

## 2018-01-04 PROCEDURE — 83735 ASSAY OF MAGNESIUM: CPT | Performed by: STUDENT IN AN ORGANIZED HEALTH CARE EDUCATION/TRAINING PROGRAM

## 2018-01-04 PROCEDURE — 85027 COMPLETE CBC AUTOMATED: CPT | Performed by: STUDENT IN AN ORGANIZED HEALTH CARE EDUCATION/TRAINING PROGRAM

## 2018-01-04 PROCEDURE — 84100 ASSAY OF PHOSPHORUS: CPT | Performed by: STUDENT IN AN ORGANIZED HEALTH CARE EDUCATION/TRAINING PROGRAM

## 2018-01-04 RX ORDER — CLOPIDOGREL BISULFATE 75 MG/1
75 TABLET ORAL DAILY
Status: DISCONTINUED | OUTPATIENT
Start: 2018-01-04 | End: 2018-01-04 | Stop reason: HOSPADM

## 2018-01-04 RX ORDER — NITROGLYCERIN 0.4 MG/1
0.4 TABLET SUBLINGUAL AS NEEDED
Status: DISCONTINUED | OUTPATIENT
Start: 2018-01-04 | End: 2018-01-04 | Stop reason: HOSPADM

## 2018-01-04 RX ORDER — HYDROMORPHONE HYDROCHLORIDE 2 MG/1
2 TABLET ORAL
Qty: 60 TABLET | Refills: 0
Start: 2018-01-04 | End: 2018-01-14

## 2018-01-04 RX ORDER — BACLOFEN 10 MG/1
10 TABLET ORAL 2 TIMES DAILY
Status: DISCONTINUED | OUTPATIENT
Start: 2018-01-04 | End: 2018-01-04 | Stop reason: HOSPADM

## 2018-01-04 RX ORDER — FENTANYL 75 UG/H
1 PATCH TRANSDERMAL
Status: DISCONTINUED | OUTPATIENT
Start: 2018-01-04 | End: 2018-01-04 | Stop reason: HOSPADM

## 2018-01-04 RX ORDER — OXYCODONE HYDROCHLORIDE 10 MG/1
10 TABLET ORAL EVERY 6 HOURS PRN
Status: DISCONTINUED | OUTPATIENT
Start: 2018-01-04 | End: 2018-01-04 | Stop reason: HOSPADM

## 2018-01-04 RX ORDER — PRAVASTATIN SODIUM 40 MG
40 TABLET ORAL
Status: DISCONTINUED | OUTPATIENT
Start: 2018-01-04 | End: 2018-01-04 | Stop reason: HOSPADM

## 2018-01-04 RX ORDER — TAMSULOSIN HYDROCHLORIDE 0.4 MG/1
0.4 CAPSULE ORAL EVERY MORNING
Status: DISCONTINUED | OUTPATIENT
Start: 2018-01-04 | End: 2018-01-04 | Stop reason: HOSPADM

## 2018-01-04 RX ORDER — LISINOPRIL 10 MG/1
10 TABLET ORAL EVERY MORNING
Status: DISCONTINUED | OUTPATIENT
Start: 2018-01-04 | End: 2018-01-04 | Stop reason: HOSPADM

## 2018-01-04 RX ORDER — FAMOTIDINE 20 MG/1
40 TABLET, FILM COATED ORAL DAILY
Status: DISCONTINUED | OUTPATIENT
Start: 2018-01-05 | End: 2018-01-04 | Stop reason: HOSPADM

## 2018-01-04 RX ORDER — HYDROMORPHONE HYDROCHLORIDE 2 MG/1
2 TABLET ORAL EVERY 6 HOURS PRN
Status: DISCONTINUED | OUTPATIENT
Start: 2018-01-04 | End: 2018-01-04 | Stop reason: HOSPADM

## 2018-01-04 RX ORDER — ISOSORBIDE MONONITRATE 30 MG/1
30 TABLET, EXTENDED RELEASE ORAL EVERY MORNING
Status: DISCONTINUED | OUTPATIENT
Start: 2018-01-05 | End: 2018-01-04 | Stop reason: HOSPADM

## 2018-01-04 RX ORDER — PAROXETINE HYDROCHLORIDE 20 MG/1
30 TABLET, FILM COATED ORAL DAILY
Status: DISCONTINUED | OUTPATIENT
Start: 2018-01-04 | End: 2018-01-04 | Stop reason: HOSPADM

## 2018-01-04 RX ORDER — MAGNESIUM SULFATE HEPTAHYDRATE 40 MG/ML
2 INJECTION, SOLUTION INTRAVENOUS ONCE
Status: DISCONTINUED | OUTPATIENT
Start: 2018-01-04 | End: 2018-01-04

## 2018-01-04 RX ADMIN — TIOTROPIUM BROMIDE 18 MCG: 18 CAPSULE ORAL; RESPIRATORY (INHALATION) at 08:54

## 2018-01-04 RX ADMIN — METOPROLOL TARTRATE 12.5 MG: 25 TABLET ORAL at 17:08

## 2018-01-04 RX ADMIN — INSULIN LISPRO 1 UNITS: 100 INJECTION, SOLUTION INTRAVENOUS; SUBCUTANEOUS at 11:59

## 2018-01-04 RX ADMIN — LISINOPRIL 10 MG: 10 TABLET ORAL at 08:48

## 2018-01-04 RX ADMIN — FENTANYL 1 PATCH: 75 PATCH, EXTENDED RELEASE TRANSDERMAL at 06:03

## 2018-01-04 RX ADMIN — HYDROMORPHONE HYDROCHLORIDE 2 MG: 2 TABLET ORAL at 18:22

## 2018-01-04 RX ADMIN — BACLOFEN 10 MG: 10 TABLET ORAL at 17:08

## 2018-01-04 RX ADMIN — PRAVASTATIN SODIUM 40 MG: 40 TABLET ORAL at 17:08

## 2018-01-04 RX ADMIN — HYDROMORPHONE HYDROCHLORIDE 2 MG: 2 TABLET ORAL at 00:52

## 2018-01-04 RX ADMIN — CLOPIDOGREL BISULFATE 75 MG: 75 TABLET ORAL at 08:46

## 2018-01-04 RX ADMIN — Medication 400 MG: at 14:12

## 2018-01-04 RX ADMIN — HYDROMORPHONE HYDROCHLORIDE 2 MG: 2 TABLET ORAL at 12:02

## 2018-01-04 RX ADMIN — MAGNESIUM SULFATE HEPTAHYDRATE 2 G: 40 INJECTION, SOLUTION INTRAVENOUS at 13:33

## 2018-01-04 RX ADMIN — ENOXAPARIN SODIUM 40 MG: 40 INJECTION SUBCUTANEOUS at 08:48

## 2018-01-04 RX ADMIN — OXYCODONE HYDROCHLORIDE 10 MG: 10 TABLET ORAL at 05:59

## 2018-01-04 RX ADMIN — BACLOFEN 10 MG: 10 TABLET ORAL at 08:47

## 2018-01-04 RX ADMIN — PAROXETINE HYDROCHLORIDE 30 MG: 20 TABLET, FILM COATED ORAL at 08:47

## 2018-01-04 RX ADMIN — TAMSULOSIN HYDROCHLORIDE 0.4 MG: 0.4 CAPSULE ORAL at 08:46

## 2018-01-04 NOTE — PROGRESS NOTES
Family medicine resident continuity note    Batsheva Do is a 67-year-old  male with history of hypertension, hyperlipidemia, COPD, obstructive sleep apnea on CPAP, CAD, BPH, depression with anxiety, metastatic disease of the colon, spinal stenosis who presented to the ED from [facility] with complaints of substernal chest pain which started yesterday evening, lasting approximately 2 to 3 hours which he describes as sharp and stabbing in nature with radiation down his left upper extremity  Patient also reports loss of sensation, weakness, numbness, paresthesias in his left upper extremity  Patient also appeared pale and diaphoretic  Patient reports his legs give away however does not recall losing consciousness or passing out  Patient denies any URI like symptoms, headache, visual disturbances, shortness of breath,  symptoms is  This morning patient was seen and examined, patient reports his chest pain has resolved after being given two nitro stat tablets in the ED  Patient tolerating oral intake and voiding independently this morning  Patient has remained afebrile  Vitals:    01/04/18 0315   BP: 122/79   Pulse: (!) 114   Resp: 18   Temp: 99 °F (37 2 °C)   SpO2: 97%     GEN: NAD, awake, alert, oriented to time place and person  RESP:  Clear to auscultation bilaterally  CVS:  Normal rate, regular rhythm, normal heart sounds, no murmurs/gallops/ friction rubs, nontender to palpation of the chest wall  Extremities:  No edema, cyanosis      This is a 67-year-old male with history of hypertension, diastolic disease of the colon, CAD status post PCI x2 who presented with chest pain to rule out ACS  Patient had a stress test done in May 2017 which showed a left ventricular ejection fraction of 55 percent with a partially reversible myocardial perfusion defect of the inferior wall  EKG on admission revealed no acute ST-T wave ischemic changes, the troponins x3 have been negative    Continue with telemetry  Place patient on FRANSISCO  Awaiting cardiology recommendations which are appreciated  Keep potassium above four and magnesium above two for cardi protection  Patient had cardiac catheterization done in January 2016 which showed no evidence of any critical coronary artery disease      All other chronic medical conditions as managed by primary care team     Alvarez Patton

## 2018-01-04 NOTE — ED PROVIDER NOTES
History  Chief Complaint   Patient presents with    Chest Pain     pt complaining of left sided stabbing chest pain  Per medics, pt refuses baby asa and nitro     Pt in ER with c/o substernal sharp chest pain of sudden onset  Pt also with chronic back and leg pain  Chest pain doesn't radiate            Prior to Admission Medications   Prescriptions Last Dose Informant Patient Reported? Taking? CANDIDO MICROLET LANCETS lancets   Yes Yes   Sig: Candido Microlet Lancets Miscellaneous USE AS DIRECTED  Quantity: 1;  Refills: 6    Jewels CHAMORRO ;  Started 2014 [id number] EA Package   HYDROmorphone (DILAUDID) 2 mg tablet 1/3/2018 at 1630  No Yes   Sig: Take 1 tablet by mouth every 4 (four) hours as needed for severe pain for up to 30 days Max Daily Amount: 12 mg   Patient taking differently: Take 2 mg by mouth every 6 (six) hours as needed for severe pain     Insulin Pen Needle (RELION SHORT PEN NEEDLES) 31G X 8 MM MISC   Yes No   Sig: ReliOn Short Pen Needles 31G X 8 MM Miscellaneous USE AS DIRECTED ONCE DAILY AT BEDTIME  Quantity: 50;  Refills: 1    Anila CHAMORRO ;  Started 2014 Active   PARoxetine (PAXIL) 30 mg tablet 1/3/2018 at 2100  Yes Yes   Si mg daily at bedtime PARoxetine HCl - 30 MG Oral Tablet TAKE ONE TABLET (30MG) BY MOUTH ONCE DAILY  Quantity: 30;  Refills: 5    Kota CHAMORRO O ;  Started 5-Dec-2014 Active    SURE COMFORT LANCETS 30G MISC   Yes Yes   Sig: Sure Comfort Lancets 30G Miscellaneous USE AS DIRECTED  Quantity: 1;  Refills: 0    Verona Ring Miscellaneous Box   acetaminophen (TYLENOL) 325 mg tablet Past Week at Unknown time  Yes Yes   Sig: Take 650 mg by mouth every 6 (six) hours as needed for mild pain   albuterol (2 5 mg/3 mL) 0 083 % nebulizer solution Past Week at Unknown time  Yes Yes   Sig: Albuterol Sulfate (2 5 MG/3ML) 0 083% Inhalation Nebulization Solution USE 1 UNIT DOSE EVERY 4-6 HOURS AS NEEDED FOR WHEEZING     Quantity: 1;  Refills: 3 Brando CHAMORRO ;  Started 2014 Dirk Neighbours ML Angela    baclofen 10 mg tablet 1/3/2018 at 2100  Yes Yes   Sig: Take 10 mg by mouth 2 (two) times a day     clopidogrel (PLAVIX) 75 mg tablet 1/3/2018 at 0900  Yes Yes   Sig: Take 75 mg by mouth daily   famotidine (PEPCID) 40 MG tablet 1/3/2018 at 0600  Yes Yes   Sig: Take 40 mg by mouth daily   fentaNYL (DURAGESIC) 75 mcg/hr 1/3/2018 at Unknown time  No Yes   Sig: Place 1 patch on the skin every third day for 60 days Max Daily Amount: 1 patch   glipiZIDE (GLUCOTROL) 10 mg tablet 1/3/2018 at 1700  Yes Yes   Sig: GlipiZIDE 10 MG Oral Tablet TAKE TWO TABLETS BY MOUTH TWICE DAILY  Quantity: 120;  Refills: 5    Amy CHAMORRO ;  Started  Active   insulin detemir (LEVEMIR FLEXPEN) 100 Units/mL subcutaneous injection pen 2018 at 2100  Yes Yes   Si Units daily at bedtime Levemir FlexPen 100 UNIT/ML SOPN USE AS DIRECTED  INJECT 40 UNITS SUBCUTANEOUSLY AT BEDTIME  Quantity: 1;  Refills: 5    Migel CHAMORRO ;  Started 27-Aug-2014 Active3 ML Pen (5 Pens)    insulin lispro (HumaLOG) 100 units/mL injection   Yes Yes   Sig: Inject under the skin 3 (three) times a day before meals   isosorbide mononitrate (IMDUR) 30 mg 24 hr tablet   No No   Sig: Take 1 tablet by mouth daily for 30 days   Patient taking differently: Take 30 mg by mouth every morning     lisinopril (ZESTRIL) 10 mg tablet 1/3/2018 at 0900  Yes Yes   Sig: Take 10 mg by mouth every morning   lovastatin (MEVACOR) 40 MG tablet 2018  Yes Yes   Sig: Take 40 mg by mouth daily at bedtime   oxyCODONE (OXY-IR) 5 MG capsule   No Yes   Sig: Take 1 5 tablets every 6 hours as needed     Patient taking differently: 10 mg every 6 (six) hours as needed for severe pain     tamsulosin (FLOMAX) 0 4 mg 1/3/2018 at 1800  Yes Yes   Sig: Take 0 4 mg by mouth every morning     tiotropium (SPIRIVA) 18 mcg inhalation capsule 1/3/2018 at 0900  Yes Yes   Sig: Place 18 mcg into inhaler and inhale daily Facility-Administered Medications: None       Past Medical History:   Diagnosis Date    Anxiety     CAD (coronary artery disease)     Cancer ([facility])     lung-right    Cardiac disease     Chest pain     small mass in the heart-found on a stress test 5/18/17    [facility]     CPAP (continuous positive airway pressure) dependence     Diabetes mellitus ([facility])     Hyperlipidemia     Hypertension     Myocardial infarction 2008    Obstructive sleep apnea on CPAP     Spinal stenosis     Stroke ([facility]) 2008    heat stroke, minor stroke       Past Surgical History:   Procedure Laterality Date    ANGIOPLASTY  2009    2 stents    APPENDECTOMY      COLON SURGERY  2010    16 inches removed-obstruction    COLONOSCOPY      CORONARY STENT PLACEMENT  2009    [place] COLSC FLX W/RMVL OF TUMOR POLYP LESION SNARE TQ N/A 5/23/2017    Procedure: COLONOSCOPY and biopsy and snare polypectomy ;  Surgeon: Mesfin Sam MD;  Location: [facility]; Service: Gastroenterology    [place] EGD TRANSORAL BIOPSY SINGLE/MULTIPLE N/A 5/23/2017    Procedure: ESOPHAGOGASTRODUODENOSCOPY (EGD) and biopsy ;  Surgeon: Mesfin Sam MD;  Location: [facility]; Service: Gastroenterology    TONSILLECTOMY         Family History   Problem Relation Age of Onset    Heart disease Mother      pacemaker    Diabetes Mother     COPD Father     Diabetes Father     Heart disease Father      pacemaker    Heart disease Brother      massive MI     I have reviewed and agree with the history as documented  Social History   Substance Use Topics    Smoking status: Former Smoker     Packs/day: 1 00     Years: 40 00     Types: Cigarettes     Quit date: 12/4/2017    Smokeless tobacco: Never Used    Alcohol use No        Review of Systems   Constitutional: Negative for chills and fever  Cardiovascular: Positive for chest pain  Negative for palpitations and leg swelling     Musculoskeletal: Positive for back pain and gait problem  Negative for joint swelling  All other systems reviewed and are negative  Physical Exam  ED Triage Vitals [01/03/18 2121]   Temperature Pulse Respirations Blood Pressure SpO2   98 °F (36 7 °C) (!) 118 20 127/86 98 %      Temp Source Heart Rate Source Patient Position - Orthostatic VS BP Location FiO2 (%)   Tympanic Monitor Lying Left arm --      Pain Score       Worst Possible Pain           Orthostatic Vital Signs  Vitals:    01/04/18 0315 01/04/18 0700 01/04/18 1103 01/04/18 1636   BP: 122/79 122/79 119/72 112/75   Pulse: (!) 114 72 (!) 115 (!) 108   Patient Position - Orthostatic VS: Lying Lying Lying Lying       Physical Exam   Constitutional: He is oriented to person, place, and time  He appears well-developed and well-nourished  HENT:   Head: Normocephalic and atraumatic  Mouth/Throat: Mucous membranes are dry  Eyes: EOM are normal  Pupils are equal, round, and reactive to light  Cardiovascular: Regular rhythm and normal heart sounds  Tachycardia present  Pulmonary/Chest: Effort normal and breath sounds normal    Abdominal: Soft  Bowel sounds are normal  He exhibits no distension and no mass  There is no tenderness  There is no rebound and no guarding  Neurological: He is alert and oriented to person, place, and time  Skin: Skin is warm and dry  Psychiatric: He has a normal mood and affect  His behavior is normal  Judgment and thought content normal    Nursing note and vitals reviewed        ED Medications  Medications   sodium chloride 0 9 % bolus 1,000 mL (0 mL Intravenous Stopped 1/3/18 2300)   nitroglycerin (NITROSTAT) SL tablet 0 4 mg (0 4 mg Sublingual Given 1/3/18 2129)   magnesium oxide (MAG-OX) tablet 400 mg (400 mg Oral Given 1/4/18 1412)       Diagnostic Studies  Results Reviewed     Procedure Component Value Units Date/Time    UA w Reflex to Microscopic [[id number]]  (Normal) Collected:  01/03/18 2155    Lab Status:  Final result Specimen:  Urine from Urine, Clean Catch Updated:  01/03/18 2203     Color, UA Yellow     Clarity, UA Clear     Specific Gravity, UA 1 010     pH, UA 8 0     Leukocytes, UA Negative     Nitrite, UA Negative     Protein, UA Negative mg/dl      Glucose, UA Negative mg/dl      Ketones, UA Negative mg/dl      Urobilinogen, UA 0 2 E U /dl      Bilirubin, UA Negative     Blood, UA Negative    B-type natriuretic peptide [[id number]]  (Normal) Collected:  01/03/18 2125    Lab Status:  Final result Specimen:  Blood from Arm, Left Updated:  01/03/18 2155     NT-proBNP 78 pg/mL     Lipase [[id number]]  (Normal) Collected:  01/03/18 2125    Lab Status:  Final result Specimen:  Blood from Arm, Left Updated:  01/03/18 2155     Lipase 142 u/L     Magnesium [[id number]]  (Normal) Collected:  01/03/18 2125    Lab Status:  Final result Specimen:  Blood from Arm, Left Updated:  01/03/18 2155     Magnesium 1 6 mg/dL     Protime-INR [[id number]]  (Normal) Collected:  01/03/18 2125    Lab Status:  Final result Specimen:  Blood from Arm, Left Updated:  01/03/18 2154     Protime 11 6 seconds      INR 1 10    APTT [[id number]]  (Normal) Collected:  01/03/18 2125    Lab Status:  Final result Specimen:  Blood from Arm, Left Updated:  01/03/18 2154     PTT 28 seconds     Narrative: Therapeutic Heparin Range = 60-90 seconds    Troponin I [[id number]]  (Normal) Collected:  01/03/18 2125    Lab Status:  Final result Specimen:  Blood from Arm, Left Updated:  01/03/18 2153     Troponin I <0 02 ng/mL     Narrative:         [employer] Chemistry analyzer 99% cutoff is > 0 04 ng/mL in network labs    o cTnI 99% cutoff is useful only when applied to patients in the clinical setting of myocardial ischemia  o cTnI 99% cutoff should be interpreted in the context of clinical history, ECG findings and possibly cardiac imaging to establish correct diagnosis    o cTnI 99% cutoff may be suggestive but clearly not indicative of a coronary event without the clinical setting of myocardial ischemia  Comprehensive metabolic panel [[id number]]  (Abnormal) Collected:  01/03/18 2125    Lab Status:  Final result Specimen:  Blood from Arm, Left Updated:  01/03/18 2150     Sodium 136 mmol/L      Potassium 4 1 mmol/L      Chloride 97 (L) mmol/L      CO2 28 mmol/L      Anion Gap 11 mmol/L      BUN 8 mg/dL      Creatinine 0 58 (L) mg/dL      Glucose 134 mg/dL      Calcium 9 0 mg/dL      AST 78 (H) U/L       (H) U/L      Alkaline Phosphatase 488 (H) U/L      Total Protein 6 8 g/dL      Albumin 2 3 (L) g/dL      Total Bilirubin 0 20 mg/dL      eGFR 114 ml/min/1 73sq m     Narrative:         National Kidney Disease Education Program recommendations are as follows:  GFR calculation is accurate only with a steady state creatinine  Chronic Kidney disease less than 60 ml/min/1 73 sq  meters  Kidney failure less than 15 ml/min/1 73 sq  meters  CBC and differential [[id number]]  (Abnormal) Collected:  01/03/18 2125    Lab Status:  Final result Specimen:  Blood from Arm, Left Updated:  01/03/18 2137     WBC 7 40 Thousand/uL      RBC 3 90 (L) Million/uL      Hemoglobin 11 0 (L) g/dL      Hematocrit 33 8 (L) %      MCV 87 fL      MCH 28 3 pg      MCHC 32 6 g/dL      RDW 14 6 %      MPV 7 2 (L) fL      Platelets 055 (H) Thousands/uL      Neutrophils Relative 66 %      Lymphocytes Relative 20 %      Monocytes Relative 11 %      Eosinophils Relative 3 %      Basophils Relative 1 %      Neutrophils Absolute 4 90 Thousands/µL      Lymphocytes Absolute 1 50 Thousands/µL      Monocytes Absolute 0 80 Thousand/µL      Eosinophils Absolute 0 20 Thousand/µL      Basophils Absolute 0 00 Thousands/µL                  XR chest 1 view   Final Result by Hector Kamara MD (01/04 0034)      No acute cardiopulmonary process           Workstation performed: [id number]                    Procedures  ECG 12 Lead Documentation  Date/Time: 1/3/2018 9:15 PM  Performed by: Ashly Michaels by: Brittany Gardiner Indications / Diagnosis:  Chest pain  ECG reviewed by me, the ED Provider: yes    Patient location:  ED and bedside  Previous ECG:     Previous ECG:  Unavailable    Comparison to cardiac monitor: Yes    Interpretation:     Interpretation: abnormal    Rate:     ECG rate:  117bpm    ECG rate assessment: tachycardic    Rhythm:     Rhythm: sinus tachycardia             Phone Contacts  ED Phone Contact    ED Course  ED Course                                MDM  CritCare Time    Disposition  Final diagnoses:   Chest pain     Time reflects when diagnosis was documented in both MDM as applicable and the Disposition within this note     Time User Action Codes Description Comment    1/3/2018 10:30 PM Kenton Horta [R07 9] Chest pain       ED Disposition     ED Disposition Condition Comment    Admit  Case was discussed with Dr Sachi Lyon and the patient's admission status was agreed to be Admission Status: observation status to the service of Dr Sachi Lyon   Follow-up Information    None       Discharge Medication List as of 1/4/2018  5:58 PM      START taking these medications    Details   metoprolol tartrate (LOPRESSOR) 25 mg tablet Take 0 5 tablets by mouth every 24 hours, Starting Thu 1/4/2018, No Print         CONTINUE these medications which have CHANGED    Details   HYDROmorphone (DILAUDID) 2 mg tablet Take 1 tablet by mouth every 3 (three) hours as needed for moderate pain for up to 10 days Earliest Fill Date: 1/4/18 Max Daily Amount: 16 mg, Starting Thu 1/4/2018, Until Sun 1/14/2018, No Print         CONTINUE these medications which have NOT CHANGED    Details   acetaminophen (TYLENOL) 325 mg tablet Take 650 mg by mouth every 6 (six) hours as needed for mild pain, Historical Med      albuterol (2 5 mg/3 mL) 0 083 % nebulizer solution Albuterol Sulfate (2 5 MG/3ML) 0 083% Inhalation Nebulization Solution USE 1 UNIT DOSE EVERY 4-6 HOURS AS NEEDED FOR WHEEZING     Quantity: 1;  Refills: 3    Rosemarie Query M ASHTYN ; Started 7-Feb-2014 Active3 ML Angela , Starting 2/7/2014, Until Discontinued, Hi storical Med      baclofen 10 mg tablet Take 10 mg by mouth 2 (two) times a day  , Historical Med      !! CANDIDO MICROLET LANCETS lancets Candido Microlet Lancets Miscellaneous USE AS DIRECTED  Quantity: 1;  Refills: 6    Camila CHAMORRO ;  Started 14-Feb-2014 [id number] EA Package, Historical Med      clopidogrel (PLAVIX) 75 mg tablet Take 75 mg by mouth daily, Historical Med      famotidine (PEPCID) 40 MG tablet Take 40 mg by mouth daily, Historical Med      fentaNYL (DURAGESIC) 75 mcg/hr Place 1 patch on the skin every third day for 60 days Max Daily Amount: 1 patch, Starting Sat 12/23/2017, Until Wed 2/21/2018, No Print      glipiZIDE (GLUCOTROL) 10 mg tablet GlipiZIDE 10 MG Oral Tablet TAKE TWO TABLETS BY MOUTH TWICE DAILY  Quantity: 120;  Refills: 5    Naila CHAMORRO ;  Started 23-June-2015 Active, Starting 6/23/2015, Until Discontinued, Historical Med      insulin detemir (LEVEMIR FLEXPEN) 100 Units/mL subcutaneous injection pen 40 Units daily at bedtime Levemir FlexPen 100 UNIT/ML SOPN USE AS DIRECTED  INJECT 40 UNITS SUBCUTANEOUSLY AT BEDTIME  Quantity: 1;  Refills: 5    Ace Carnes ;  Started 27-Aug-2014 Active3 ML Pen (5 Pens) , Starting 8/27/2014, Until Discontinued,  Historical Med      insulin lispro (HumaLOG) 100 units/mL injection Inject under the skin 3 (three) times a day before meals, Historical Med      lisinopril (ZESTRIL) 10 mg tablet Take 10 mg by mouth every morning, Until Discontinued, Historical Med      lovastatin (MEVACOR) 40 MG tablet Take 40 mg by mouth daily at bedtime, Until Discontinued, Historical Med      PARoxetine (PAXIL) 30 mg tablet 30 mg daily at bedtime PARoxetine HCl - 30 MG Oral Tablet TAKE ONE TABLET (30MG) BY MOUTH ONCE DAILY  Quantity: 30;  Refills: 5    Erwin ARREDONDO ;  Started 5-Dec-2014 Active , Starting 12/5/2014, Until Discontinued, Historical Med      !!  SURE COMFORT LANCETS 30G MISC Sure Comfort Lancets 30G Miscellaneous USE AS DIRECTED  Quantity: 1;  Refills: 0    Ankur Hurtado M D ; [id number] Miscellaneous Box, Until Discontinued, Historical Med      tamsulosin (FLOMAX) 0 4 mg Take 0 4 mg by mouth every morning  , Until Discontinued, Historical Med      tiotropium (SPIRIVA) 18 mcg inhalation capsule Place 18 mcg into inhaler and inhale daily, Historical Med      Insulin Pen Needle (RELION SHORT PEN NEEDLES) 31G X 8 MM MISC ReliOn Short Pen Needles 31G X 8 MM Miscellaneous USE AS DIRECTED ONCE DAILY AT BEDTIME  Quantity: 50;  Refills: 1    Argelia ROLDAN D ;  Started 18-Feb-2014 Active, Starting 2/18/2014, Until Discontinued, Historical Med      isosorbide mononitrate (IMDUR) 30 mg 24 hr tablet Take 1 tablet by mouth daily for 30 days, Starting Fri 5/19/2017, Until Mon 10/30/2017, Print       !! - Potential duplicate medications found  Please discuss with provider  STOP taking these medications       oxyCODONE (OXY-IR) 5 MG capsule Comments:   Reason for Stopping:             No discharge procedures on file      ED Provider  Electronically Signed by           Cinthya Sherman DO  01/09/18 5314

## 2018-01-04 NOTE — PLAN OF CARE
INFECTION - ADULT     Absence or prevention of progression during hospitalization Progressing        PAIN - ADULT     Verbalizes/displays adequate comfort level or baseline comfort level Progressing        Potential for Falls     Patient will remain free of falls Progressing        Prexisting or High Potential for Compromised Skin Integrity     Skin integrity is maintained or improved Progressing

## 2018-01-04 NOTE — CASE MANAGEMENT
Initial Clinical Review    Admission: Date/Time/Statement: OBSERVATION 1/3/18 @ 2230  Orders Placed This Encounter   Procedures    Place in Observation (expected length of stay for this patient is less than two midnights)     Standing Status:   Standing     Number of Occurrences:   1     Order Specific Question:   Admitting Physician     Answer:   Bisi Montenegro     Order Specific Question:   Level of Care     Answer:   Med Surg [16]     ED: Date/Time/Mode of Arrival:   ED Arrival Information     Expected Arrival Acuity Means of Arrival Escorted By Service Admission Type    - 1/3/2018 21:09 Urgent Ambulance [facility] Urgent    Arrival Complaint    chest pain      Chief Complaint:   Chief Complaint   Patient presents with    Chest Pain     pt complaining of left sided stabbing chest pain  Per medics, pt refuses baby asa and nitro   History of Illness:   Pt in ER with c/o substernal sharp chest pain of sudden onset  Pt also with chronic back and leg pain   Chest pain doesn't radiate  ED Vital Signs:   ED Triage Vitals [01/03/18 2121]   Temperature Pulse Respirations Blood Pressure SpO2   98 °F (36 7 °C) (!) 118 20 127/86 98 %      Temp Source Heart Rate Source Patient Position - Orthostatic VS BP Location FiO2 (%)   Tympanic Monitor Lying Left arm --      Pain Score       Worst Possible Pain        Wt Readings from Last 1 Encounters:   01/03/18 88 3 kg (194 lb 11 2 oz)   Vital Signs (abnormal):   , 106, 114  T 99 1  Abnormal Labs/Diagnostic Test Results:   TROP NEG  HGB 10 2  MG 1 5 CR 0 48   CXR=No acute cardiopulmonary process  EKG=Interpretation:     Interpretation: abnormal    Rate:     ECG rate:  117bpm    ECG rate assessment: tachycardic    Rhythm:     Rhythm: sinus tachycardia    ED Treatment:   Medication Administration from 01/03/2018 2109 to 01/03/2018 2316       Date/Time Order Dose Route Action Action by Comments     01/03/2018 2300 sodium chloride 0 9 % bolus 1,000 mL 0 mL Intravenous Stopped Lisa E Apgar, RN      2018 sodium chloride 0 9 % bolus 1,000 mL 1,000 mL Intravenous New Bag Lisa E Apgar, RN      2018 nitroglycerin (NITROSTAT) SL tablet 0 4 mg 0 4 mg Sublingual Given Lisa E Apgar, RN       Past Medical/Surgical History:    Active Ambulatory Problems     Diagnosis Date Noted    Hypertension     Hyperlipidemia     Diabetes mellitus ([place] [address] )     Cardiac disease     CAD (coronary artery disease)     COPD (chronic obstructive pulmonary disease) ([place] [address] )     CARLOS (obstructive sleep apnea)     Depression     Chest pain 2017    Anxiety and depression 2017    BPH (benign prostatic hyperplasia) 2017    Spinal stenosis 2017    GERD (gastroesophageal reflux disease) 2017    Tobacco use 2017    Multiple lung nodules on CT 2017    RUQ pain 2017    Obstructive sleep apnea on CPAP     Colon cancer ([place] [address] ) 2017    Sinus tachycardia 10/30/2017    Generalized weakness 10/30/2017    Chest wall pain 10/30/2017    Weight loss 10/30/2017    Acute pain 10/30/2017    Metastasis from colon cancer ([place] [address] ) 2017     Resolved Ambulatory Problems     Diagnosis Date Noted    Pneumonia 2016    Sepsis ([place] [address] ) 2016    Fall 2017     Past Medical History:   Diagnosis Date    Anxiety     CAD (coronary artery disease)     Cancer ([place] [address] )     Cardiac disease     Chest pain     [facility]     CPAP (continuous positive airway pressure) dependence     Diabetes mellitus ([place] [address] )     Hyperlipidemia     Hypertension     Myocardial infarction     Obstructive sleep apnea on CPAP     Spinal stenosis     Stroke [facility])    Admitting Diagnosis: Chest pain [R07 9]  Age/Sex: 65 y o  male  Assessment/Plan:   Chest Pain:  · Likely 2/2 Acute Coronary Syndrome  · Myocardial Stress on 17:   ? No chest pain during stress, Stress ECG was equivocal for ischemia, Moderate-sized, partially reversible myocardial perfusion defect of the inferior wall possibly due to diaphragm attenuation, LVEF 55%, Abnormal study after pharmacologic vasodilation  · Troponin on admission: <0 02; will obtain Troponin x2 and trend q3h  · Will monitor with Telemetry  · Continue Nitrostat 0 4mg SL q4h prn  · Consult Cardiology     Hx of Metastatic Colon Cancer:  · Metastasis to Lung and Liver and Bone  · Stable; continue with Pain Management:   ? Severe Pain: Dilaudid 2mg q4h prn and Oxycodone 10mg q6h prn  ?  Fentanyl Patch 75mcg 1x every 3 days     Diabetes Mellitus:  · Blood Glucose on admission: 134  · Start ISS with Accucheck     CAD:  · s/p PCIx2; Cardiac Cath on 1/13/16: No evidence of CAD  · Stable; continue on home medication: ASA 81mg and Plavix 75mg PO qd     Hyperlipidemia:  · Stable; continue home medication: Lovastatin 40mg PO qd bedtime     Hypertension:  · BP on admission: 116/71  · Stable; continue home medication:  Lopressor 25mg PO qd and Zestril 10mg PO qd     COPD:  · Stable; continue on home medication:  Spiriva and ProAir     BPH:  · Stable; continue on home medication: Flomax 0 4mg PO qd morning     Depression/Anxiety:  · Stable; continue on home medication: Paxil 30mg PO qd bedtime     Tobacco Dependence:  · Start Nicoderm 21mg patch  · Counseled on Smoking Cessation     FEN:  · Cardiac + Diabetic Diet / Replete Electrolytes as Needed / SCD    Admission Orders:  TELEMETRY  CONSULT CARDIO  BLE VENODYNES  ACCUCHECKS WITH COVERAGE SCALE  Scheduled Meds:   baclofen 10 mg Oral BID   clopidogrel 75 mg Oral Daily   enoxaparin 40 mg Subcutaneous Daily   [START ON 1/5/2018] famotidine 40 mg Oral Daily   fentaNYL 1 patch Transdermal Q72H   insulin lispro 1-6 Units Subcutaneous TID AC   insulin lispro 1-6 Units Subcutaneous HS   lisinopril 10 mg Oral QAM   PARoxetine 30 mg Oral Daily   pravastatin 40 mg Oral Daily With Dinner   tamsulosin 0 4 mg Oral QAM   tiotropium 18 mcg Inhalation Daily     Continuous Infusions:    PRN Meds: HYDROmorphone    nitroglycerin    oxyCODONE

## 2018-01-04 NOTE — SOCIAL WORK
Discharge ordered  Pt will be returning to [facility] to resume skilled care  [facility] scheduled to transport pt via stretcher at 1800  Pt, unit ([place]) and [facility] aware of plan

## 2018-01-04 NOTE — DISCHARGE SUMMARY
[facility] Discharge Summary - Joel Arauz 63 y o  male MRN: [id number]    Andra 46  Room / Bed: [address]92 [facility] 419/[address] [place] Encounter: [id number]    BRIEF OVERVIEW  Admitting Provider: Nuvia Syed MD  Discharge Provider: Dr Tanika Coreas  Discharge To:  [facility]  Facility: [facility]    Outpatient Follow-Up: at [address] [place] [address]  Things to address at first follow up visit: f/u CP  Labs and results pending at discharge: none    Admission Date: 1/3/2018     Discharge Date: 1/4/2018  8:44 PM    Primary Discharge Diagnosis  Principal Problem (Resolved):    Chest pain  Active Problems:    Hypertension    Hyperlipidemia    Diabetes mellitus ([facility] [place] [address] )    CAD (coronary artery disease)    COPD (chronic obstructive pulmonary disease) (HCC)    Depression    BPH (benign prostatic hyperplasia)    Metastasis from colon cancer [facility])  Resolved:    Chest pain    Secondary Discharge Diagnoses  n/a  Consulting Providers   Cardiology        [address] [place] [address]    Procedures Performed/Pertinent Test results  trops neg x3    HPI  As copied from H&P by Dr Maria Del Carmen Gibson:  Antonio Adhikari is a 65year old male with a PMH of Hypertension, Hyperlipidemia , COPD, CAD, BPH, Depression with Anxiety, Spinal Stenosis, and Metastatic Disease of the Colon who presents to the ED with a chief complaint of chest pain  As per patient, chest pain is located in the middle of his chest   Around 6:30pm, patient was eating dinner and started to experience severe chest discomfort  Patient states that the chest pain lasted for approximately 2-3 hours  Pain is described as sharp and stabbing in nature with radiation down the left arm  Associated symptoms include loss of sensation, weakness, numbness, tingling in the left upper extremity  Patient also noted to look pale and diaphoretic  Patient further states that he may have lost consciousness, or felt that his legs gave way from under him    Does not recollect if he actually passed out or not  Symptoms were improved with rest and made worse on exertion  Pain noted to be 10/10 in severity  Denies any fever, chills, headache, visual changes, difficulty with speech, shortness of breath, pain on inspiration, and abdominal pain  Of note, patient notes partial relief of chest pain after administration of nitroglycerin x2  Hospital Course    Pt admitted for CP, risk factors include CAD with stents, DM; workup was done to rule out ACS - he was placed on tele - with no events noted, trops were trended to be negativex3  Pt last cardiac cath was without evidence of significant in-stent restenosis  Pt not candidate for aggressive cardiac intervention at this time, 2/2 active advanced colon CA  At this time cardio recommendation is to restart the antiangial med for pt that he was on -- imdur 30mg PO qd, as well as his lopressor 12 5mg PO qd  Pt's CP did resolve shortly after admission with no further recurrence  Pt stable for discharge back to nursing facility on 1/4/17, med adjustments include addition of imdur and lopressor, as well as increase in dose of Dilaudid for his chronic pain from 1mg q4 PRN to 1mg q3h PRN  Scripts for pain meds written and sent with pt  Other chronic conditions managed with home meds and remained stable throughout hospital stay  Pt stable at [place]  Outpt f/u with cardio PRN  Medications   acetaminophen 325 mg tablet   Commonly known as: TYLENOL   Take 650 mg by mouth every 6 (six) hours as needed for mild pain   Refills: 0                     albuterol (2 5 mg/3 mL) 0 083 % nebulizer solution   Albuterol Sulfate (2 5 MG/3ML) 0 083% Inhalation Nebulization Solution USE 1 UNIT DOSE EVERY 4-6 HOURS AS NEEDED FOR WHEEZING    Quantity: 1; Refills: 3 Brando CHAMORRO ; Started 7-Feb-2014 Active3 ML Angela   Refills: 0                    baclofen 10 mg tablet   Take 10 mg by mouth 2 (two) times a day   Refills: 0                    clopidogrel 75 mg tablet   Commonly known as: PLAVIX   Take 75 mg by mouth daily   Refills: 0                    famotidine 40 MG tablet   Commonly known as: PEPCID   Take 40 mg by mouth daily   Refills: 0                    fentaNYL 75 mcg/hr   Commonly known as: [address] 1 patch on the skin every third day for 60 days Max Daily Amount: 1 patch   Refills: 0                    FLOMAX 0 4 mg   Generic drug: tamsulosin   For: Benign Enlargement of Prostate   Take 0 4 mg by mouth every morning   Refills: 0                    glipiZIDE 10 mg tablet   Commonly known as: GLUCOTROL   GlipiZIDE 10 MG Oral Tablet TAKE TWO TABLETS BY MOUTH TWICE DAILY Quantity: 120; Refills: 5 Joseph CHAMORRO ; Started 23-June-2015 Active   Refills: 0                    HYDROmorphone 2 mg tablet   Commonly known as: DILAUDID   Take 1 tablet by mouth every 3 (three) hours as needed for moderate pain for up to 10 days Earliest Fill Date: 1/4/18 Max Daily Amount: 16 mg   Refills: 0   What changed:   0 when to take this   0 reasons to take this                    insulin lispro 100 units/mL injection   Commonly known as: HumaLOG   For: AC and HS   Inject under the skin 3 (three) times a day before meals   Refills: 0                    isosorbide mononitrate 30 mg 24 hr tablet   Commonly known as: IMDUR   Take 1 tablet by mouth daily for 30 days   Refills: 0   What changed: when to take this                    LEVEMIR FLEXPEN subcutaneous injection pen 100 units/mL   Generic drug: insulin detemir   40 Units daily at bedtime Levemir FlexPen 100 UNIT/ML SOPN USE AS DIRECTED   INJECT 40 UNITS SUBCUTANEOUSLY AT BEDTIME Quantity: 1; Refills: 5 Lula Carnes ; Started 27-Aug-2014 Active3 ML Pen (5 Pens)   Refills: 0                    lisinopril 10 mg tablet   Commonly known as: ZESTRIL   Take 10 mg by mouth every morning   Refills: 0                    lovastatin 40 MG tablet   Commonly known as: MEVACOR   Take 40 mg by mouth daily at bedtime   Refills: 0                metoprolol tartrate 25 mg tablet   Commonly known as: LOPRESSOR   Take 0 5 tablets by mouth every 24 hours   Refills: 0                    PARoxetine 30 mg tablet   Commonly known as: PAXIL   30 mg daily at bedtime PARoxetine HCl - 30 MG Oral Tablet TAKE ONE TABLET (30MG) BY MOUTH ONCE DAILY Quantity: 30; Refills: 5 Radha ARREDONDO ; Started 5-Dec-2014 Active   Refills: 0                    tiotropium 18 mcg inhalation capsule   Commonly known as: SPIRIVA   Place 18 mcg into inhaler and inhale daily   Refills: 0               Allergies  Allergies   Allergen Reactions    Tobramycin Rash    Penicillins Hives    Latex Rash       Diet restrictions: heart healthy diet  Activity restrictions: as tolerated  Code Status: Prior      Discharge Condition: stable      Discharge  Statement   I spent 20 minutes discharging the patient  This time was spent on the day of discharge  I had direct contact with the patient on the day of discharge  Additional documentation is required if more than 30 minutes were spent on discharge       Haritha Benavidez DO

## 2018-01-04 NOTE — CONSULTS
CARDIOLOGY CONSULTATION  Zbigniew Wiggins 65 y o  male MRN: [id number]  Unit/Bed#: [address] [place] [address] Encounter: [id number]      History of Present Illness   Physician Requesting Consult: Enid Huston MD  Reason for Consult / Principal Problem:  Chest pain    Assessment/Plan   Chest pain rule out acute coronary syndrome- troponins x2 negative  Reproducible component  Prior history of coronary artery stents  Last cardiac catheterization without evidence of significant InStent restenosis  Patient with previous antianginal medications which were discontinued for unknown reasons  Recommend restarting his long-acting nitrate plus metoprolol  Stage IV colon cancer  Diabetes mellitus  Obstructive sleep apnea with CPAP  COPD    HPI: Zbigniew Wiggins is a 65y o  year old male who presented with chest discomfort worsened with deep palpation  He was at at cardiac rehab dilatation in states that for the past couple of days he has been feeling some subjective cough and fatigue  He denied having any irregular heart rhythm  He denied having any bleeding or bruising  He he denied having any fevers or chills  Any chest heaviness similar to his previous chest pain events  He denied He denied  He denied having  The quality of his chest pain is sharp        Historical Information   Past Medical History:   Diagnosis Date    Anxiety     CAD (coronary artery disease)     [facility]     lung-right    Cardiac disease     Chest pain     small mass in the heart-found on a stress test 5/18/17    COPD [facility]ch[facility] obstructive pulmonary disease) [facility]     CPAP (continuous positive airway pressure) dependence     Diabetes mellitus ([facility] )     Hyperlipidemia     Hypertension     Myocardial infarction 2008    Obstructive sleep apnea on CPAP     Spinal stenosis     Stroke [facility] ) 2008    heat stroke, minor stroke     Past Surgical History:   Procedure Laterality Date    ANGIOPLASTY  2009    2 stents    APPENDECTOMY      COLON SURGERY  2010    16 inches removed-obstruction    COLONOSCOPY      CORONARY STENT PLACEMENT  2009    MA COLSC FLX W/RMVL OF TUMOR POLYP LESION SNARE TQ N/A 5/23/2017    Procedure: COLONOSCOPY and biopsy and snare polypectomy ;  Surgeon: Phylicia Lakhani MD;  Location: [facility]; Service: Gastroenterology    MA EGD TRANSORAL BIOPSY SINGLE/MULTIPLE N/A 5/23/2017    Procedure: ESOPHAGOGASTRODUODENOSCOPY (EGD) and biopsy ;  Surgeon: Phylicia Lakhani MD;  Location: [facility]; Service: Gastroenterology    TONSILLECTOMY       History   Alcohol Use No     History   Drug Use No     History   Smoking Status    Former Smoker    Packs/day: 1 00    Years: 40 00    Types: Cigarettes    Quit date: 12/4/2017   Smokeless Tobacco    Never Used     Family History   Problem Relation Age of Onset    Heart disease Mother      pacemaker    Diabetes Mother     COPD Father     Diabetes Father     Heart disease Father      pacemaker    Heart disease Brother      massive MI       Meds/Allergies   Prior to Admission medications    Medication Sig Start Date End Date Taking? Authorizing Provider   acetaminophen (TYLENOL) 325 mg tablet Take 650 mg by mouth every 6 (six) hours as needed for mild pain   Yes Historical Provider, MD   albuterol (2 5 mg/3 mL) 0 083 % nebulizer solution Albuterol Sulfate (2 5 MG/3ML) 0 083% Inhalation Nebulization Solution USE 1 UNIT DOSE EVERY 4-6 HOURS AS NEEDED FOR WHEEZING   Quantity: 1;  Refills: 3    Brando CHAMORRO ;  Started 7-Feb-2014 Active3 ML Angela  2/7/14  Yes Historical Provider, MD   baclofen 10 mg tablet Take 10 mg by mouth 2 (two) times a day     Yes Historical Provider, MD   CANDIDO MICROLET LANCETS lancets Candido Microlet Lancets Miscellaneous USE AS DIRECTED    Quantity: 1;  Refills: 6    Brando CHAMORRO ;  Started 14-Feb-2014 [id number] EA Package 2/14/14  Yes Historical Provider, MD   clopidogrel (PLAVIX) 75 mg tablet Take 75 mg by mouth daily   Yes Historical Provider, MD   famotidine (PEPCID) 40 MG tablet Take 40 mg by mouth daily   Yes Historical Provider, MD   fentaNYL (DURAGESIC) 75 mcg/hr Place 1 patch on the skin every third day for 60 days Max Daily Amount: 1 patch 12/23/17 2/21/18 Yes Eneida Peters MD   glipiZIDE (GLUCOTROL) 10 mg tablet GlipiZIDE 10 MG Oral Tablet TAKE TWO TABLETS BY MOUTH TWICE DAILY  Quantity: 120;  Refills: 5    Debo CHAMORRO ;  Started 23-June-2015 Active 6/23/15  Yes Historical Provider, MD   HYDROmorphone (DILAUDID) 2 mg tablet Take 1 tablet by mouth every 4 (four) hours as needed for severe pain for up to 30 days Max Daily Amount: 12 mg  Patient taking differently: Take 2 mg by mouth every 6 (six) hours as needed for severe pain   12/23/17 1/22/18 Yes Eneida Peters MD   insulin detemir (LEVEMIR FLEXPEN) 100 Units/mL subcutaneous injection pen 40 Units daily at bedtime Levemir FlexPen 100 UNIT/ML SOPN USE AS DIRECTED  INJECT 40 UNITS SUBCUTANEOUSLY AT BEDTIME  Quantity: 1;  Refills: 5    Samanta CHAMORRO ;  Started 27-Aug-2014 Active3 ML Pen (5 Pens)  8/27/14  Yes Historical Provider, MD   insulin lispro (HumaLOG) 100 units/mL injection Inject under the skin 3 (three) times a day before meals   Yes Historical Provider, MD   lisinopril (ZESTRIL) 10 mg tablet Take 10 mg by mouth every morning   Yes Historical Provider, MD   lovastatin (MEVACOR) 40 MG tablet Take 40 mg by mouth daily at bedtime   Yes Historical Provider, MD   oxyCODONE (OXY-IR) 5 MG capsule Take 1 5 tablets every 6 hours as needed  Patient taking differently: 10 mg every 6 (six) hours as needed for severe pain   5/19/17  Yes Mary Luke MD   PARoxetine (PAXIL) 30 mg tablet 30 mg daily at bedtime PARoxetine HCl - 30 MG Oral Tablet TAKE ONE TABLET (30MG) BY MOUTH ONCE DAILY  Quantity: 30;  Refills: 5    Ned ARREDONDO ;  Started 5-Dec-2014 Active  12/5/14  Yes Historical Provider, MD ALVAREZ COMFORT LANCETS 30G MISC Sure Comfort Lancets 30G Miscellaneous USE AS DIRECTED  Quantity: 1;  Refills: 0    Elizabeth CHAMORRO ; Adrianna Stewart Miscellaneous Box   Yes Historical Provider, MD   tamsulosin (FLOMAX) 0 4 mg Take 0 4 mg by mouth every morning     Yes Historical Provider, MD   tiotropium (SPIRIVA) 18 mcg inhalation capsule Place 18 mcg into inhaler and inhale daily   Yes Historical Provider, MD   albuterol (PROAIR HFA) 90 mcg/act inhaler 1 puff 2 (two) times a day ProAir  (90 Base) MCG/ACT Inhalation Aerosol Solution INHALE ONE TO TWO PUFFS BY MOUTH EVERY 4 TO 6 HOURS AS NEEDED  Quantity: 18; Refills: 5    Louise CHAMORRO ;  Started 16-Oct-2014 Active  10/16/14 1/4/18 Yes Historical Provider, MD   metFORMIN (GLUCOPHAGE) 500 mg tablet Take 500 mg by mouth 2 (two) times a day with meals    1/4/18 Yes Historical Provider, MD   metoprolol tartrate (LOPRESSOR) 25 mg tablet Take 25 mg by mouth every morning  1/4/18 Yes Historical Provider, MD   Tobramycin 28 MG CAPS Take by mouth 3 (three) times a day  1/4/18 Yes Historical Provider, MD   Insulin Pen Needle (RELION SHORT PEN NEEDLES) 31G X 8 MM MISC ReliOn Short Pen Needles 31G X 8 MM Miscellaneous USE AS DIRECTED ONCE DAILY AT BEDTIME  Quantity: 50;  Refills: 1    Hunter CHAMORRO ;  Started 18-Feb-2014 Active 2/18/14   Historical Provider, MD   isosorbide mononitrate (IMDUR) 30 mg 24 hr tablet Take 1 tablet by mouth daily for 30 days  Patient taking differently: Take 30 mg by mouth every morning   5/19/17 10/30/17  Petros Johnson MD   clindamycin (CLEOCIN) 300 MG capsule Take 300 mg by mouth 3 (three) times a day  1/4/18  Historical Provider, MD     Current Facility-Administered Medications   Medication Dose Route Frequency Provider Last Rate Last Dose    baclofen tablet 10 mg  10 mg Oral BID Des Piña MD   10 mg at 01/04/18 0847    clopidogrel (PLAVIX) tablet 75 mg  75 mg Oral Daily Des Piña MD   75 mg at 01/04/18 0846    enoxaparin (LOVENOX) subcutaneous injection 40 mg  40 mg Subcutaneous Daily Des Piña MD   40 mg at 01/04/18 0848    [START ON 1/5/2018] famotidine (PEPCID) tablet 40 mg  40 mg Oral Daily Adrian Yang MD        fentaNYL (DURAGESIC) 75 mcg/hr TD 72 hr patch 1 patch  1 patch Transdermal Q72H Adrian Yang MD   1 patch at 01/04/18 0603    HYDROmorphone (DILAUDID) tablet 2 mg  2 mg Oral Q6H PRN Adrian Yang MD   2 mg at 01/04/18 1202    insulin lispro (HumaLOG) 100 units/mL subcutaneous injection 1-6 Units  1-6 Units Subcutaneous TID AC Adrian Yang MD   1 Units at 01/04/18 1159    insulin lispro (HumaLOG) 100 units/mL subcutaneous injection 1-6 Units  1-6 Units Subcutaneous HS Adrian Yang MD        lisinopril (ZESTRIL) tablet 10 mg  10 mg Oral QAJAMAR Yang MD   10 mg at 01/04/18 0848    magnesium sulfate 2 g/50 mL IVPB (premix) 2 g  2 g Intravenous Once Dayna Llanos MD        nitroglycerin (NITROSTAT) SL tablet 0 4 mg  0 4 mg Sublingual PRN Adrian Yang MD        oxyCODONE (ROXICODONE) immediate release tablet 10 mg  10 mg Oral Q6H PRN Adrian Yang MD   10 mg at 01/04/18 0559    PARoxetine (PAXIL) tablet 30 mg  30 mg Oral Daily Adrian Yang MD   30 mg at 01/04/18 0847    pravastatin (PRAVACHOL) tablet 40 mg  40 mg Oral Daily With Andrea Cerda MD        tamsulosin [facility]) capsule 0 4 mg  0 4 mg Oral MIKA Yang MD   0 4 mg at 01/04/18 0846    tiotropium (SPIRIVA) capsule for inhaler 18 mcg  18 mcg Inhalation Daily Adrian Yang MD   18 mcg at 01/04/18 3599     Allergies   Allergen Reactions    Tobramycin Rash    Penicillins Hives    Latex Rash       Review of systems  CONSTITUTIONAL:  As per HPI  HEENT:  Eyes:  No visual loss, blurred vision, double vision or yellow sclerae  Ears, Nose, Throat:  No hearing loss, sneezing, congestion, runny nose or sore throat  SKIN:  No rash or itching  CARDIOVASCULAR:  As per HPI  RESPIRATORY:  No shortness of breath, cough or sputum  GASTROINTESTINAL:  No anorexia, nausea, vomiting or diarrhea   No abdominal pain or blood   GENITOURINARY:  Burning on urination  No flank pain  NEUROLOGICAL:  No headache, dizziness, syncope, paralysis, ataxia, numbness or tingling in the extremities  No change in bowel or bladder control  MUSCULOSKELETAL:  No muscle, back pain, joint pain or stiffness  HEMATOLOGIC:  No anemia, bleeding or bruising  LYMPHATICS:  No enlarged nodes  No history of splenectomy  PSYCHIATRIC:  No active suicidal or homicidal ideation  ENDOCRINOLOGIC:  No reports of sweating, cold or heat intolerance  No polyuria or polydipsia  ALLERGIES:  No history of asthma, hives, eczema or rhinitis  More than 10 systems reviewed and otherwise noncontributory  Objective   Vitals: Blood pressure 119/72, pulse (!) 115, temperature 97 5 °F (36 4 °C), temperature source Oral, resp  rate 18, height 5' 6" (1 676 m), weight 88 3 kg (194 lb 11 2 oz), SpO2 97 %  Physical Exam   Constitutional: He is oriented to person, place, and time  He appears well-developed and well-nourished  No distress  HENT:   Head: Normocephalic and atraumatic  Right Ear: External ear normal    Left Ear: External ear normal    Nose: Nose normal    Mouth/Throat: No oropharyngeal exudate  Eyes: Conjunctivae are normal  Pupils are equal, round, and reactive to light  Right eye exhibits no discharge  Left eye exhibits no discharge  No scleral icterus  Neck: Normal range of motion  No JVD present  No tracheal deviation present  No thyromegaly present  Cardiovascular: Normal rate, regular rhythm and intact distal pulses  Exam reveals no gallop and no friction rub  No murmur heard  Pulmonary/Chest: Effort normal and breath sounds normal  No stridor  No respiratory distress  He has no wheezes  He has no rales  He exhibits no tenderness  Abdominal: Soft  Bowel sounds are normal  He exhibits no distension and no mass  There is no tenderness  There is no rebound and no guarding     Genitourinary:   Genitourinary Comments: No CVA tenderness Musculoskeletal: He exhibits no edema, tenderness or deformity  Neurological: He is alert and oriented to person, place, and time  He displays normal reflexes  He exhibits normal muscle tone  Skin: Skin is warm and dry  No rash noted  He is not diaphoretic  No erythema  Psychiatric: He has a normal mood and affect  His behavior is normal  Judgment and thought content normal    Nursing note and vitals reviewed        Recent Results (from the past 24 hour(s))   ECG 12 lead    Collection Time: 01/03/18  9:08 PM   Result Value Ref Range    Ventricular Rate 117 BPM    Atrial Rate 117 BPM    DE Interval 126 ms    QRSD Interval 82 ms    QT Interval 328 ms    QTC Interval 457 ms    P Axis 49 degrees    QRS Axis 34 degrees    T Wave Axis 52 degrees   Comprehensive metabolic panel    Collection Time: 01/03/18  9:25 PM   Result Value Ref Range    Sodium 136 136 - 145 mmol/L    Potassium 4 1 3 5 - 5 3 mmol/L    Chloride 97 (L) 100 - 108 mmol/L    CO2 28 21 - 32 mmol/L    Anion Gap 11 4 - 13 mmol/L    BUN 8 5 - 25 mg/dL    Creatinine 0 58 (L) 0 60 - 1 30 mg/dL    Glucose 134 65 - 140 mg/dL    Calcium 9 0 8 3 - 10 1 mg/dL    AST 78 (H) 5 - 45 U/L     (H) 12 - 78 U/L    Alkaline Phosphatase 488 (H) 46 - 116 U/L    Total Protein 6 8 6 4 - 8 2 g/dL    Albumin 2 3 (L) 3 5 - 5 0 g/dL    Total Bilirubin 0 20 0 20 - 1 00 mg/dL    eGFR 114 ml/min/1 73sq m   CBC and differential    Collection Time: 01/03/18  9:25 PM   Result Value Ref Range    WBC 7 40 4 80 - 10 80 Thousand/uL    RBC 3 90 (L) 4 70 - 6 10 Million/uL    Hemoglobin 11 0 (L) 14 0 - 18 0 g/dL    Hematocrit 33 8 (L) 42 0 - 52 0 %    MCV 87 82 - 98 fL    MCH 28 3 27 0 - 31 0 pg    MCHC 32 6 31 4 - 37 4 g/dL    RDW 14 6 11 6 - 15 1 %    MPV 7 2 (L) 8 9 - 12 7 fL    Platelets 814 (H) 271 - 400 Thousands/uL    Neutrophils Relative 66 43 - 75 %    Lymphocytes Relative 20 14 - 44 %    Monocytes Relative 11 4 - 12 %    Eosinophils Relative 3 0 - 6 %    Basophils Relative 1 0 - 1 %    Neutrophils Absolute 4 90 1 85 - 7 62 Thousands/µL    Lymphocytes Absolute 1 50 0 60 - 4 47 Thousands/µL    Monocytes Absolute 0 80 0 17 - 1 22 Thousand/µL    Eosinophils Absolute 0 20 0 00 - 0 61 Thousand/µL    Basophils Absolute 0 00 0 00 - 0 10 Thousands/µL   Protime-INR    Collection Time: 01/03/18  9:25 PM   Result Value Ref Range    Protime 11 6 9 4 - 11 7 seconds    INR 1 10 0 86 - 1 16   APTT    Collection Time: 01/03/18  9:25 PM   Result Value Ref Range    PTT 28 23 - 35 seconds   Troponin I    Collection Time: 01/03/18  9:25 PM   Result Value Ref Range    Troponin I <0 02 <=0 04 ng/mL   B-type natriuretic peptide    Collection Time: 01/03/18  9:25 PM   Result Value Ref Range    NT-proBNP 78 <125 pg/mL   Lipase    Collection Time: 01/03/18  9:25 PM   Result Value Ref Range    Lipase 142 73 - 393 u/L   Magnesium    Collection Time: 01/03/18  9:25 PM   Result Value Ref Range    Magnesium 1 6 1 6 - 2 6 mg/dL   UA w Reflex to Microscopic    Collection Time: 01/03/18  9:55 PM   Result Value Ref Range    Color, UA Yellow     Clarity, UA Clear     Specific [place], UA 1 010 1 000 - 1 030    pH, UA 8 0 5 0 - 9 0    Leukocytes, UA Negative Negative    Nitrite, UA Negative Negative    Protein, UA Negative Negative mg/dl    Glucose, UA Negative Negative mg/dl    Ketones, UA Negative Negative mg/dl    Urobilinogen, UA 0 2 0 2, 1 0 E U /dl E U /dl    Bilirubin, UA Negative Negative    Blood, UA Negative Negative   Troponin I    Collection Time: 01/04/18  1:27 AM   Result Value Ref Range    Troponin I <0 02 <=0 04 ng/mL   Fingerstick Glucose (POCT)    Collection Time: 01/04/18  1:28 AM   Result Value Ref Range    POC Glucose 75 65 - 140 mg/dl   Troponin I    Collection Time: 01/04/18  4:51 AM   Result Value Ref Range    Troponin I <0 02 <=0 04 ng/mL   Basic metabolic panel    Collection Time: 01/04/18  4:52 AM   Result Value Ref Range    Sodium 137 136 - 145 mmol/L    Potassium 4 0 3 5 - 5 3 mmol/L Chloride 101 100 - 108 mmol/L    CO2 27 21 - 32 mmol/L    Anion Gap 9 4 - 13 mmol/L    BUN 7 5 - 25 mg/dL    Creatinine 0 48 (L) 0 60 - 1 30 mg/dL    Glucose 100 65 - 140 mg/dL    Glucose, Fasting 100 (H) 65 - 99 mg/dL    Calcium 8 9 8 3 - 10 1 mg/dL    eGFR 123 ml/min/1 73sq m   Magnesium    Collection Time: 01/04/18  4:52 AM   Result Value Ref Range    Magnesium 1 5 (L) 1 6 - 2 6 mg/dL   Phosphorus    Collection Time: 01/04/18  4:52 AM   Result Value Ref Range    Phosphorus 4 0 2 7 - 4 5 mg/dL   CBC (With Platelets)    Collection Time: 01/04/18  4:52 AM   Result Value Ref Range    WBC 6 60 4 80 - 10 80 Thousand/uL    RBC 3 58 (L) 4 70 - 6 10 Million/uL    Hemoglobin 10 2 (L) 14 0 - 18 0 g/dL    Hematocrit 31 5 (L) 42 0 - 52 0 %    MCV 88 82 - 98 fL    MCH 28 5 27 0 - 31 0 pg    MCHC 32 4 31 4 - 37 4 g/dL    RDW 15 7 (H) 11 6 - 15 1 %    Platelets 398 845 - 496 Thousands/uL    MPV 6 3 (L) 8 9 - 12 7 fL   Fingerstick Glucose (POCT)    Collection Time: 01/04/18  7:17 AM   Result Value Ref Range    POC Glucose 94 65 - 140 mg/dl   Fingerstick Glucose (POCT)    Collection Time: 01/04/18 11:08 AM   Result Value Ref Range    POC Glucose 180 (H) 65 - 140 mg/dl     Imaging: I have personally reviewed pertinent films in PACS  EKG: sinus tachycardia no acute st/t wave changes      Counseling / Coordination of Care  Total floor / unit time spent today 60 minutes   Greater than fifty percent of time spent at bedside for coordination of care, patient counseling, history taking:ACS/CP

## 2018-01-04 NOTE — PLAN OF CARE
Problem: DISCHARGE PLANNING - CARE MANAGEMENT  Goal: Discharge to post-acute care or home with appropriate resources  INTERVENTIONS:  - Conduct assessment to determine patient/family and health care team treatment goals, and need for post-acute services based on payer coverage, community resources, and patient preferences, and barriers to discharge  - Address psychosocial, clinical, and financial barriers to discharge as identified in assessment in conjunction with the patient/family and health care team  - Arrange appropriate level of post-acute services according to patient's   needs and preference and payer coverage in collaboration with the physician and health care team  - Communicate with and update the patient/family, physician, and health care team regarding progress on the discharge plan  - Arrange appropriate transportation to post-acute venues  Outcome: Adequate for Discharge  Plan:  Return to [facility] for short term rehab

## 2018-01-04 NOTE — PLAN OF CARE
Problem: DISCHARGE PLANNING - CARE MANAGEMENT  Goal: Discharge to post-acute care or home with appropriate resources  INTERVENTIONS:  - Conduct assessment to determine patient/family and health care team treatment goals, and need for post-acute services based on payer coverage, community resources, and patient preferences, and barriers to discharge  - Address psychosocial, clinical, and financial barriers to discharge as identified in assessment in conjunction with the patient/family and health care team  - Arrange appropriate level of post-acute services according to patient's   needs and preference and payer coverage in collaboration with the physician and health care team  - Communicate with and update the patient/family, physician, and health care team regarding progress on the discharge plan  - Arrange appropriate transportation to post-acute venues   -Discharge back to [facility]  Outcome: Adequate for Discharge  Pt will be transferred back to [facility] scheduled to transport pt via stretcher at 1800  Pt, [place] and [facility] aware of plan

## 2018-01-04 NOTE — PLAN OF CARE
DISCHARGE PLANNING - CARE MANAGEMENT     Discharge to post-acute care or home with appropriate resources Progressing        INFECTION - ADULT     Absence or prevention of progression during hospitalization Progressing        PAIN - ADULT     Verbalizes/displays adequate comfort level or baseline comfort level Progressing        Potential for Falls     Patient will remain free of falls Progressing        Prexisting or High Potential for Compromised Skin Integrity     Skin integrity is maintained or improved Progressing

## 2018-01-04 NOTE — [place] UNIVERSAL TRANSFER FORM
[place] UNIVERSAL TRANSFER FORM  (ALL ITEMS MUST BE COMPLETED)    1  TRANSFER FROM: [address]      TRANSFER TO: [facility]    2  DATE OF TRANSFER: 1/4/2018                        TIME OF TRANSFER: 1800    3  PATIENT NAME: DANA Leggett      YOB: 1960                             GENDER: male    4  LANGUAGE:   English    5  PHYSICIAN NAME:  Dioni Moore MD                   PHONE: [phone]    6  CODE STATUS: Level 3 - DNAR and DNI        Out of Hospital DNR Attached: Yes    7  :                                      :  Extended Emergency Contact Information  Primary Emergency Contact: Kimani Ritchie [address] Phone: [phone]  Mobile Phone: [phone]  Relation: [id number] Representative/Proxy:  No           Legal Guardian:  No             NAME OF:           HEALTH CARE REPRESENTATIVE/PROXY:                                         OR           LEGAL GUARDIAN, IF NOT :                                               PHONE:  (Day)           (Night)                        (Cell)    8  REASON FOR TRANSFER: (Must include brief medical history and recent changes in physical function or cognition ) rehab            V/S: /75   Pulse (!) 108   Temp 97 8 °F (36 6 °C) (Tympanic)   Resp 18   Ht 5' 6" (1 676 m)   Wt 88 3 kg (194 lb 11 2 oz)   SpO2 97%   BMI 31 43 kg/m²           PAIN: Yes, Rating 2    9  PRIMARY DIAGNOSIS: Chest pain      Secondary Diagnosis:         Pacemaker: No      Internal Defib: No          Mental Health Diagnosis (if Applicable):    10  RESTRAINTS: No     11  RESPIRATORY NEEDS: None    12  ISOLATION/PRECAUTION: None    13  ALLERGY: Tobramycin; Penicillins; and Latex    14  SENSORY:       Vision Good    15  SKIN CONDITION: Yes:  Othermultiple healing wounds on back,buttocks and trunk    16  DIET: Regular    17  IV ACCESS: None    18   PERSONAL ITEMS SENT WITH PATIENT: None    19  ATTACHED DOCUMENTS: MUST ATTACH CURRENT MEDICATION INFORMATION Face Sheet, MAR, Medication Reconciliation, Diagnostic Studies, Labs, Respiratory Care, Advanced Directive, Code Status, Discharge Summary, PT Note, OT Note, ST Note and HX/PE    20  AT RISK ALERTS:Falls        HARM TO: N/A    21  WEIGHT BEARING STATUS:         Left Leg: None        Right Leg: None    22  MENTAL STATUS:Alert and Oriented    23  FUNCTION:        Walk: Not Able        Transfer: With Help        Toilet: With Help        Feed: Self    24  IMMUNIZATIONS/SCREENING:     Immunization History   Administered Date(s) Administered    DT (pediatric) 03/11/2004    Influenza 10/12/2015, 10/06/2017    Influenza Quadrivalent Preservative Free 3 years and older IM 11/11/2014, 10/12/2015, 10/26/2015, 09/07/2016, 10/06/2017    Influenza TIV (IM) 10/21/2013    Pneumococcal Conjugate 13-Valent 06/06/2017    Pneumococcal Polysaccharide PPV23 1960, 10/12/2015    Tdap 08/09/2012       25  BOWEL: Continent    26  BLADDER: Continent    27   SENDING FACILITY CONTACT: Johnice Fothergill Lack                  Title: RN        Unit: [address] [facility]        Phone: [phone] [address] (if known):        Title:        Unit:         Phone:         FORM PREFILLED BY (if applicable)       Title:       Unit:        Phone:         FORM COMPLETED BY William Silva RN      Title: RN      Phone: [phone]

## 2018-01-04 NOTE — PROGRESS NOTES
[facility] [facility] 66 [facility] 83 [facility] Practice Progress Note - Luz Young 65 y o  male MRN: [id number]    Unit/Bed#: [address] [place] [address] Encounter: [id number]      Assessment/Plan:  Chest pain  -likely secondary to ACS, improved with Nitro   -Myocardial Stress Test 5/19/17: moderate sized reversible perfusion defect on inferior wall possibly due to diaphragm attenuation, LVEF 55%, abnormal after pharmological vasodillation  -Troponins negative x 3  -continue with telemetry  -Nitrostat  4 mg SL q4h PRN  -cardiology consulted  -consider restarting Imdur 30 mg daily Metoprolol 25 mg 1/2 tablet daily  Metastatic Colon Cancer to Liver and bone  -stable, continue with pain management Dilaudid 2mg q4h and oxycodone 10 mg q6h prn  -Fetanyl patch 75 mcg 1 patch q3days  DM Type 2  -Iss with accuchecks  CAD  -s/p PCI x2; cardiac cath on 1/13: no evidence of CAD  -c/w ASA 81 mg and Plavix 75 mg  Hyperlipidemia  -stable, c/w Lovastatin 40 mg  Hypertension  -c/w Lopressor 25 mg and Zestril 10 mg  COPD  -c/w spiriva and Proair  BPH  -stable, c/w flomax   4 mg  Depression and Anxiety  -stable c/w paxil 30 mg  Tobacco dependence  -nicotine patch 21 mg  Global: cardiac diabetic diet, SCDs    Subjective:   Patient seen and examined at bedside  Reports chest pain improved with nitro  Still having chronic back pain, tolerable with pain medication  Objective:     Vitals: Blood pressure 122/79, pulse 72, temperature 97 7 °F (36 5 °C), temperature source Oral, resp  rate 18, height 5' 6" (1 676 m), weight 88 3 kg (194 lb 11 2 oz), SpO2 95 %  ,Body mass index is 31 43 kg/m²    Wt Readings from Last 3 Encounters:   01/03/18 88 3 kg (194 lb 11 2 oz)   12/20/17 90 5 kg (199 lb 8 3 oz)   12/20/17 90 3 kg (199 lb)       Intake/Output Summary (Last 24 hours) at 01/04/18 1056  Last data filed at 01/03/18 2300   Gross per 24 hour   Intake             2000 ml   Output                0 ml   Net             2000 ml       Physical Exam:   General: Pt is AAO x 3, not in any acute distress  Cardio: RRR, S1 and S2 +, no murmurs/rubs/gallops/clicks  Resp: CTA b/l, no wheezes/rales/rhonchi  Abdomen: soft, NT/ND, BS+  Extremities: no cyanosis or edema  PP 2+ b/l       Recent Results (from the past 24 hour(s))   ECG 12 lead    Collection Time: 01/03/18  9:08 PM   Result Value Ref Range    Ventricular Rate 117 BPM    Atrial Rate 117 BPM    NJ Interval 126 ms    QRSD Interval 82 ms    QT Interval 328 ms    QTC Interval 457 ms    P Axis 49 degrees    QRS Axis 34 degrees    T Wave Axis 52 degrees   Comprehensive metabolic panel    Collection Time: 01/03/18  9:25 PM   Result Value Ref Range    Sodium 136 136 - 145 mmol/L    Potassium 4 1 3 5 - 5 3 mmol/L    Chloride 97 (L) 100 - 108 mmol/L    CO2 28 21 - 32 mmol/L    Anion Gap 11 4 - 13 mmol/L    BUN 8 5 - 25 mg/dL    Creatinine 0 58 (L) 0 60 - 1 30 mg/dL    Glucose 134 65 - 140 mg/dL    Calcium 9 0 8 3 - 10 1 mg/dL    AST 78 (H) 5 - 45 U/L     (H) 12 - 78 U/L    Alkaline Phosphatase 488 (H) 46 - 116 U/L    Total Protein 6 8 6 4 - 8 2 g/dL    Albumin 2 3 (L) 3 5 - 5 0 g/dL    Total Bilirubin 0 20 0 20 - 1 00 mg/dL    eGFR 114 ml/min/1 73sq m   CBC and differential    Collection Time: 01/03/18  9:25 PM   Result Value Ref Range    WBC 7 40 4 80 - 10 80 Thousand/uL    RBC 3 90 (L) 4 70 - 6 10 Million/uL    Hemoglobin 11 0 (L) 14 0 - 18 0 g/dL    Hematocrit 33 8 (L) 42 0 - 52 0 %    MCV 87 82 - 98 fL    MCH 28 3 27 0 - 31 0 pg    MCHC 32 6 31 4 - 37 4 g/dL    RDW 14 6 11 6 - 15 1 %    MPV 7 2 (L) 8 9 - 12 7 fL    Platelets 611 (H) 112 - 400 Thousands/uL    Neutrophils Relative 66 43 - 75 %    Lymphocytes Relative 20 14 - 44 %    Monocytes Relative 11 4 - 12 %    Eosinophils Relative 3 0 - 6 %    Basophils Relative 1 0 - 1 %    Neutrophils Absolute 4 90 1 85 - 7 62 Thousands/µL    Lymphocytes Absolute 1 50 0 60 - 4 47 Thousands/µL    Monocytes Absolute 0 80 0 17 - 1 22 Thousand/µL    Eosinophils Absolute 0 20 0 00 - 0 61 Thousand/µL Basophils Absolute 0 00 0 00 - 0 10 Thousands/µL   Protime-INR    Collection Time: 01/03/18  9:25 PM   Result Value Ref Range    Protime 11 6 9 4 - 11 7 seconds    INR 1 10 0 86 - 1 16   APTT    Collection Time: 01/03/18  9:25 PM   Result Value Ref Range    PTT 28 23 - 35 seconds   Troponin I    Collection Time: 01/03/18  9:25 PM   Result Value Ref Range    Troponin I <0 02 <=0 04 ng/mL   B-type natriuretic peptide    Collection Time: 01/03/18  9:25 PM   Result Value Ref Range    NT-proBNP 78 <125 pg/mL   Lipase    Collection Time: 01/03/18  9:25 PM   Result Value Ref Range    Lipase 142 73 - 393 u/L   Magnesium    Collection Time: 01/03/18  9:25 PM   Result Value Ref Range    Magnesium 1 6 1 6 - 2 6 mg/dL   UA w Reflex to Microscopic    Collection Time: 01/03/18  9:55 PM   Result Value Ref Range    Color, UA Yellow     Clarity, UA Clear     Specific [place], UA 1 010 1 000 - 1 030    pH, UA 8 0 5 0 - 9 0    Leukocytes, UA Negative Negative    Nitrite, UA Negative Negative    Protein, UA Negative Negative mg/dl    Glucose, UA Negative Negative mg/dl    Ketones, UA Negative Negative mg/dl    Urobilinogen, UA 0 2 0 2, 1 0 E U /dl E U /dl    Bilirubin, UA Negative Negative    Blood, UA Negative Negative   Troponin I    Collection Time: 01/04/18  1:27 AM   Result Value Ref Range    Troponin I <0 02 <=0 04 ng/mL   Fingerstick Glucose (POCT)    Collection Time: 01/04/18  1:28 AM   Result Value Ref Range    POC Glucose 75 65 - 140 mg/dl   Troponin I    Collection Time: 01/04/18  4:51 AM   Result Value Ref Range    Troponin I <0 02 <=0 04 ng/mL   Basic metabolic panel    Collection Time: 01/04/18  4:52 AM   Result Value Ref Range    Sodium 137 136 - 145 mmol/L    Potassium 4 0 3 5 - 5 3 mmol/L    Chloride 101 100 - 108 mmol/L    CO2 27 21 - 32 mmol/L    Anion Gap 9 4 - 13 mmol/L    BUN 7 5 - 25 mg/dL    Creatinine 0 48 (L) 0 60 - 1 30 mg/dL    Glucose 100 65 - 140 mg/dL    Glucose, Fasting 100 (H) 65 - 99 mg/dL    Calcium 8 9 8 3 - 10 1 mg/dL    eGFR 123 ml/min/1 73sq m   Magnesium    Collection Time: 01/04/18  4:52 AM   Result Value Ref Range    Magnesium 1 5 (L) 1 6 - 2 6 mg/dL   Phosphorus    Collection Time: 01/04/18  4:52 AM   Result Value Ref Range    Phosphorus 4 0 2 7 - 4 5 mg/dL   CBC (With Platelets)    Collection Time: 01/04/18  4:52 AM   Result Value Ref Range    WBC 6 60 4 80 - 10 80 Thousand/uL    RBC 3 58 (L) 4 70 - 6 10 Million/uL    Hemoglobin 10 2 (L) 14 0 - 18 0 g/dL    Hematocrit 31 5 (L) 42 0 - 52 0 %    MCV 88 82 - 98 fL    MCH 28 5 27 0 - 31 0 pg    MCHC 32 4 31 4 - 37 4 g/dL    RDW 15 7 (H) 11 6 - 15 1 %    Platelets 406 537 - 364 Thousands/uL    MPV 6 3 (L) 8 9 - 12 7 fL   Fingerstick Glucose (POCT)    Collection Time: 01/04/18  7:17 AM   Result Value Ref Range    POC Glucose 94 65 - 140 mg/dl       Current Facility-Administered Medications   Medication Dose Route Frequency Provider Last Rate Last Dose    baclofen tablet 10 mg  10 mg Oral BID Kristin Flynn MD   10 mg at 01/04/18 0847    clopidogrel (PLAVIX) tablet 75 mg  75 mg Oral Daily Kristin Flynn MD   75 mg at 01/04/18 0846    enoxaparin (LOVENOX) subcutaneous injection 40 mg  40 mg Subcutaneous Daily Kristin Flynn MD   40 mg at 01/04/18 0848    [START ON 1/5/2018] famotidine (PEPCID) tablet 40 mg  40 mg Oral Daily Kristin Flynn MD        fentaNYL (DURAGESIC) 75 mcg/hr TD 72 hr patch 1 patch  1 patch Transdermal Q72H Kristin Flynn MD   1 patch at 01/04/18 0603    HYDROmorphone (DILAUDID) tablet 2 mg  2 mg Oral Q6H PRN Kristin Flynn MD   2 mg at 01/04/18 0052    insulin lispro (HumaLOG) 100 units/mL subcutaneous injection 1-6 Units  1-6 Units Subcutaneous TID AC Kristin Flynn MD        insulin lispro (HumaLOG) 100 units/mL subcutaneous injection 1-6 Units  1-6 Units Subcutaneous HS Kristin Flynn MD        lisinopril (ZESTRIL) tablet 10 mg  10 mg Oral QAM Kristin Flynn MD   10 mg at 01/04/18 0848    nitroglycerin (NITROSTAT) SL tablet 0 4 mg  0 4 mg Sublingual PRN Brenda Chaves MD        oxyCODONE (ROXICODONE) immediate release tablet 10 mg  10 mg Oral Q6H PRN Brenda Chaves MD   10 mg at 01/04/18 0559    PARoxetine (PAXIL) tablet 30 mg  30 mg Oral Daily Brenda Chaves MD   30 mg at 01/04/18 0847    pravastatin (PRAVACHOL) tablet 40 mg  40 mg Oral Daily With Santiago Sandhu MD        tamsulosin [facility]) capsule 0 4 mg  0 4 mg Oral QAM Brenda Chaves MD   0 4 mg at 01/04/18 0846    tiotropium (SPIRIVA) capsule for inhaler 18 mcg  18 mcg Inhalation Daily Brenda Chaves MD   18 mcg at 01/04/18 0854       Invasive Devices     Peripheral Intravenous Line            Peripheral IV 01/03/18 Left Antecubital 1 day                Darlene Bueno MD

## 2018-01-04 NOTE — H&P
H&P Exam - Deja Reynoso 65 y o  male MRN: [id number]  Unit/Bed#: ED 09 Encounter: [id number]      65year old male with PMH of Hypertension, Hyperlipidemia , COPD, CAD, BPH, Depression with Anxiety, Spinal Stenosis, and Metastatic Disease of the Colon presents with chest pain  Patient will be admitted to the medical/surgical floor and will be placed under observational status under the service of Dr Alcira Acosta  Patient is expected to stay less than 2 midnights      Assessment/Plan:    Chest Pain:  · Likely 2/2 Acute Coronary Syndrome  · Myocardial Stress on 5/19/17:   · No chest pain during stress, Stress ECG was equivocal for ischemia, Moderate-sized, partially reversible myocardial perfusion defect of the inferior wall possibly due to diaphragm attenuation, LVEF 55%, Abnormal study after pharmacologic vasodilation  · Troponin on admission: <0 02; will obtain Troponin x2 and trend q3h  · Will monitor with Telemetry  · Continue Nitrostat 0 4mg SL q4h prn  · Consult Cardiology    Hx of Metastatic Colon Cancer:  · Metastasis to Lung and Liver and Bone  · Stable; continue with Pain Management:   · Severe Pain: Dilaudid 2mg q4h prn and Oxycodone 10mg q6h prn  · Fentanyl Patch 75mcg 1x every 3 days    Diabetes Mellitus:  · Blood Glucose on admission: 134  · Start ISS with Accucheck    CAD:  · s/p PCIx2; Cardiac Cath on 1/13/16: No evidence of CAD  · Stable; continue on home medication: ASA 81mg and Plavix 75mg PO qd    Hyperlipidemia:  · Stable; continue home medication: Lovastatin 40mg PO qd bedtime    Hypertension:  · BP on admission: 116/71  · Stable; continue home medication:  Lopressor 25mg PO qd and Zestril 10mg PO qd    COPD:  · Stable; continue on home medication:  Spiriva and ProAir    BPH:  · Stable; continue on home medication: Flomax 0 4mg PO qd morning    Depression/Anxiety:  · Stable; continue on home medication: Paxil 30mg PO qd bedtime    Tobacco Dependence:  · Start Nicoderm 21mg patch  · Counseled on Smoking Cessation    FEN:  · Cardiac + Diabetic Diet / Replete Electrolytes as Needed / SCD        Plan Discussed and Agreed upon with Senior Resident and Attending [address] on-call      History of Present Illness   Alejo Patel is a 65year old male with a PMH of Hypertension, Hyperlipidemia , COPD, CAD, BPH, Depression with Anxiety, Spinal Stenosis, and Metastatic Disease of the Colon who presents to the ED with a chief complaint of chest pain  As per patient, chest pain is located in the middle of his chest   Around 6:30pm, patient was eating dinner and started to experience severe chest discomfort  Patient states that the chest pain lasted for approximately 2-3 hours  Pain is described as sharp and stabbing in nature with radiation down the left arm  Associated symptoms include loss of sensation, weakness, numbness, tingling in the left upper extremity  Patient also noted to look pale and diaphoretic  Patient further states that he may have lost consciousness, or felt that his legs gave way from under him  Does not recollect if he actually passed out or not  Symptoms were improved with rest and made worse on exertion  Pain noted to be 10/10 in severity  Denies any fever, chills, headache, visual changes, difficulty with speech, shortness of breath, pain on inspiration, and abdominal pain  Of note, patient notes partial relief of chest pain after administration of nitroglycerin x2  ED Course:  Patient arrived to the ED  EKG obtained  Given 1L NS bolus and Nitroglycerin 0 4mg SL  Patient stabilized and moved to the medical floor  Review of Systems   Constitutional: Positive for diaphoresis and fatigue  Negative for chills and fever  Respiratory: Positive for chest tightness  Negative for cough, choking, shortness of breath and wheezing  Cardiovascular: Positive for chest pain  Negative for palpitations and leg swelling     Gastrointestinal: Negative for abdominal pain, constipation, diarrhea, nausea and vomiting  Genitourinary: Negative for difficulty urinating and flank pain  Musculoskeletal: Positive for back pain, gait problem and myalgias  Negative for arthralgias, joint swelling, neck pain and neck stiffness  Neurological: Positive for dizziness, syncope, weakness and light-headedness  Negative for tremors, seizures, facial asymmetry, speech difficulty, numbness and headaches  Historical Information   Past Medical History:   Diagnosis Date    Anxiety     CAD (coronary artery disease)     Cancer ([place] [address] )     lung-right    Cardiac disease     Chest pain     small mass in the heart-found on a stress test 5/18/17    [facility]     CPAP (continuous positive airway pressure) dependence     Diabetes mellitus ([place] [address] )     Hyperlipidemia     Hypertension     Myocardial infarction 2008    Obstructive sleep apnea on CPAP     Spinal stenosis     Stroke ([place] [address] ) 2008    heat stroke, minor stroke     Past Surgical History:   Procedure Laterality Date    ANGIOPLASTY  2009    2 stents    APPENDECTOMY      COLON SURGERY  2010    16 inches removed-obstruction    COLONOSCOPY      CORONARY STENT PLACEMENT  2009    ME COLSC FLX W/RMVL OF TUMOR POLYP LESION SNARE TQ N/A 5/23/2017    Procedure: COLONOSCOPY and biopsy and snare polypectomy ;  Surgeon: Danielle Mcdaniel MD;  Location: [place] [address] GI LAB; Service: Gastroenterology    ME EGD TRANSORAL BIOPSY SINGLE/MULTIPLE N/A 5/23/2017    Procedure: ESOPHAGOGASTRODUODENOSCOPY (EGD) and biopsy ;  Surgeon: Danielle Mcdaniel MD;  Location: [place] [address] GI LAB;   Service: Gastroenterology    TONSILLECTOMY       Social History   History   Alcohol Use No     History   Drug Use No     History   Smoking Status    Current Every Day Smoker    Packs/day: 1 00    Years: 40 00    Types: Cigarettes   Smokeless Tobacco    Never Used     Family History:   Family History   Problem Relation Age of Onset    Heart disease Mother      pacemaker   Yandy Casarez Diabetes Mother     COPD Father     Diabetes Father     Heart disease Father      pacemaker    Heart disease Brother      massive MI       Meds/Allergies   PTA meds:   Prior to Admission Medications   Prescriptions Last Dose Informant Patient Reported? Taking? CANDIDO MICROLET LANCETS lancets   Yes Yes   Sig: Candido Microlet Lancets Miscellaneous USE AS DIRECTED  Quantity: 1;  Refills: 6    Brando CHAMORRO ;  Started 2014 [id number] EA Package   HYDROmorphone (DILAUDID) 2 mg tablet   No Yes   Sig: Take 1 tablet by mouth every 4 (four) hours as needed for severe pain for up to 30 days Max Daily Amount: 12 mg   Insulin Pen Needle (RELION SHORT PEN NEEDLES) 31G X 8 MM MISC   Yes No   Sig: ReliOn Short Pen Needles 31G X 8 MM Miscellaneous USE AS DIRECTED ONCE DAILY AT BEDTIME  Quantity: 50;  Refills: 1    Fredy CHAMORRO ;  Started 2014 Active   PARoxetine (PAXIL) 30 mg tablet   Yes Yes   Si mg daily at bedtime PARoxetine HCl - 30 MG Oral Tablet TAKE ONE TABLET (30MG) BY MOUTH ONCE DAILY  Quantity: 30;  Refills: 5    Kwaku CHAMORRO O ;  Started 5-Dec-2014 Active    SURE COMFORT LANCETS 30G MISC   Yes Yes   Sig: Sure Comfort Lancets 30G Miscellaneous USE AS DIRECTED  Quantity: 1;  Refills: 0    Altaf CHAMORRO ; Meghana Merritt Miscellaneous Box   Tobramycin 28 MG CAPS   Yes Yes   Sig: Take by mouth 3 (three) times a day   acetaminophen (TYLENOL) 325 mg tablet   Yes Yes   Sig: Take 650 mg by mouth every 6 (six) hours as needed for mild pain   albuterol (2 5 mg/3 mL) 0 083 % nebulizer solution   Yes Yes   Sig: Albuterol Sulfate (2 5 MG/3ML) 0 083% Inhalation Nebulization Solution USE 1 UNIT DOSE EVERY 4-6 HOURS AS NEEDED FOR WHEEZING     Quantity: 1;  Refills: 3    Brando CHAMORRO ;  Started 2014 Active3 ML Angela    albuterol [facility] HFA) 90 mcg/act inhaler   Yes No   Si puff 2 (two) times a day ProAir  (90 Base) MCG/ACT Inhalation Aerosol Solution INHALE ONE TO TWO PUFFS BY MOUTH EVERY 4 TO 6 HOURS AS NEEDED  Quantity: 18; Refills: 5    Delle Screen M D ;  Started 16-Oct-2014 Active    baclofen 10 mg tablet   Yes Yes   Sig: Take 10 mg by mouth 2 (two) times a day     clindamycin (CLEOCIN) 300 MG capsule   Yes No   Sig: Take 300 mg by mouth 3 (three) times a day   clopidogrel (PLAVIX) 75 mg tablet   Yes Yes   Sig: Take 75 mg by mouth daily   famotidine (PEPCID) 40 MG tablet   Yes Yes   Sig: Take 40 mg by mouth daily   fentaNYL (DURAGESIC) 75 mcg/hr   No Yes   Sig: Place 1 patch on the skin every third day for 60 days Max Daily Amount: 1 patch   glipiZIDE (GLUCOTROL) 10 mg tablet   Yes Yes   Sig: GlipiZIDE 10 MG Oral Tablet TAKE TWO TABLETS BY MOUTH TWICE DAILY  Quantity: 120;  Refills: 5    Delle Screen M D ;  Started  Active   insulin detemir (LEVEMIR FLEXPEN) 100 Units/mL subcutaneous injection pen   Yes No   Si Units daily at bedtime Levemir FlexPen 100 UNIT/ML SOPN USE AS DIRECTED  INJECT 40 UNITS SUBCUTANEOUSLY AT BEDTIME  Quantity: 1;  Refills: 5    Nancy Little M D ;  Started 27-Aug-2014 Active3 ML Pen (5 Pens)    isosorbide mononitrate (IMDUR) 30 mg 24 hr tablet   No No   Sig: Take 1 tablet by mouth daily for 30 days   Patient taking differently: Take 30 mg by mouth every morning     lisinopril (ZESTRIL) 10 mg tablet   Yes Yes   Sig: Take 10 mg by mouth every morning   lovastatin (MEVACOR) 40 MG tablet   Yes Yes   Sig: Take 40 mg by mouth daily at bedtime   metFORMIN (GLUCOPHAGE) 500 mg tablet   Yes Yes   Sig: Take 500 mg by mouth 2 (two) times a day with meals  metoprolol tartrate (LOPRESSOR) 25 mg tablet   Yes Yes   Sig: Take 25 mg by mouth every morning   oxyCODONE (OXY-IR) 5 MG capsule   No Yes   Sig: Take 1 5 tablets every 6 hours as needed     Patient taking differently: 10 mg every 6 (six) hours as needed for severe pain     tamsulosin (FLOMAX) 0 4 mg   Yes Yes   Sig: Take 0 4 mg by mouth every morning     tiotropium (SPIRIVA) 18 mcg inhalation capsule   Yes Yes   Sig: Place 18 mcg into inhaler and inhale daily      Facility-Administered Medications: None     Allergies   Allergen Reactions    Tobramycin Rash    Penicillins Hives    Latex Rash       Objective   First Vitals:   Blood Pressure: 127/86 (01/03/18 2121)  Pulse: (!) 118 (01/03/18 2121)  Temperature: 98 °F (36 7 °C) (01/03/18 2121)  Temp Source: Tympanic (01/03/18 2121)  Respirations: 20 (01/03/18 2121)  Weight - Scale: 90 7 kg (200 lb) (01/03/18 2122)  SpO2: 98 % (01/03/18 2121)    Current Vitals:   Blood Pressure: 123/74 (01/03/18 2143)  Pulse: (!) 114 (01/03/18 2130)  Temperature: 98 °F (36 7 °C) (01/03/18 2121)  Temp Source: Tympanic (01/03/18 2121)  Respirations: (!) 29 (01/03/18 2130)  Weight - Scale: 90 7 kg (200 lb) (01/03/18 2122)  SpO2: 98 % (01/03/18 2130)      Intake/Output Summary (Last 24 hours) at 01/03/18 2243  Last data filed at 01/03/18 2128   Gross per 24 hour   Intake             1000 ml   Output                0 ml   Net             1000 ml       Invasive Devices     Peripheral Intravenous Line            Peripheral IV 12/22/17 Right Forearm 12 days                Physical Exam   Constitutional: He is oriented to person, place, and time  He appears well-developed and well-nourished  No distress  HENT:   Head: Normocephalic and atraumatic  Eyes: Conjunctivae and EOM are normal  Pupils are equal, round, and reactive to light  Neck: Normal range of motion  Neck supple  No JVD present  No tracheal deviation present  No thyromegaly present  Cardiovascular: Normal rate, regular rhythm, normal heart sounds and intact distal pulses  Exam reveals no gallop and no friction rub  No murmur heard  Pulmonary/Chest: Effort normal and breath sounds normal  No stridor  No respiratory distress  He has no wheezes  He has no rales  He exhibits no tenderness  Abdominal: Soft  Bowel sounds are normal  He exhibits no distension and no mass  There is no tenderness   There is no rebound and no guarding  Musculoskeletal: He exhibits no edema or deformity  Limited 2/2 Pain   Lymphadenopathy:     He has no cervical adenopathy  Neurological: He is alert and oriented to person, place, and time  Motor: Exam Limited 2/2 Pain  Bilateral U/E: 4/5 Motor  Left Lower Extremity: 1/5 Motor w  Intact Sensation  Right Lower Extremity: 3/5 Motor w  Decreased Sensation  **Inconsistent Sharp-Dull on testing for sensation**  Limited ROM   Skin: He is not diaphoretic  Psychiatric: He has a normal mood and affect         Lab Results:  Results for orders placed or performed during the hospital encounter of 01/03/18   Comprehensive metabolic panel   Result Value Ref Range    Sodium 136 136 - 145 mmol/L    Potassium 4 1 3 5 - 5 3 mmol/L    Chloride 97 (L) 100 - 108 mmol/L    CO2 28 21 - 32 mmol/L    Anion Gap 11 4 - 13 mmol/L    BUN 8 5 - 25 mg/dL    Creatinine 0 58 (L) 0 60 - 1 30 mg/dL    Glucose 134 65 - 140 mg/dL    Calcium 9 0 8 3 - 10 1 mg/dL    AST 78 (H) 5 - 45 U/L     (H) 12 - 78 U/L    Alkaline Phosphatase 488 (H) 46 - 116 U/L    Total Protein 6 8 6 4 - 8 2 g/dL    Albumin 2 3 (L) 3 5 - 5 0 g/dL    Total Bilirubin 0 20 0 20 - 1 00 mg/dL    eGFR 114 ml/min/1 73sq m   CBC and differential   Result Value Ref Range    WBC 7 40 4 80 - 10 80 Thousand/uL    RBC 3 90 (L) 4 70 - 6 10 Million/uL    Hemoglobin 11 0 (L) 14 0 - 18 0 g/dL    Hematocrit 33 8 (L) 42 0 - 52 0 %    MCV 87 82 - 98 fL    MCH 28 3 27 0 - 31 0 pg    MCHC 32 6 31 4 - 37 4 g/dL    RDW 14 6 11 6 - 15 1 %    MPV 7 2 (L) 8 9 - 12 7 fL    Platelets 879 (H) 194 - 400 Thousands/uL    Neutrophils Relative 66 43 - 75 %    Lymphocytes Relative 20 14 - 44 %    Monocytes Relative 11 4 - 12 %    Eosinophils Relative 3 0 - 6 %    Basophils Relative 1 0 - 1 %    Neutrophils Absolute 4 90 1 85 - 7 62 Thousands/µL    Lymphocytes Absolute 1 50 0 60 - 4 47 Thousands/µL    Monocytes Absolute 0 80 0 17 - 1 22 Thousand/µL    Eosinophils Absolute 0 20 0 00 - 0 61 Thousand/µL    Basophils Absolute 0 00 0 00 - 0 10 Thousands/µL   Protime-INR   Result Value Ref Range    Protime 11 6 9 4 - 11 7 seconds    INR 1 10 0 86 - 1 16   APTT   Result Value Ref Range    PTT 28 23 - 35 seconds   Troponin I   Result Value Ref Range    Troponin I <0 02 <=0 04 ng/mL   B-type natriuretic peptide   Result Value Ref Range    NT-proBNP 78 <125 pg/mL   UA w Reflex to Microscopic   Result Value Ref Range    Color, UA Yellow     Clarity, UA Clear     Specific [place], UA 1 010 1 000 - 1 030    pH, UA 8 0 5 0 - 9 0    Leukocytes, UA Negative Negative    Nitrite, UA Negative Negative    Protein, UA Negative Negative mg/dl    Glucose, UA Negative Negative mg/dl    Ketones, UA Negative Negative mg/dl    Urobilinogen, UA 0 2 0 2, 1 0 E U /dl E U /dl    Bilirubin, UA Negative Negative    Blood, UA Negative Negative   Lipase   Result Value Ref Range    Lipase 142 73 - 393 u/L   Magnesium   Result Value Ref Range    Magnesium 1 6 1 6 - 2 6 mg/dL   ECG 12 lead   Result Value Ref Range    Ventricular Rate 117 BPM    Atrial Rate 117 BPM    AZ Interval 126 ms    QRSD Interval 82 ms    QT Interval 328 ms    QTC Interval 457 ms    P Axis 49 degrees    QRS Axis 34 degrees    T Wave Axis 52 degrees       Imaging:  XR chest 1 view   Final Result      No acute cardiopulmonary process  Workstation performed: [id number]             EKG: Sinus Tachycardia    Code Status: DNR/DNI    Counseling / Coordination of Care: Total floor / unit time spent today 30 minutes           Herlinda Obrien MD

## 2018-01-04 NOTE — SOCIAL WORK
Met with pt  Currently at [facility] for short term rehab  Is plan is to return to his apt  Has elevator access  Has electric scooter and manual w/c  Has RW  Will follow to assist with transfer back to snf

## 2018-01-05 LAB
MRSA NOSE QL CULT: ABNORMAL
MRSA NOSE QL CULT: ABNORMAL

## 2018-01-05 NOTE — NURSING NOTE
Spoke with Amanda at [facility]  Patient left at 1950  No signs or symptoms of distress were noted  Patient medicated for pain at 1822, pain was reassessed at [facility] and patient denied pain at that time  All paperwork and prescriptions for Fentanyl patch and dialudid were given to ambulance drivers

## 2018-01-11 NOTE — MISCELLANEOUS
Provider Comments  Provider Comments:   NO SHOW, CALLED AND LEFT MESSAGE ON MACHINE  Maya Gasmen      Signatures   Electronically signed by : JAMAR Hodge ; Feb 1 2017  1:30PM EST                       (Author)

## 2018-01-11 NOTE — MISCELLANEOUS
Chief Complaint  Chief Complaint Free Text Note Form: 6/8/17 9:10 am  Phone call to patient for BRIJESH POOL communication  Left a message on machine for patient to call back  Mallory Hughes RN      History of Present Illness  TCM Communication [facility]: The patient is being contacted for follow-up after hospitalization  Hospital records were reviewed  He was hospitalized at [facility]  The date of admission: 6/5/17, date of discharge: 6/6/17  Diagnosis: chest pain  He was discharged to home  He scheduled a follow up appointment  Follow-up appointments with other specialists: Dr Dillan Hinojosa  Communication performed and completed by Rob Benson RN      Active Problems     1  Atherosclerotic heart disease of native coronary artery without angina pectoris (414 01)   (I25 10)   2  Benign essential hypertension (401 1) (I10)   3  BMI 40 0-44 9, adult (V85 41) (Z68 41)   4  Cancer (199 1) (C80 1)   5  Chronic obstructive pulmonary disease (496) (J44 9)   6  Cough (786 2) (R05)   7  Dental disorder (525 9) (K08 9)   8  Depression (311) (F32 9)   9  DMII (diabetes mellitus, type 2) (250 00) (E11 9)   10  Encounter for screening colonoscopy (V76 51) (Z12 11)   11  Esophageal reflux (530 81) (K21 9)   12  Gait disturbance (781 2) (R26 9)   13  Hyperlipidemia (272 4) (E78 5)   14  Morbid obesity due to excess calories (278 01) (E66 01)   15  Need for influenza vaccination (V04 81) (Z23)   16  Obstructive sleep apnea (327 23) (G47 33)   17  Panic attack (300 01) (F41 0)   18  Pre-op evaluation (V72 84) (Z01 818)   19  Pulmonary nodule (793 11) (R91 1)   20  Spinal stenosis (724 00) (M48 00)   21  Tooth infection (522 4) (K04 7)    Chronic back pain (724 5) (M54 9)       Mass of colon (569 89) (K63 9)       Liver masses (573 9) (R16 0)       Pulmonary mass (786 6) (R91 8)          Past Medical History    1  History of Ankle pain, unspecified laterality   2  History of Bilobar Prostatic Hyperplasia (600 90)   3   History of Cecum mass (569 9) (K63 9)   4  History of Chronic gingivitis (523 10) (K05 10)   5  History of Closed Fracture Of The Vertebral Column (805 8)   6  History of Costochondritis (733 6) (M94 0)   7  History of CT Lung Infiltrate (793 19)   8  History of Functional diarrhea (564 5) (K59 1)   9  History of Generalized Anxiety Disorder (V11 2)   10  History of acute bronchitis (V12 69) (Z87 09)   11  History of acute bronchitis (V12 69) (Z87 09)   12  History of benign prostatic hypertrophy (V13 89) (Z87 438)   13  History of chest pain (V13 89) (Z87 898)   14  History of chronic obstructive lung disease (V12 69) (Z87 09)   15  History of dizziness (V13 89) (Z87 898)   16  History of hypercholesterolemia (V12 29) (Z86 39)   17  History of insomnia (V13 89) (Z87 898)   18  History of low back pain (V13 59) (Z87 39)   19  History of nicotine dependence (V15 82) (Z87 891)   20  History of osteopenia (V13 59) (Z87 39)   21  History of sleep apnea (V13 89) (Z86 69)   22  History of varicella (V12 09) (Z86 19)   23  History of viral gastroenteritis (V12 09) (Z86 19)   24  History of viral infection (V12 09) (Z86 19)   25  History of viral pneumonia (V12 61) (Z87 01)   26  History of Hospital discharge follow-up (V67 59) (Z09)   27  History of Lactase deficiency (271 3) (E73 9)   28  History of Morbid or severe obesity due to excess calories (278 01) (E66 01)   29  History of Noncompliance of patient with other medical treatment and regimen (V15 81)    (Z91 19)   30  Old myocardial infarction (412) (I25 2)   31  History of Pain in hand (729 5) (M79 643)   32  History of Screening for diabetes mellitus (V77 1) (Z13 1)   33  History of Screening for heart disease (V81 2) (Z13 6)    Family History  Mother    1  Family history of Pacemaker Placement  Father    2  Family history of Pacemaker Placement  Family History    3  Family history of Asthma (V17 5)   4  Family history of Chronic Obstructive Pulmonary Disease   5   Family history of Diabetes Mellitus (V18 0)   6  Family history of Reported Family History Of Heart Disease    Social History    · Former smoker (R68 17) (N03 665)   · Never Drank Alcohol   · Nondependent tobacco use disorder (305 1) (F17 200)    Current Meds   1  Albuterol Sulfate (2 5 MG/3ML) 0 083% Inhalation Nebulization Solution; USE 1 UNIT   DOSE IN NEBULIZER EVERY 4 HOURS AS NEEDED; Therapy: 37MYU9993 to (Last Rx:66Yaq3302) Ordered   2  Baclofen 10 MG Oral Tablet; TAKE 1 TABLET TWICE DAILY  Requested for: 37ZAY2474;   Last Rx:23May2017 Ordered   3  Phuc Contour Test In Vitro Strip; USE ONE STRIP TO CHECK GLUCOSE THREE TIMES   DAILY; Therapy: 68Ylt7071 to (Marveen Cordial)  Requested for: 27YHV3041; Last   Rx:97Xpc9071 Ordered   4  Phuc Microlet Lancets Miscellaneous; USE AS DIRECTED; Therapy: 73KEP1967 to (Last Rx:75Csw7957) Ordered   5  GlipiZIDE 10 MG Oral Tablet; TAKE TWO TABLETS BY MOUTH TWICE DAILY; Therapy: 97QKP6283 to (Evaluate:06Jun2017)  Requested for: 07Apr2017; Last   Rx:07Apr2017 Ordered   6  Isosorbide Mononitrate 30 MG TB24; TAKE 1 TABLET DAILY; Therapy: (Clara Allen) to Recorded   7  Levemir FlexTouch 100 UNIT/ML Subcutaneous Solution Pen-injector; Inject 40 units   subcutaneously at bedtime as directed; Therapy: 60JFX8809 to (Evaluate:19Nov2017)  Requested for: 76KYP4508; Last   Rx:23May2017 Ordered   8  Lisinopril 5 MG Oral Tablet; TAKE ONE TABLET BY MOUTH ONCE DAILY; Therapy: 81DHX9331 to (Evaluate:19Nov2017)  Requested for: 27HWX7666; Last   Rx:23May2017 Ordered   9  Lovastatin 40 MG Oral Tablet; TAKE ONE TABLET BY MOUTH  DAILY IN THE EVENING; Therapy: 08VOV8627 to (05 12 73 93 30)  Requested for: 07Apr2017; Last   Rx:07Apr2017 Ordered   10  MetFORMIN HCl  MG Oral Tablet Extended Release 24 Hour; take one tablet by    mouth twice daily; Therapy: 99XOH4295 to (Evaluate:19Vci9606)  Requested for: 15Are0822; Last    Rx:12Iwi2566 Ordered   11   Omeprazole 20 MG Oral Capsule Delayed Release; TAKE 1 CAPSULE DAILY EVERY    MORNING BEFORE BREAKFAST; Therapy: (Duke Colvin) to Recorded   12  OxyCODONE HCl - 5 MG Oral Tablet; Take 1 5 tablets every 8 hours as needed for pain; Therapy: 07MPD6849 to (Evaluate:43Nie8590); Last TV:60AEP2900 Ordered   13  PARoxetine HCl - 30 MG Oral Tablet; TAKE ONE TABLET BY MOUTH ONCE DAILY; Therapy: 48CUK1316 to (Evaluate:60Egx7342)  Requested for: 04PGM2355; Last    Rx:87Gjz0353 Ordered   14  ProAir  (90 Base) MCG/ACT Inhalation Aerosol Solution; INHALE ONE TO TWO    PUFFS BY MOUTH EVERY 4 TO 6 HOURS AS NEEDED; Therapy: 62FMA0071 to (Evaluate:15Lcr1050)  Requested for: 62EBG1500; Last    Rx:59Phz6668 Ordered   15  ReliOn Short Pen Needles 31G X 8 MM Miscellaneous; USE AS DIRECTED ONCE DAILY    AT BEDTIME; Therapy: 05STD2587 to (Mikey Roy)  Requested for: 56SJE9571; Last    Rx:23Psk5528 Ordered   16  Sure Comfort Lancets 30G Miscellaneous; USE AS DIRECTED; Last Rx:31Gpk1709    Ordered   17  Tamsulosin HCl - 0 4 MG Oral Capsule; TAKE ONE CAPSULE BY MOUTH  DAILY IN THE    MORNING; Therapy: 50Xih8064 to (Mikey Roy)  Requested for: 07Apr2017; Last    Rx:07Apr2017 Ordered    Allergies    1  Penicillins   2  Tobramycin Sulfate in Saline SOLN    3  Latex   4  No Known Environmental Allergies    Health Management  DMII (diabetes mellitus, type 2)   *VB - Eye Exam; every 1 year; Next Due: 10Mko8385; Overdue  *VB - Foot Exam; every 1 year; Next Due: 00WEN2452; Overdue    Attending Note  Attending Note: Attending Note: I did not interview and examine the patient, I discussed the case with the Resident and reviewed the Resident's note and I agree with the Resident management plan as it was presented to me  Level of Participation: I was present in clinic, but did not examine the patient  I agree with the Resident's note        Message   Recorded as Task   Date: 06/07/2017 01:39 PM, Created By: Buzz Head   Task Name: Miscellaneous   Assigned To: Jass Swift   Regarding Patient: Gt Yi, Status: In Progress   Robby Hem - 07 Jun 2017 1:39 PM     TASK CREATED  discharged mickey 6/6   Irene Biswas - 07 Jun 2017 2:03 PM     TASK EDITED   Sherri Nieto - 08 Jun 2017 9:06 AM     TASK IN PROGRESS     Future Appointments    Date/Time Provider Specialty Site   07/06/2017 01:30 PM Dut, JAMAR Hare   Family Medicine [facility]   06/26/2017 03:30 PM Ching Valencia, [facility] Hematology [facility]     Signatures   Electronically signed by : JAMAR Goldstein ; Jun 9 2017 12:42PM EST                       (Author)    Electronically signed by : Loida Stevenson DO; Jun 23 2017  9:35AM EST                       (Author)

## 2018-01-11 NOTE — MISCELLANEOUS
Chief Complaint  Chief Complaint Free Text Note Form: JEN communication completed  Discharged from [facility] 5/19/17 for chest pain  Follow up appt  is scheduled for 5/25/17 at 2:45 pm with Dr Maricruz Machuca  Patient reported that he is scheduled 5/23/17 for a biopsy of his colon and 5/24/17 for a biopsy of his liver  Jhonny Matthews RN      History of Present Illness  TCM Communication [facility]: The patient is being contacted for follow-up after hospitalization  Hospital records were reviewed  He was hospitalized at [facility]  The date of admission: 5/18/17, date of discharge: 5/19/17  Diagnosis: chest pain  He was discharged to home  Medications reviewed and updated today  He scheduled a follow up appointment  Follow-up appointments with other specialists: Dr Cedric Rg, Dr Hanh Schofield  The patient is currently experiencing symptoms  Pain in chest, liver, kidney and shoulder  Same pain that brought him to the hospital and patient reports that it is the same as when he was admitted  Referrals Needed:  none  Communication performed and completed by Mallory Thibodeaux RN   HPI: PATIENT DID NOT SHOW UP FOR HIS JEN APPOINTMENT ON 6/5/17      Active Problems     1  Atherosclerotic heart disease of native coronary artery without angina pectoris (414 01)   (I25 10)   2  Benign essential hypertension (401 1) (I10)   3  BMI 40 0-44 9, adult (V85 41) (Z68 41)   4  Cancer (199 1) (C80 1)   5  Chronic back pain (724 5,338 29) (M54 9,G89 29)   6  Chronic obstructive pulmonary disease (496) (J44 9)   7  Cough (786 2) (R05)   8  Dental disorder (525 9) (K08 9)   9  Depression (311) (F32 9)   10  DMII (diabetes mellitus, type 2) (250 00) (E11 9)   11  Esophageal reflux (530 81) (K21 9)   12  Gait disturbance (781 2) (R26 9)   13  Hyperlipidemia (272 4) (E78 5)   14  Morbid obesity due to excess calories (278 01) (E66 01)   15  Need for influenza vaccination (V04 81) (Z23)   16  Obstructive sleep apnea (327 23) (G47 33)   17   Panic attack (300 01) (F41 0)   18  Pre-op evaluation (V72 84) (Z01 818)   19  Spinal stenosis (724 00) (M48 00)   20  Tooth infection (522 4) (K04 7)    Mass of colon (569 89) (K63 9)       Pulmonary nodule (793 11) (R91 1)       Liver masses (573 9) (R16 0)          Past Medical History    1  History of Ankle pain, unspecified laterality   2  History of Bilobar Prostatic Hyperplasia (600 90)   3  History of Cecum mass (569 9) (K63 9)   4  History of Chronic gingivitis (523 10) (K05 10)   5  History of Closed Fracture Of The Vertebral Column (805 8)   6  History of Costochondritis (733 6) (M94 0)   7  History of CT Lung Infiltrate (793 19)   8  History of Functional diarrhea (564 5) (K59 1)   9  History of Generalized Anxiety Disorder (V11 2)   10  History of acute bronchitis (V12 69) (Z87 09)   11  History of acute bronchitis (V12 69) (Z87 09)   12  History of benign prostatic hypertrophy (V13 89) (Z87 438)   13  History of chest pain (V13 89) (Z87 898)   14  History of chronic obstructive lung disease (V12 69) (Z87 09)   15  History of dizziness (V13 89) (Z87 898)   16  History of hypercholesterolemia (V12 29) (Z86 39)   17  History of insomnia (V13 89) (Z87 898)   18  History of low back pain (V13 59) (Z87 39)   19  History of nicotine dependence (V15 82) (Z87 891)   20  History of osteopenia (V13 59) (Z87 39)   21  History of sleep apnea (V13 89) (Z86 69)   22  History of varicella (V12 09) (Z86 19)   23  History of viral gastroenteritis (V12 09) (Z86 19)   24  History of viral infection (V12 09) (Z86 19)   25  History of viral pneumonia (V12 61) (Z87 01)   26  History of Hospital discharge follow-up (V67 59) (Z09)   27  History of Lactase deficiency (271 3) (E73 9)   28  History of Morbid or severe obesity due to excess calories (278 01) (E66 01)   29  History of Noncompliance of patient with other medical treatment and regimen (V15 81)    (Z91 19)   30  Old myocardial infarction (412) (I25 2)   31   History of Pain in hand (729 5) (A40 673)   32  History of Screening for diabetes mellitus (V77 1) (Z13 1)   33  History of Screening for heart disease (V81 2) (Z13 6)    Family History  Mother    1  Family history of Pacemaker Placement  Father    2  Family history of Pacemaker Placement  Family History    3  Family history of Asthma (V17 5)   4  Family history of Chronic Obstructive Pulmonary Disease   5  Family history of Diabetes Mellitus (V18 0)   6  Family history of Reported Family History Of Heart Disease    Social History    · Former smoker (W25 63) (T13 350)   · Never Drank Alcohol   · Nondependent tobacco use disorder (305 1) (F17 200)    Current Meds   1  Albuterol Sulfate (2 5 MG/3ML) 0 083% Inhalation Nebulization Solution; USE 1 UNIT   DOSE IN NEBULIZER EVERY 4 HOURS AS NEEDED; Therapy: 01KNN7087 to (Last Rx:14Tsl4583) Ordered   2  Baclofen 10 MG Oral Tablet; TAKE 1 TABLET TWICE DAILY; Therapy: (Ajit Ge) to Recorded   3  Phuc Contour Test In Vitro Strip; USE ONE STRIP TO CHECK GLUCOSE THREE TIMES   DAILY; Therapy: 66Hyy8288 to (Sunny Lacy)  Requested for: 02KEL5473; Last   Rx:94Rvz9489 Ordered   4  Phuc Microlet Lancets Miscellaneous; USE AS DIRECTED; Therapy: 46NHK9413 to (Last Rx:69Vty4493) Ordered   5  GlipiZIDE 10 MG Oral Tablet; TAKE TWO TABLETS BY MOUTH TWICE DAILY; Therapy: 42VTR3364 to (Evaluate:06Jun2017)  Requested for: 07Apr2017; Last   Rx:07Apr2017 Ordered   6  Isosorbide Mononitrate 30 MG TB24; TAKE 1 TABLET DAILY; Therapy: (Ajit [facility]) to Recorded   7  Levemir FlexTouch 100 UNIT/ML Subcutaneous Solution Pen-injector; Inject 40 units   subcutaneously at bedtime as directed; Therapy: 32IFU1431 to (Evaluate:33Vfc2765)  Requested for: 07Apr2017; Last   Rx:07Apr2017 Ordered   8  Lovastatin 40 MG Oral Tablet; TAKE ONE TABLET BY MOUTH  DAILY IN THE EVENING; Therapy: 35SOL1783 to (Jv Terri)  Requested for: 07Apr2017; Last   Rx:07Apr2017 Ordered   9   MetFORMIN HCl  MG Oral Tablet Extended Release 24 Hour; take one tablet by   mouth twice daily; Therapy: 34DAI5007 to (Evaluate:94Rky7671)  Requested for: 24Fsz8450; Last   Rx:95Bhm2899 Ordered   10  Omeprazole 20 MG Oral Capsule Delayed Release; TAKE 1 CAPSULE DAILY EVERY    MORNING BEFORE BREAKFAST; Therapy: (Bri Retana) to Recorded   11  OxyCODONE HCl - 5 MG Oral Tablet; Take 1 5 tablets every 8 hours as needed for pain; Therapy: 94TUV8376 to (Evaluate:54Ufy0228); Last OW:97IYC4924 Ordered   12  PARoxetine HCl - 30 MG Oral Tablet; TAKE ONE TABLET BY MOUTH ONCE DAILY; Therapy: 77JGJ0712 to (Evaluate:06Jze7722)  Requested for: 03XAD1000; Last    Rx:13Umj0318 Ordered   13  ProAir  (90 Base) MCG/ACT Inhalation Aerosol Solution; INHALE ONE TO TWO    PUFFS BY MOUTH EVERY 4 TO 6 HOURS AS NEEDED; Therapy: 52HEX7739 to (Jeanne Natarajan)  Requested for: 92MTR1418; Last    Rx:04Jan2017 Ordered   14  ReliOn Short Pen Needles 31G X 8 MM Miscellaneous; USE AS DIRECTED ONCE DAILY    AT BEDTIME; Therapy: 63XZY6073 to (Evaluate:30Jun2016)  Requested for: 22Mar2016; Last    Rx:22Mar2016 Ordered   15  Sure Comfort Lancets 30G Miscellaneous; USE AS DIRECTED; Last Rx:30Ybj8650    Ordered   16  Tamsulosin HCl - 0 4 MG Oral Capsule; TAKE ONE CAPSULE BY MOUTH  DAILY IN THE    MORNING; Therapy: 21Apr2015 to (Arvis Fix)  Requested for: 07Apr2017; Last    Rx:07Apr2017 Ordered    Allergies    1  Penicillins   2  Tobramycin Sulfate in Saline SOLN    3  Latex   4  No Known Environmental Allergies    Health Management  DMII (diabetes mellitus, type 2)   *VB - Eye Exam; every 1 year; Next Due: 51Qvl7840; Overdue  *VB - Foot Exam; every 1 year; Next Due: 85NCP4384; Overdue    Message   Recorded as Task   Date: 05/19/2017 09:46 PM, Created By: System   Task Name: Hospital JEN   Assigned To: Ayo Macias   Regarding Patient: Vianney Ugarte, Status:  In Progress   Comment:    System - 19 May 2017 9:46 PM     Patient discharged from hospital   Patient Name: Arias Esposito  Patient YOB: 1960  Discharge Date: 5/19/2017  Facility: [facility] - 22 May 2017 8:13 AM     TASK EDITED   Rose Marie Del Rosario - 22 May 2017 10:48 AM     TASK IN PROGRESS     Future Appointments    Date/Time Provider Specialty Site   06/12/2017 11:15 AM Jania Brown MD Hematology Oncology [facility]     Signatures   Electronically signed by :  JAMAR Nunez ; Jun 5 2017 10:33AM EST                       (Author)    Electronically signed by : JAMAR Soto ; Jun 5 2017 10:38AM EST                       (Co-author)

## 2018-01-11 NOTE — MISCELLANEOUS
Provider Comments  Provider Comments:   CALLED PT FOR NOT SHOW, L/M TO RESCHEDULED          Signatures   Electronically signed by : ALBERT Skelton; Oct 23 2017  8:19AM EST                       (Author)

## 2018-01-12 VITALS
WEIGHT: 249.8 LBS | HEIGHT: 66 IN | SYSTOLIC BLOOD PRESSURE: 128 MMHG | DIASTOLIC BLOOD PRESSURE: 70 MMHG | HEART RATE: 84 BPM | OXYGEN SATURATION: 96 % | BODY MASS INDEX: 40.15 KG/M2

## 2018-01-12 NOTE — MISCELLANEOUS
Message  pt called to cancel his appt today and his infusion appt for tomorrow  he states "i have the flu" and its getting worse  Pt states he was seen by PCP last week  febrile, cough, and body aches and states he thinks he needs to go to the hospital  I advised pt to report to ED  he agreed  Dr Navarro Generous aware  Active Problems    1  Acute bronchitis (466 0) (J20 9)   2  Atherosclerotic heart disease of native coronary artery without angina pectoris (414 01)   (I25 10)   3  Benign essential hypertension (401 1) (I10)   4  BMI 40 0-44 9, adult (V85 41) (Z68 41)   5  BPH (benign prostatic hyperplasia) (600 00) (N40 0)   6  Cancer (199 1) (C80 1)   7  Chemotherapy-induced nausea (787 02,E933 1) (R11 0,T45  1X5A)   8  Chronic back pain (724 5,338 29) (M54 9,G89 29)   9  Chronic obstructive pulmonary disease (496) (J44 9)   10  Dental disorder (525 9) (K08 9)   11  Depression (311) (F32 9)   12  Diabetes mellitus with hyperglycemia (250 00) (E11 65)   13  Diarrhea due to drug (787 91,E980 5) (K52 1)   14  Encounter for screening colonoscopy (V76 51) (Z12 11)   15  Esophageal reflux (530 81) (K21 9)   16  Gait disturbance (781 2) (R26 9)   17  Hyperlipidemia (272 4) (E78 5)   18  Influenza vaccination given (V04 81) (Z23)   19  Liver masses (573 9) (R16 0)   20  Long-term insulin use (V58 67) (Z79 4)   21  Malignant neoplasm of ascending colon (153 6) (C18 2)   22  Mass of colon (569 89) (K63 9)   23  Morbid obesity due to excess calories (278 01) (E66 01)   24  Obstructive sleep apnea (327 23) (G47 33)   25  Panic attack (300 01) (F41 0)   26  Pulmonary mass (786 6) (R91 8)   27  Pulmonary nodule (793 11) (R91 1)   28  Spinal stenosis (724 00) (M48 00)    Current Meds   1  Albuterol Sulfate (2 5 MG/3ML) 0 083% Inhalation Nebulization Solution; USE 1 UNIT   DOSE IN NEBULIZER EVERY 4 HOURS AS NEEDED; Therapy: 95KML1799 to (Last Rx:20Zfk4846) Ordered   2   Albuterol Sulfate (2 5 MG/3ML) 0 083% Inhalation Nebulization Solution; USE 1 UNIT   DOSE IN NEBULIZER EVERY 4 HOURS AS NEEDED; Therapy: 29LTB3289 to (Last Rx:16Oct2017) Ordered   3  Baclofen 10 MG Oral Tablet; TAKE 1 TABLET TWICE DAILY  Requested for: 50YJM3658;   Last Rx:91Set0622 Ordered   4  Phuc Contour Test In Vitro Strip; USE ONE STRIP TO CHECK GLUCOSE THREE TIMES   DAILY; Therapy: 17Hcm3525 to (Evaluate:38Wef6475)  Requested for: 20COG3801; Last   Rx:36Gwy2469 Ordered   5  Phuc Microlet Lancets Miscellaneous; USE AS DIRECTED; Therapy: 00TZV3187 to (Last Rx:63Zds2136) Ordered   6  Benzonatate 100 MG Oral Capsule; TAKE 1 CAPSULE TWICE DAILY AS NEEDED; Therapy: 03LZY7245 to (21 [phone])  Requested for: 27IJU7326; Last   Rx:16Oct2017 Ordered   7  GlipiZIDE 10 MG Oral Tablet; TAKE TWO TABLETS BY MOUTH TWICE DAILY; Therapy: 04YSD5834 to (Evaluate:06Jun2017)  Requested for: 07Apr2017; Last   Rx:07Apr2017 Ordered   8  GuaiFENesin 100 MG/5ML Oral Solution; TAKE 10 ML EVERY 4 HOURS AS DIRECTED; Therapy: 15RON1750 to ()  Requested for: 52GBW6339; Last   Rx:20Oct2017 Ordered   9  Isosorbide Mononitrate ER 30 MG Oral Tablet Extended Release 24 Hour; TAKE 1   TABLET ONCE DAILY  Requested for: 86ZFY7882; Last Rx:56Zsj0621 Ordered   10  Levemir FlexTouch 100 UNIT/ML Subcutaneous Solution Pen-injector; Inject 40 units    subcutaneously at bedtime as directed; Therapy: 03ULW5789 to (Evaluate:19Nov2017)  Requested for: 95WZJ3588; Last    Rx:92Kdt4801 Ordered   11  Lisinopril 5 MG Oral Tablet; TAKE ONE TABLET BY MOUTH ONCE DAILY; Therapy: 90ULR2778 to (Evaluate:32Laf1569)  Requested for: 55ZNU1953; Last    Rx:14Jun2017 Ordered   12  Lovastatin 40 MG Oral Tablet; TAKE ONE TABLET BY MOUTH  DAILY IN THE EVENING; Therapy: 91UGV8749 to ([phone])  Requested for: 07Apr2017; Last    Rx:07Apr2017 Ordered   13  MetFORMIN HCl  MG Oral Tablet Extended Release 24 Hour; take one tablet by    mouth twice daily;     Therapy: 57XUS5950 to (Evaluate:86Vmy7265)  Requested for: 32Gkh9776; Last    Rx:50Kvc1919 Ordered   14  Metoprolol Tartrate 25 MG Oral Tablet; 1/2 tablet daily; Therapy: 51JJN8911 to (Last Rx:26Jun2017); Status: ACTIVE - Retrospective By Protocol    Authorization Ordered   15  Nebulizer/Adult Mask KIT; use as directed; Therapy: 27TLJ3358 to (Last Rx:16Oct2017)  Requested for: 25WMZ5215 Ordered   16  Omeprazole 20 MG Oral Capsule Delayed Release; TAKE 1 CAPSULE DAILY EVERY    MORNING BEFORE BREAKFAST; Therapy: (Jaja Bart) to Recorded   17  Ondansetron HCl - 8 MG Oral Tablet; Take one tablet every 8 hours as needed for    nausea; Therapy: 04EFF7141 to (Last Rx:19Jun2017)  Requested for: 60TUY5291; Status:    ACTIVE - Retrospective By Protocol Authorization Ordered   18  OxyCODONE HCl - 15 MG Oral Tablet; Take 1 tablet every 6-8 hours as needed for pain; Therapy: 00JYB8097 to (Evaluate:93Ssv5224); Last Rx:54Rvu5254 Ordered   19  Pantoprazole Sodium 40 MG Oral Tablet Delayed Release (Protonix); Take 1 tablet by    mouth  daily; Therapy: 28Ayx3478 to (Emi Crate)  Requested for: 11Aug2017; Last    Rx:11Aug2017 Ordered   20  PARoxetine HCl - 30 MG Oral Tablet (Paxil); TAKE ONE TABLET BY MOUTH ONCE    DAILY; Therapy: 98BYY0817 to (Evaluate:78Hkb5423)  Requested for: 10Aug2017; Last    Rx:10Aug2017 Ordered   21  PredniSONE 10 MG Oral Tablet; TAKE 3 TABLETS DAILY FOR 2 DAYS, 2 TABLETS    DAILY FOR 2 DAYS AND 1 TABLET DAILY FOR 2 DAYS, THEN STOP; Therapy: 40MAD8639 to (Last Rx:16Oct2017)  Requested for: 16Oct2017 Ordered   22  ProAir  (90 Base) MCG/ACT Inhalation Aerosol Solution; INHALE ONE TO TWO    PUFFS BY MOUTH EVERY 4 TO 6 HOURS AS NEEDED; Therapy: 86RJD4495 to (Evaluate:01Was3883)  Requested for: 21WXO3713; Last    Rx:87Bxy4420 Ordered   23  ReliOn Short Pen Needles 31G X 8 MM Miscellaneous; USE AS DIRECTED ONCE    DAILY AT BEDTIME;     Therapy: 60YBJ2239 to (Stacia Vazquez)  Requested for: 29ABE2180; Last    Rx:58Ljl3494 Ordered   24  Spiriva HandiHaler 18 MCG Inhalation Capsule; INHALE CONTENTS OF 1 CAPSULE    ONCE DAILY; Therapy: 53PDX1256 to (Last Rx:26Jun2017); Status: ACTIVE - Retrospective By Protocol    Authorization Ordered   25  Sure Comfort Lancets 30G Miscellaneous; USE AS DIRECTED; Last Rx:92Kgg2360    Ordered   26  Tamsulosin HCl - 0 4 MG Oral Capsule; TAKE ONE CAPSULE BY MOUTH ONCE DAILY    IN THE MORNING; Therapy: 21Apr2015 to (Evaluate:84Yhj6597)  Requested for: 10Aug2017; Last    Rx:10Aug2017 Ordered    Allergies    1  Penicillins   2  Tobramycin Sulfate in Saline SOLN    3  Latex   4   No Known Environmental Allergies    Signatures   Electronically signed by : Jack Carranza RN; Oct 30 2017  2:29PM EST                       (Author)

## 2018-01-12 NOTE — MISCELLANEOUS
Message   Recorded as Task   Date: 05/02/2017 08:09 PM, Created By: Charlie Jones   Task Name: Care Coordination   Assigned To: Flavia Cano   Regarding Patient: Zack Calle, Status: In Progress   CommentJuani Storey - 02 May 2017 8:09 PM     TASK CREATED  Caller: NAMAN, Sister; Care Coordination; [phone]  DR CANO - SHE IS PT'S SISTER FROM [place]  SHE WOULD LIKE TO SPEAK TO YOU ABOUT PT   SHE WANTS TO SPEAK TO YOU RIGHT AWAY  SHE WOULD [facility] 10:00 TOMORROW  [facility]  SHE IS HIS EMERGENCY CONTACT  PT IS ALL BESIDE  HIMSELF ABOUT HIS VISIT WITH YOU TODAY  Flavia Cano - 03 May 2017 12:26 PM     TASK IN PROGRESS   Grace Dixon - 03 May 2017 3:43 PM     TASK EDITED  PT SISTER IS CALLING BACK AND IS WORRIED BC BROTHER WAS UNSURE OF DETAILS OF CANCER  Flavia Cano - 03 May 2017 4:00 PM     TASK EDITED  I will contact family member after I Speak with patient about disclosing his information to relatives- I am in office in evening hours today- might not have time to call- will try to call this week   Grace Dixon - 03 May 2017 4:08 PM     TASK EDITED  DR CAROLE Jasmine PT SISTER ALSO ASKING ABOUT IF PT WOULD NEED HOME CARE OR HOSPICE AND  IF SO WOULD LIKE TO LOOK INTO [facility] FOR HIM IF HE IS AGREEABLE  THANKS   Flavia Cano - 03 May 2017 8:45 PM     TASK EDITED  called pt to discuss whether i can speak to his sister about his medical conditions   Flavia Cano - 03 May 2017 8:45 PM     TASK EDITED  left vm to call back   Charlie Jonse - 05 May 2017 12:06 PM     TASK EDITED  DR CANO - PT'S SISTER IS CALLING AGAIN  SHE SAID PT CALLED HER AND SAID YOU TRIED TO CALL PT   SHE WANTS YOU TO CALL HIM ON HIS CELL PHONE   NOT THE HOUSE NO  [phone]  PT IS IN BED  SHE IS LISTED AS HIM EMERGENCY CONTACT  SHE SAID THEY WOULD LIKE TO GET HOME  HEALTH AID AND NURSE  SHE WOULD ALSO LIKE A PHONE CALL FROM YOU  Charlie Jones - 05 May 2017 12:28 PM     TASK EDITED  DR CAROLE Jasmine PT CALLED AND SAID IT'S OK FOR YOU TO CALL HIS SISTERNAMAN  HE WOULD ALSO LIKE TO SPEAK TO YOU  HE IS REALLY CONFUSED ABOUT HIS DIAG  HE WOULD LIKE CALL BACK TODAY  Savannah Gómez - 08 May 2017 2:08 PM     TASK REASSIGNED: Previously Assigned To [facility],team   Flavia Cano - 08 May 2017 2:13 PM     TASK EDITED  called sister, Ja Hampton, no reply, left vm that i wanted to talk to her about pt- advised that i will try back mil later   Flavia Cano - 11 May 2017 9:33 AM     TASK EDITED  Patients sister, Km Martínez, came to the office last night around 630pm to talk to me about Edwina Dudley- he has given me permission to discuss his care with his sister, Km Martínez states that Edwina Dudley told her that he has end stage cancer and he's dying, states that he told her that doctors told him he's going to die and there's no hope- he told his sisters that he has cancer every where in his body and nothing can help him any more- sister states that pt very tearful and likely depressed about his self diagnosis  Km Martínez states that patient is also likely addicted to pain meds and all he cares about is his back pain and not his cancer- Km Martínez states that he told her that he doesn't care about going to GI or Oncologist since he's dying and only wants pain meds for his back  after listening to Km Martínez- i told her that we DO NOT have a diagnosis yet- i read all the CT scan abdomen/pelvis, CTA chest and CT lumbar results fromt what the radiologist wrote - i explaiened that at this time it is SUSPECTED that he likely has metastatic disease and the primary cancer MAY BE in the colon- i told her that we DO NOT have a definite diagnosis and hence i told him to go to his GI doctor in [address] and also make Heme/Onc appointment ASAP  Km Martínez told me that she came from [place] and finally made appiontment for her brother with Dr Arabella Madden and Dr Donnie Velasquez (GI)  she told me that he has colonoscopy next week and has appt with Dr Arabella Madden on Friday this week       I told Sera Harris to bring him to our office in 1-2 weeks after he has seen specialist to discuss about his depressive state and his back pain- Sera Harris told me that he does go to a pain management doctor regaring his back pain- i told her to call her pain management doctor and discuss regarding his imaging results and possible metastatic disease and see what they tell Luis Carlos Cleary about his back pain and if there's something they can help with the pain- if Isabel and Luis Carlos Cleary can't get appointment with pain managment then he can come here and we can help in the interim    I answered all of Isabel's questions regarding Maral's care and imaging- I spent over 35 mins discussing this  Sera Harris has our office numbers for further questions if she has any  Recorded as Task   Date: 05/11/2017 09:36 AM, Created By: Tena Sanchez   Task Name: Care Coordination   Assigned To: Riaz Packer   Regarding Patient: Bre Kirk, Status: Active   Comment:    Tena Sanchez - 11 May 2017 9:36 Santos Beltran  I saw this patient in office 1-2 weeks ago- suspected metastatic disease from Colon- he was non complaint with follow up- his sister flew in from [place] to help with care- she came to the office yesteryda to discuss his results- pt had given me permission to talk to her- i discussed all the results- she's setting up appoints with Dr Srini Hall and Dr Oscar Nevarez  I just wanted you to sign off that i spoke to her  thanks  Dut     Signatures   Electronically signed by :  JAMAR Montoya ; May 11 2017  9:34AM EST                       (Author)    Electronically signed by : JAMAR Rao ; May 15 2017 11:17AM EST                       (Review)

## 2018-01-13 VITALS
RESPIRATION RATE: 18 BRPM | DIASTOLIC BLOOD PRESSURE: 80 MMHG | BODY MASS INDEX: 34.59 KG/M2 | OXYGEN SATURATION: 98 % | HEART RATE: 98 BPM | HEIGHT: 66 IN | TEMPERATURE: 97.8 F | WEIGHT: 215.25 LBS | SYSTOLIC BLOOD PRESSURE: 130 MMHG

## 2018-01-13 VITALS
HEART RATE: 68 BPM | BODY MASS INDEX: 40.02 KG/M2 | HEIGHT: 66 IN | SYSTOLIC BLOOD PRESSURE: 142 MMHG | TEMPERATURE: 97.9 F | DIASTOLIC BLOOD PRESSURE: 84 MMHG | WEIGHT: 249 LBS | RESPIRATION RATE: 16 BRPM

## 2018-01-13 VITALS
HEART RATE: 84 BPM | DIASTOLIC BLOOD PRESSURE: 82 MMHG | WEIGHT: 223.11 LBS | BODY MASS INDEX: 35.86 KG/M2 | OXYGEN SATURATION: 94 % | TEMPERATURE: 99.1 F | SYSTOLIC BLOOD PRESSURE: 147 MMHG | HEIGHT: 66 IN | RESPIRATION RATE: 20 BRPM

## 2018-01-13 VITALS
SYSTOLIC BLOOD PRESSURE: 80 MMHG | TEMPERATURE: 97.9 F | HEIGHT: 66 IN | RESPIRATION RATE: 16 BRPM | BODY MASS INDEX: 37.61 KG/M2 | HEART RATE: 92 BPM | OXYGEN SATURATION: 97 % | DIASTOLIC BLOOD PRESSURE: 60 MMHG | WEIGHT: 234 LBS

## 2018-01-13 VITALS
BODY MASS INDEX: 37.93 KG/M2 | RESPIRATION RATE: 16 BRPM | SYSTOLIC BLOOD PRESSURE: 128 MMHG | OXYGEN SATURATION: 98 % | TEMPERATURE: 97.2 F | HEIGHT: 66 IN | DIASTOLIC BLOOD PRESSURE: 80 MMHG | HEART RATE: 92 BPM | WEIGHT: 236 LBS

## 2018-01-13 VITALS
OXYGEN SATURATION: 98 % | RESPIRATION RATE: 20 BRPM | HEART RATE: 104 BPM | BODY MASS INDEX: 34.72 KG/M2 | DIASTOLIC BLOOD PRESSURE: 70 MMHG | HEIGHT: 66 IN | SYSTOLIC BLOOD PRESSURE: 122 MMHG | WEIGHT: 216 LBS

## 2018-01-14 VITALS
WEIGHT: 216.38 LBS | HEART RATE: 95 BPM | SYSTOLIC BLOOD PRESSURE: 130 MMHG | TEMPERATURE: 97.4 F | HEIGHT: 66 IN | BODY MASS INDEX: 34.78 KG/M2 | OXYGEN SATURATION: 98 % | RESPIRATION RATE: 18 BRPM | DIASTOLIC BLOOD PRESSURE: 80 MMHG

## 2018-01-14 VITALS
RESPIRATION RATE: 16 BRPM | DIASTOLIC BLOOD PRESSURE: 78 MMHG | BODY MASS INDEX: 40.02 KG/M2 | WEIGHT: 249 LBS | HEIGHT: 66 IN | HEART RATE: 68 BPM | SYSTOLIC BLOOD PRESSURE: 134 MMHG | TEMPERATURE: 98 F

## 2018-01-14 VITALS
SYSTOLIC BLOOD PRESSURE: 128 MMHG | BODY MASS INDEX: 40.82 KG/M2 | HEART RATE: 82 BPM | TEMPERATURE: 97.2 F | WEIGHT: 254 LBS | HEIGHT: 66 IN | DIASTOLIC BLOOD PRESSURE: 82 MMHG | OXYGEN SATURATION: 94 % | RESPIRATION RATE: 16 BRPM

## 2018-01-14 VITALS
TEMPERATURE: 97 F | WEIGHT: 250 LBS | SYSTOLIC BLOOD PRESSURE: 140 MMHG | DIASTOLIC BLOOD PRESSURE: 84 MMHG | OXYGEN SATURATION: 97 % | BODY MASS INDEX: 40.35 KG/M2 | HEART RATE: 88 BPM

## 2018-01-14 VITALS
DIASTOLIC BLOOD PRESSURE: 80 MMHG | WEIGHT: 216 LBS | HEART RATE: 92 BPM | SYSTOLIC BLOOD PRESSURE: 120 MMHG | RESPIRATION RATE: 18 BRPM | TEMPERATURE: 96.6 F | HEIGHT: 66 IN | OXYGEN SATURATION: 98 % | BODY MASS INDEX: 34.72 KG/M2

## 2018-01-15 VITALS
HEIGHT: 66 IN | DIASTOLIC BLOOD PRESSURE: 78 MMHG | BODY MASS INDEX: 36.64 KG/M2 | SYSTOLIC BLOOD PRESSURE: 132 MMHG | WEIGHT: 228 LBS | RESPIRATION RATE: 18 BRPM | HEART RATE: 92 BPM | TEMPERATURE: 97.7 F

## 2018-01-15 VITALS
SYSTOLIC BLOOD PRESSURE: 140 MMHG | OXYGEN SATURATION: 97 % | RESPIRATION RATE: 16 BRPM | TEMPERATURE: 97.5 F | DIASTOLIC BLOOD PRESSURE: 80 MMHG | HEART RATE: 96 BPM

## 2018-01-15 VITALS
HEART RATE: 93 BPM | BODY MASS INDEX: 35.52 KG/M2 | OXYGEN SATURATION: 98 % | SYSTOLIC BLOOD PRESSURE: 128 MMHG | RESPIRATION RATE: 18 BRPM | DIASTOLIC BLOOD PRESSURE: 80 MMHG | HEIGHT: 66 IN | WEIGHT: 221 LBS

## 2018-01-15 NOTE — MISCELLANEOUS
Message   Recorded as Task   Date: 10/19/2017 09:30 AM, Created By: Charlie Jones   Task Name: Care Coordination   Assigned To: Timur Dotson   Regarding Patient: Zack Calle, Status: Active   CommentJuani Storey - 19 Oct 2017 9:30 AM     TASK CREATED  Caller: Self; Care Coordination; [phone] (Home)  PT SEEN OTHER DAY  HE GOT MEDICATION FOR MUCINEX CAPSULES  HE SAID THEY ARE NOT WORKING AND HE WANTS COUGH SYRUP  HE SAYS HIS THROAT HURTS AND IS BLEEDING A LITTLE BIT  HE IS NOT ANY BETTER  HE WANTS COUGH SYRUP  Keeley Tj - 19 Oct 2017 10:03 AM     TASK REASSIGNED: Previously Assigned To Timur Pérez - 20 Oct 2017 4:34 PM     TASK REASSIGNED: Previously Assigned To ezequiel Wesley Pritiben - 20 Oct 2017 5:48 PM     TASK REPLIED TO: Previously Assigned To Timur Dotson  Called patient to let him know that discontinued the Mucinex capsules and ordered a cough syrup  Left a message as patient was unavailable  Discussed the patient to call if he has any worsening symptoms go to the ED for further evaluation       Signatures   Electronically signed by : JAMAR Wade ; Oct 20 2017  5:48PM EST                       (Author)    Electronically signed by : ASHTYN Ray ; Oct 21 2017  2:49PM EST                       (Author)

## 2018-01-15 NOTE — MISCELLANEOUS
Message   Recorded as Task   Date: 06/01/2017 02:23 PM, Created By: Pierre Fisher   Task Name: Care Coordination   Assigned To: Gloria Summers   Regarding Patient: Shilpi Austin, Status: Active   Comment:    Pierre Fisher - 01 Jun 2017 2:23 PM     TASK CREATED  p/c from patient sister Herbert Whitten is on his communication form  She would like to discuss patient medical condition and can be reached @ 21 [phone]  Irene Trimble - 01 Jun 2017 2:50 PM     TASK EDITED  returned call to sister Emma Sheffield  reviewed dx and treatment plans  She was very concerned about who was going to manage patients pain  She stated he currently is seeing a pain management doctor in [facility] (not sure of name)  I advised as long as patient is under the care of pain management this office will not write rx for narcotics  If Mr Gage Christy is unhappy with current pain management physician, referral to Dr Arpan Mcclendon will be place  advised sister to call our office with any further concerns or questions  Active Problems    1  Atherosclerotic heart disease of native coronary artery without angina pectoris (414 01)   (I25 10)   2  Benign essential hypertension (401 1) (I10)   3  BMI 40 0-44 9, adult (V85 41) (Z68 41)   4  Cancer (199 1) (C80 1)   5  Chronic back pain (724 5,338 29) (M54 9,G89 29)   6  Chronic obstructive pulmonary disease (496) (J44 9)   7  Cough (786 2) (R05)   8  Dental disorder (525 9) (K08 9)   9  Depression (311) (F32 9)   10  DMII (diabetes mellitus, type 2) (250 00) (E11 9)   11  Encounter for screening colonoscopy (V76 51) (Z12 11)   12  Esophageal reflux (530 81) (K21 9)   13  Gait disturbance (781 2) (R26 9)   14  Hyperlipidemia (272 4) (E78 5)   15  Liver masses (573 9) (R16 0)   16  Mass of colon (569 89) (K63 9)   17  Morbid obesity due to excess calories (278 01) (E66 01)   18  Need for influenza vaccination (V04 81) (Z23)   19  Obstructive sleep apnea (327 23) (G47 33)   20   Panic attack (300 01) (F41 0)   21  Pre-op evaluation (V72 84) (Z01 818)   22  Pulmonary mass (786 6) (R91 8)   23  Pulmonary nodule (793 11) (R91 1)   24  Spinal stenosis (724 00) (M48 00)   25  Tooth infection (522 4) (K04 7)    Current Meds   1  Albuterol Sulfate (2 5 MG/3ML) 0 083% Inhalation Nebulization Solution; USE 1 UNIT   DOSE IN NEBULIZER EVERY 4 HOURS AS NEEDED; Therapy: 22LVJ9686 to (Last Rx:74Rro0763) Ordered   2  Baclofen 10 MG Oral Tablet; TAKE 1 TABLET TWICE DAILY  Requested for: 27BJS1544;   Last Rx:84Prw3656 Ordered   3  Phuc Contour Test In Vitro Strip; USE ONE STRIP TO CHECK GLUCOSE THREE TIMES   DAILY; Therapy: 06Fqm3076 to (26 782795)  Requested for: 47IJK9473; Last   Rx:13Jwa9971 Ordered   4  Phuc Microlet Lancets Miscellaneous; USE AS DIRECTED; Therapy: 97KQP0601 to (Last Rx:46Zij9983) Ordered   5  GlipiZIDE 10 MG Oral Tablet; TAKE TWO TABLETS BY MOUTH TWICE DAILY; Therapy: 73BJY2525 to (Evaluate:06Jun2017)  Requested for: 07Apr2017; Last   Rx:07Apr2017 Ordered   6  Isosorbide Mononitrate 30 MG TB24; TAKE 1 TABLET DAILY; Therapy: (Delgado Snyder) to Recorded   7  Levemir FlexTouch 100 UNIT/ML Subcutaneous Solution Pen-injector; Inject 40 units   subcutaneously at bedtime as directed; Therapy: 43WUQ8710 to (Evaluate:19Nov2017)  Requested for: 96MYS0557; Last   Rx:23May2017 Ordered   8  Lisinopril 5 MG Oral Tablet; TAKE ONE TABLET BY MOUTH ONCE DAILY; Therapy: 83JMN0334 to (Evaluate:19Nov2017)  Requested for: 60YBF9662; Last   Rx:23May2017 Ordered   9  Lovastatin 40 MG Oral Tablet; TAKE ONE TABLET BY MOUTH  DAILY IN THE EVENING; Therapy: 30IHY3261 to ()  Requested for: 07Apr2017; Last   Rx:07Apr2017 Ordered   10  MetFORMIN HCl  MG Oral Tablet Extended Release 24 Hour; take one tablet by    mouth twice daily; Therapy: 21SDP6881 to (Evaluate:31Zso5029)  Requested for: 26Aug2016; Last    Rx:26Aug2016 Ordered   11   Omeprazole 20 MG Oral Capsule Delayed Release; TAKE 1 CAPSULE DAILY EVERY    MORNING BEFORE BREAKFAST; Therapy: (Shira Hwangh) to Recorded   12  OxyCODONE HCl - 5 MG Oral Tablet; Take 1 5 tablets every 8 hours as needed for pain; Therapy: 31TBP1161 to (Evaluate:34Jef8431); Last OZ:79GYQ9968 Ordered   13  PARoxetine HCl - 30 MG Oral Tablet (Paxil); TAKE ONE TABLET BY MOUTH ONCE    DAILY; Therapy: 60JGV6164 to (Evaluate:56Kkw3624)  Requested for: 20QIS5286; Last    Rx:78Vct1112 Ordered   14  ProAir  (90 Base) MCG/ACT Inhalation Aerosol Solution; INHALE ONE TO TWO    PUFFS BY MOUTH EVERY 4 TO 6 HOURS AS NEEDED; Therapy: 72IFZ2743 to (Evaluate:50Xtk5123)  Requested for: 46MDE9111; Last    Rx:23May2017 Ordered   15  ReliOn Short Pen Needles 31G X 8 MM Miscellaneous; USE AS DIRECTED ONCE    DAILY AT BEDTIME; Therapy: 80EAY6591 to (Mak Montelongo)  Requested for: 36ZWN9028; Last    Rx:23May2017 Ordered   16  Sure Comfort Lancets 30G Miscellaneous; USE AS DIRECTED; Last Rx:41Llv4893    Ordered   17  Tamsulosin HCl - 0 4 MG Oral Capsule; TAKE ONE CAPSULE BY MOUTH  DAILY IN    THE MORNING; Therapy: 71Ars4926 to (Philippe Samuels)  Requested for: 07Apr2017; Last    Rx:98Sic1549 Ordered    Allergies    1  Penicillins   2  Tobramycin Sulfate in Saline SOLN    3  Latex   4   No Known Environmental Allergies    Signatures   Electronically signed by : Christal Morgan RN; Jun 1 2017  2:50PM EST                       (Author)

## 2018-01-15 NOTE — MISCELLANEOUS
Provider Comments  Provider Comments:   No show appointment      Signatures   Electronically signed by : Andrews Sherman, ; Nov 9 2017 10:48AM EST                       (Author)

## 2018-01-16 NOTE — MISCELLANEOUS
Message  Message Free Text Note Form: Patient came to the office today for pain med- states he's in a lot of pain from his back and hip- states he was given Oxy 5mg tabs on 6/12- was told to take 1 5 tabs every 8 hours but has been taking 2 5mg tabs every 4-6 hours for pain- i discussed with him about his pain- advised that we will start him on 10mg Oxycodone tabs every 6 hours- will give 60 tabs- that's 2 week supply- advised to NOT take more than 4 tabs daily - advised that we will NOT refill meds prior to 2 weeks from now- advised that we will re-evaluate and discuss about pain meds- i discussed with DR Son Sam- we may consider long acting pain meds such as Oxycontin vs Fentanyl patches next visit if pain is not well controlled- advised pt to return in 2 week- he understands and acknowledges understands- we may also consider pain contract at next appointment      Plan    1  OxyCODONE HCl - 10 MG Oral Tablet; 1 tablet every 4-6 hours as needed for pain    Signatures   Electronically signed by :  JAMAR Dawkins ; Jun 21 2017 12:33PM EST                       (Author)    Electronically signed by : ASHTYN Vines ; Jun 25 2017  3:45PM EST                       (Author)

## 2018-01-16 NOTE — MISCELLANEOUS
Provider Comments  Provider Comments:   CALLED PT REGARDING MISSED APPT, LM TO R/S /AT        Signatures   Electronically signed by : JAMAR Burger ; Dec 29 2016 10:45AM EST                       (Author)

## 2018-01-16 NOTE — MISCELLANEOUS
Message  Called pt on 3/11/16 regarding his recent discharge from the hospital  LM to call back if he needed help with his medications or has any other questions or problems about his discharge  Active Problems    1  Acute bronchitis (466 0) (J20 9)   2  Benign essential hypertension (401 1) (I10)   3  Cecum mass (569 9) (K63 9)   4  Chronic obstructive pulmonary disease (496) (J44 9)   5  Coronary artery disease (414 00) (I25 10)   6  Costochondritis (733 6) (M94 0)   7  Depression (311) (F32 9)   8  DMII (diabetes mellitus, type 2) (250 00) (E11 9)   9  Esophageal reflux (530 81) (K21 9)   10  Flu vaccine need (V04 81) (Z23)   11  Hyperlipidemia (272 4) (E78 5)   12  Need for influenza vaccination (V04 81) (Z23)   13  Need for pneumococcal vaccine (V03 82) (Z23)   14  Nicotine dependence (305 1) (F17 200)   15  Obstructive sleep apnea (327 23) (G47 33)   16  Pain in hand (729 5) (M79 643)   17  Screening for diabetes mellitus (V77 1) (Z13 1)   18  Screening for heart disease (V81 2) (Z13 6)   19  Viral gastroenteritis (008 8) (A08 4)    Current Meds   1  Advair Diskus 250-50 MCG/DOSE Inhalation Aerosol Powder Breath Activated; INHALE   ONE DOSE BY MOUTH TWICE DAILY; Therapy: 85DDD8743 to (Jennifer Robledo)  Requested for: 04RDL4058; Last   Rx:40Ccc8333 Ordered   2  Albuterol Sulfate (2 5 MG/3ML) 0 083% Inhalation Nebulization Solution; USE 1 UNIT   DOSE EVERY 4-6 HOURS AS NEEDED FOR WHEEZING ; Last Rx:76New7604 Ordered   3  Aspir-81 81 MG Oral Tablet Delayed Release; TAKE 1 TABLET DAILY AS DIRECTED; Therapy: 52RVZ4263 to (Last Rx:07Qab8131) Ordered   4  Phuc Contour Test In Vitro Strip; USE ONE STRIP TO CHECK GLUCOSE THREE TIMES   DAILY; Therapy: 38Qmx8602 to (Evaluate:53Bmj7449)  Requested for: 45VDE3386; Last   Rx:27Jah5832 Ordered   5  Phuc Microlet Lancets Miscellaneous; USE AS DIRECTED; Therapy: 21AEO1223 to (Last Rx:59Zet6989) Ordered   6   Clopidogrel Bisulfate 75 MG Oral Tablet (Plavix); TAKE ONE TABLET BY MOUTH ONCE   DAILY; Therapy: 59XGP9715 to (Evaluate:20Mar2016)  Requested for: 25AND4252; Last   Rx:66Vjh0893 Ordered   7  Famotidine 40 MG Oral Tablet (Pepcid); TAKE ONE TABLET BY MOUTH ONCE DAILY; Therapy: 96GKC5575 to (Evaluate:38Oyv0783)  Requested for: 41HUA7756; Last   Rx:54Mrj1661 Ordered   8  GlipiZIDE 10 MG Oral Tablet; TAKE TWO TABLETS BY MOUTH TWICE DAILY; Therapy: 38NEM0520 to (Evaluate:20Mar2016)  Requested for: 444 14 907; Last   Rx:65Iqc3909 Ordered   9  Levemir FlexPen 100 UNIT/ML SOPN; USE AS DIRECTED  INJECT 40 UNITS   SUBCUTANEOUSLY AT BEDTIME; Therapy: 53Ffx9674 to (Batsheva Caruso)  Requested for: 26KTW2052; Last   Rx:59Qfm3833 Ordered   10  Levemir FlexTouch 100 UNIT/ML Subcutaneous Solution Pen-injector; INJECT 40    UNITS SUBCUTANEOUSLY AT BEDTIME AS DIRECTED; Therapy: 54DTW3432 to (Evaluate:04Jun2016)  Requested for: 54VND5078; Last    Rx:04Dbh0676 Ordered   11  Lisinopril 5 MG Oral Tablet; TAKE ONE TABLET BY MOUTH ONCE DAILY; Therapy: 51DZQ8298 to (Evaluate:19Apr2016)  Requested for: 88ILG6831; Last    Rx:22Oct2015 Ordered   12  Lovastatin 40 MG Oral Tablet; TAKE ONE TABLET BY MOUTH IN THE EVENING; Therapy: 03UMI5158 to (Evaluate:04Sep2016)  Requested for: 79DIT5271; Last    Rx:08Mar2016 Ordered   13  MetFORMIN HCl  MG Oral Tablet Extended Release 24 Hour; take one tablet by    mouth twice daily; Therapy: 39LNC6353 to (Evaluate:39Tky9483)  Requested for: 78Mrf2666; Last    Rx:19Oha7129 Ordered   14  Metoprolol Tartrate 50 MG Oral Tablet; TAKE ONE TABLET BY MOUTH ONCE DAILY; Therapy: 12Ukz9630 to (Evaluate:02Dlx4690)  Requested for: 09Qjo3384; Last    Rx:64Ijg2617 Ordered   15  MiraLax Oral Packet (Polyethylene Glycol 3350); Therapy: (Mirna Kunz) to Recorded   16  PARoxetine HCl - 30 MG Oral Tablet (Paxil); TAKE ONE TABLET (30MG) BY MOUTH    ONCE DAILY; Therapy: 52VVU1414 to (Evaluate:81Tnh2570)  Requested for: 04MGK1403;  Last Rx:64Xor6405 Ordered   17  Percocet TABS (Oxycodone-Acetaminophen); Therapy: ([place] [facility]) to Recorded   18  ProAir  (90 Base) MCG/ACT Inhalation Aerosol Solution; INHALE ONE TO TWO    PUFFS BY MOUTH EVERY 4 TO 6 HOURS AS NEEDED; Therapy: 57TRU2046 to (Evaluate:15Jun2016)  Requested for: 31AFR4121; Last    Rx:87Mbx3991 Ordered   19  ReliOn Short Pen Needles 31G X 8 MM Miscellaneous; USE AS DIRECTED ONCE    DAILY AT BEDTIME; Therapy: 97JPR3136 to (Gina Simon)  Requested for: 31SAO9563; Last    Rx:92Dia4383 Ordered   20  Spiriva HandiHaler 18 MCG Inhalation Capsule; INHALE ONE DOSE BY MOUTH ONCE    DAILY; Therapy: 34RBZ1833 to (Marcella Tovar)  Requested for: 70XNH0534; Last    Rx:23Nov2015 Ordered   21  Sure Comfort Lancets 30G Miscellaneous; USE AS DIRECTED; Last Rx:10Iop9076    Ordered   22  Tamsulosin HCl - 0 4 MG Oral Capsule; TAKE ONE CAPSULE BY MOUTH IN THE    MORNING; Therapy: 57Ikm9475 to (Evaluate:14Mar2016)  Requested for: 21Uei9718; Last    Rx:23Ocp6666 Ordered    Allergies    1  Penicillins   2  Tobramycin Sulfate in Saline SOLN    3   No Known Latex Allergies    Signatures   Electronically signed by : Koby Shultz RN; Mar 14 2016  8:16AM EST                       (Author)

## 2018-01-17 NOTE — MISCELLANEOUS
Message   Recorded as Task   Date: 11/01/2017 03:35 PM, Created By: Micheal Rojo   Task Name: Care Coordination   Assigned To: Reed Conte   Regarding Patient: Prema Hernandez, Status: Active   Comment:    Micheal Rojo - 01 Nov 2017 3:35 PM     TASK CREATED  p/c from patient stating he went to [facility] because he was not feeling well and thought he had the flu  The ER told the patient he did not have the flu and Mr Jonny Hogue got upset and signed himself out of the hospital  He stated he pulled the IVs out and left  Patient does not have a f/u appt with Duong Simon wanted to speak with you directly regarding the next step with treatment, he can be reached at [phone]  Irene Trimble - 09 Nov 2017 9:09 AM     TASK EDITED  patient will need follow up here in our office prior to next treatment  please schedule appt  thanks  Erika Vargas - 02 Nov 2017 9:34 AM     TASK REPLIED TO: Previously Assigned To Erika Vargas  Pt scheduled with Dr Sukh Pepe 11/9 @ 10:30am        Active Problems    1  Acute bronchitis (466 0) (J20 9)   2  Atherosclerotic heart disease of native coronary artery without angina pectoris (414 01)   (I25 10)   3  Benign essential hypertension (401 1) (I10)   4  BMI 40 0-44 9, adult (V85 41) (Z68 41)   5  BPH (benign prostatic hyperplasia) (600 00) (N40 0)   6  Cancer (199 1) (C80 1)   7  Chemotherapy-induced nausea (787 02,E933 1) (R11 0,T45  1X5A)   8  Chronic back pain (724 5,338 29) (M54 9,G89 29)   9  Chronic obstructive pulmonary disease (496) (J44 9)   10  Dental disorder (525 9) (K08 9)   11  Depression (311) (F32 9)   12  Diabetes mellitus with hyperglycemia (250 00) (E11 65)   13  Diarrhea due to drug (787 91,E980 5) (K52 1)   14  Encounter for screening colonoscopy (V76 51) (Z12 11)   15  Esophageal reflux (530 81) (K21 9)   16  Gait disturbance (781 2) (R26 9)   17  Hyperlipidemia (272 4) (E78 5)   18   Influenza vaccination given (V04 81) (Z23)   19  Liver masses (573 9) (R16 0)   20  Long-term insulin use (V58 67) (Z79 4)   21  Malignant neoplasm of ascending colon (153 6) (C18 2)   22  Mass of colon (569 89) (K63 9)   23  Morbid obesity due to excess calories (278 01) (E66 01)   24  Obstructive sleep apnea (327 23) (G47 33)   25  Panic attack (300 01) (F41 0)   26  Pulmonary mass (786 6) (R91 8)   27  Pulmonary nodule (793 11) (R91 1)   28  Spinal stenosis (724 00) (M48 00)    Current Meds   1  Albuterol Sulfate (2 5 MG/3ML) 0 083% Inhalation Nebulization Solution; USE 1 UNIT   DOSE IN NEBULIZER EVERY 4 HOURS AS NEEDED; Therapy: 08PIV0709 to (Last Rx:92Yby2644) Ordered   2  Albuterol Sulfate (2 5 MG/3ML) 0 083% Inhalation Nebulization Solution; USE 1 UNIT   DOSE IN NEBULIZER EVERY 4 HOURS AS NEEDED; Therapy: 94KDX9936 to (Last Rx:16Oct2017) Ordered   3  Baclofen 10 MG Oral Tablet; TAKE 1 TABLET TWICE DAILY  Requested for: 80ODI7782;   Last Rx:02Ebv8231 Ordered   4  Phuc Contour Test In Vitro Strip; USE ONE STRIP TO CHECK GLUCOSE THREE TIMES   DAILY; Therapy: 51Onf1134 to (Evaluate:60Jwo8006)  Requested for: 45RJI3382; Last   Rx:12Wsf1302 Ordered   5  Phuc Microlet Lancets Miscellaneous; USE AS DIRECTED; Therapy: 39JTR7534 to (Last Rx:41Vmg4413) Ordered   6  Benzonatate 100 MG Oral Capsule; TAKE 1 CAPSULE TWICE DAILY AS NEEDED; Therapy: 38VWD2831 to (21 234 )  Requested for: 51GRL6644; Last   Rx:16Oct2017 Ordered   7  GlipiZIDE 10 MG Oral Tablet; TAKE TWO TABLETS BY MOUTH TWICE DAILY; Therapy: 29YBE3918 to (Evaluate:06Jun2017)  Requested for: 28Kfl8783; Last   Rx:07Apr2017 Ordered   8  GuaiFENesin 100 MG/5ML Oral Solution; TAKE 10 ML EVERY 4 HOURS AS DIRECTED; Therapy: 78PTO8981 to (21 234 )  Requested for: 39BSJ4182; Last   Rx:36Ncd1918 Ordered   9  Isosorbide Mononitrate ER 30 MG Oral Tablet Extended Release 24 Hour; TAKE 1   TABLET ONCE DAILY  Requested for: 35HTW5096; Last Rx:39Hdx7482 Ordered   10   Levemir FlexTouch 100 UNIT/ML Subcutaneous Solution Pen-injector; Inject 40 units    subcutaneously at bedtime as directed; Therapy: 26ETW4301 to (Evaluate:19Nov2017)  Requested for: 62OUA9736; Last    Rx:20Hqs5419 Ordered   11  Lisinopril 5 MG Oral Tablet; TAKE ONE TABLET BY MOUTH ONCE DAILY; Therapy: 38ESQ7102 to (Evaluate:88Suk7347)  Requested for: 71OLW2840; Last    Rx:80Zrn0382 Ordered   12  Lovastatin 40 MG Oral Tablet; TAKE ONE TABLET BY MOUTH  DAILY IN THE EVENING; Therapy: 27LNQ0175 to (96 982800)  Requested for: 66Nrs4930; Last    Rx:13Dma9854 Ordered   13  MetFORMIN HCl  MG Oral Tablet Extended Release 24 Hour; take one tablet by    mouth twice daily; Therapy: 43YFK4405 to (Evaluate:11Fkl4106)  Requested for: 93Bzk6696; Last    Rx:21Fxu4289 Ordered   14  Metoprolol Tartrate 25 MG Oral Tablet; 1/2 tablet daily; Therapy: 07AEN9113 to (Last Rx:18Zox4062); Status: ACTIVE - Retrospective By Protocol    Authorization Ordered   15  Nebulizer/Adult Mask KIT; use as directed; Therapy: 83EAC5141 to (Last Rx:14Ryh6693)  Requested for: 26ZDA8345 Ordered   16  Omeprazole 20 MG Oral Capsule Delayed Release; TAKE 1 CAPSULE DAILY EVERY    MORNING BEFORE BREAKFAST; Therapy: (Moraima Mccord) to Recorded   17  Ondansetron HCl - 8 MG Oral Tablet; Take one tablet every 8 hours as needed for    nausea; Therapy: 86QKQ9557 to (Last Rx:19Jun2017)  Requested for: 57VEI4985; Status:    ACTIVE - Retrospective By Protocol Authorization Ordered   18  OxyCODONE HCl - 15 MG Oral Tablet; Take 1 tablet every 6-8 hours as needed for pain; Therapy: 71TRP4453 to (Evaluate:79Dfl2725); Last Rx:59Kjn7605 Ordered   19  Pantoprazole Sodium 40 MG Oral Tablet Delayed Release (Protonix); Take 1 tablet by    mouth  daily; Therapy: 11Aug2017 to (Hubert Roach)  Requested for: 11Aug2017; Last    Rx:11Aug2017 Ordered   20   PARoxetine HCl - 30 MG Oral Tablet (Paxil); TAKE ONE TABLET BY MOUTH ONCE    DAILY; Therapy: 86JVZ4052 to (Evaluate:48Jjk4260)  Requested for: 46Dkq2100; Last    Rx:10Aug2017 Ordered   21  PredniSONE 10 MG Oral Tablet; TAKE 3 TABLETS DAILY FOR 2 DAYS, 2 TABLETS    DAILY FOR 2 DAYS AND 1 TABLET DAILY FOR 2 DAYS, THEN STOP; Therapy: 69XQU7113 to (Last Rx:16Oct2017)  Requested for: 69Rte1447 Ordered   22  ProAir  (90 Base) MCG/ACT Inhalation Aerosol Solution; INHALE ONE TO TWO    PUFFS BY MOUTH EVERY 4 TO 6 HOURS AS NEEDED; Therapy: 36ZGP1850 to (Evaluate:69Eoi8093)  Requested for: 86CYK8819; Last    Rx:23May2017 Ordered   23  ReliOn Short Pen Needles 31G X 8 MM Miscellaneous; USE AS DIRECTED ONCE    DAILY AT BEDTIME; Therapy: 16QGJ8641 to ([facility])  Requested for: 53NMX8343; Last    Rx:23May2017 Ordered   24  Spiriva HandiHaler 18 MCG Inhalation Capsule; INHALE CONTENTS OF 1 CAPSULE    ONCE DAILY; Therapy: 08RYJ0332 to (Last Rx:26Jun2017); Status: ACTIVE - Retrospective By Protocol    Authorization Ordered   25  Sure Comfort Lancets 30G Miscellaneous; USE AS DIRECTED; Last Rx:33Bya9537    Ordered   26  Tamsulosin HCl - 0 4 MG Oral Capsule; TAKE ONE CAPSULE BY MOUTH ONCE DAILY    IN THE MORNING; Therapy: 69Sqv4563 to (Evaluate:63Fwp5743)  Requested for: 10Aug2017; Last    Rx:10Aug2017 Ordered    Allergies    1  Penicillins   2  Tobramycin Sulfate in Saline SOLN    3  Latex   4   No Known Environmental Allergies    Signatures   Electronically signed by : Carmelo Brito RN; Nov 2 2017  9:38AM EST                       (Author)

## 2018-01-17 NOTE — MISCELLANEOUS
Message  left message again for pt to call our office regarding missed appts and further treatment plans  certified letter will be sent to pt  Active Problems    1  Acute bronchitis (466 0) (J20 9)   2  Atherosclerotic heart disease of native coronary artery without angina pectoris (414 01)   (I25 10)   3  Benign essential hypertension (401 1) (I10)   4  BMI 40 0-44 9, adult (V85 41) (Z68 41)   5  BPH (benign prostatic hyperplasia) (600 00) (N40 0)   6  Cancer (199 1) (C80 1)   7  Chemotherapy-induced nausea (787 02,E933 1) (R11 0,T45  1X5A)   8  Chronic back pain (724 5,338 29) (M54 9,G89 29)   9  Chronic obstructive pulmonary disease (496) (J44 9)   10  Dental disorder (525 9) (K08 9)   11  Depression (311) (F32 9)   12  Diabetes mellitus with hyperglycemia (250 00) (E11 65)   13  Diarrhea due to drug (787 91,E980 5) (K52 1)   14  Encounter for screening colonoscopy (V76 51) (Z12 11)   15  Esophageal reflux (530 81) (K21 9)   16  Gait disturbance (781 2) (R26 9)   17  Hyperlipidemia (272 4) (E78 5)   18  Influenza vaccination given (V04 81) (Z23)   19  Liver masses (573 9) (R16 0)   20  Long-term insulin use (V58 67) (Z79 4)   21  Malignant neoplasm of ascending colon (153 6) (C18 2)   22  Mass of colon (569 89) (K63 9)   23  Morbid obesity due to excess calories (278 01) (E66 01)   24  Obstructive sleep apnea (327 23) (G47 33)   25  Panic attack (300 01) (F41 0)   26  Pulmonary mass (786 6) (R91 8)   27  Pulmonary nodule (793 11) (R91 1)   28  Spinal stenosis (724 00) (M48 00)    Current Meds   1  Albuterol Sulfate (2 5 MG/3ML) 0 083% Inhalation Nebulization Solution; USE 1 UNIT   DOSE IN NEBULIZER EVERY 4 HOURS AS NEEDED; Therapy: 08TYC1591 to (Last Rx:49Uqw4806) Ordered   2  Albuterol Sulfate (2 5 MG/3ML) 0 083% Inhalation Nebulization Solution; USE 1 UNIT   DOSE IN NEBULIZER EVERY 4 HOURS AS NEEDED; Therapy: 77CKW3916 to (Last Rx:16Oct2017) Ordered   3   Baclofen 10 MG Oral Tablet; TAKE 1 TABLET TWICE DAILY  Requested for: 10IIF9090;   Last Rx:55Mdu7800 Ordered   4  Phuc Contour Test In Vitro Strip; USE ONE STRIP TO CHECK GLUCOSE THREE TIMES   DAILY; Therapy: 04Bhg7111 to (Evaluate:14Lvf1515)  Requested for: 75EUL2060; Last   Rx:87Tgq8939 Ordered   5  Phuc Microlet Lancets Miscellaneous; USE AS DIRECTED; Therapy: 16VHE2732 to (Last Rx:85Csz2692) Ordered   6  Benzonatate 100 MG Oral Capsule; TAKE 1 CAPSULE TWICE DAILY AS NEEDED; Therapy: 41IZR7778 to (Que Oliver)  Requested for: 94VSX4250; Last   Rx:36Ilm2724 Ordered   7  GlipiZIDE 10 MG Oral Tablet; TAKE TWO TABLETS BY MOUTH TWICE DAILY; Therapy: 76OSO7922 to (Evaluate:06Jun2017)  Requested for: 07Apr2017; Last   Rx:07Apr2017 Ordered   8  GuaiFENesin 100 MG/5ML Oral Solution; TAKE 10 ML EVERY 4 HOURS AS DIRECTED; Therapy: 12AMB2351 to (05 12 73 93 30)  Requested for: 23VOM9015; Last   Rx:28Llb0025 Ordered   9  Isosorbide Mononitrate ER 30 MG Oral Tablet Extended Release 24 Hour; TAKE 1   TABLET ONCE DAILY  Requested for: 57QDH5316; Last Rx:27Jmq7390 Ordered   10  Levemir FlexTouch 100 UNIT/ML Subcutaneous Solution Pen-injector; Inject 40 units    subcutaneously at bedtime as directed; Therapy: 62AWI8415 to (Evaluate:19Nov2017)  Requested for: 20IEP0600; Last    Rx:68Hie0539 Ordered   11  Lisinopril 5 MG Oral Tablet; TAKE ONE TABLET BY MOUTH ONCE DAILY; Therapy: 15WNG1705 to (Evaluate:21Tfr4135)  Requested for: 78TQL5166; Last    Rx:14Jun2017 Ordered   12  Lovastatin 40 MG Oral Tablet; TAKE ONE TABLET BY MOUTH  DAILY IN THE EVENING; Therapy: 22TDN3112 to (23 564446)  Requested for: 07Apr2017; Last    Rx:07Apr2017 Ordered   13  MetFORMIN HCl  MG Oral Tablet Extended Release 24 Hour; take one tablet by    mouth twice daily; Therapy: 28EQP4861 to (Evaluate:67Cwa3658)  Requested for: 62Hrp5685; Last    Rx:50Eyt2587 Ordered   14  Metoprolol Tartrate 25 MG Oral Tablet; 1/2 tablet daily;     Therapy: 01OEA3127 to (Last FV:33KSX5923); Status: ACTIVE - Retrospective By Protocol    Authorization Ordered   15  Nebulizer/Adult Mask KIT; use as directed; Therapy: 10JZT1944 to (Last Rx:16Oct2017)  Requested for: 95GFM4578 Ordered   16  Omeprazole 20 MG Oral Capsule Delayed Release; TAKE 1 CAPSULE DAILY EVERY    MORNING BEFORE BREAKFAST; Therapy: (Ward Nelson) to Recorded   17  Ondansetron HCl - 8 MG Oral Tablet; Take one tablet every 8 hours as needed for    nausea; Therapy: 82NVY3290 to (Last Rx:19Jun2017)  Requested for: 34LXU9428; Status:    ACTIVE - Retrospective By Protocol Authorization Ordered   18  OxyCODONE HCl - 15 MG Oral Tablet; Take 1 tablet every 6-8 hours as needed for pain; Therapy: 35HXG8316 to (Evaluate:08Pvw5376); Last Rx:32Fyz3813 Ordered   19  Pantoprazole Sodium 40 MG Oral Tablet Delayed Release (Protonix); Take 1 tablet by    mouth  daily; Therapy: 78Nxp0997 to (Mario Oneil)  Requested for: 11Aug2017; Last    Rx:72Zfp1195 Ordered   20  PARoxetine HCl - 30 MG Oral Tablet (Paxil); TAKE ONE TABLET BY MOUTH ONCE    DAILY; Therapy: 89QWK3472 to (Evaluate:19Oxq1718)  Requested for: 10Aug2017; Last    Rx:10Aug2017 Ordered   21  PredniSONE 10 MG Oral Tablet; TAKE 3 TABLETS DAILY FOR 2 DAYS, 2 TABLETS    DAILY FOR 2 DAYS AND 1 TABLET DAILY FOR 2 DAYS, THEN STOP; Therapy: 46RTL9286 to (Last Rx:16Oct2017)  Requested for: 09Vwk8998 Ordered   22  ProAir  (90 Base) MCG/ACT Inhalation Aerosol Solution; INHALE ONE TO TWO    PUFFS BY MOUTH EVERY 4 TO 6 HOURS AS NEEDED; Therapy: 73LGO2334 to (Evaluate:51Xrf3079)  Requested for: 60JTH9666; Last    Rx:42Gzy6399 Ordered   23  ReliOn Short Pen Needles 31G X 8 MM Miscellaneous; USE AS DIRECTED ONCE    DAILY AT BEDTIME; Therapy: 58PTJ0972 to (Romeo Edwards)  Requested for: 19WHC4853; Last    Rx:55Tik8766 Ordered   24  Spiriva HandiHaler 18 MCG Inhalation Capsule; INHALE CONTENTS OF 1 CAPSULE    ONCE DAILY;     Therapy: 99IYD4958 to (Last LS:30IKM9431); Status: ACTIVE - Retrospective By Protocol    Authorization Ordered   25  Sure Comfort Lancets 30G Miscellaneous; USE AS DIRECTED; Last Rx:28Sep2015    Ordered   26  Tamsulosin HCl - 0 4 MG Oral Capsule; TAKE ONE CAPSULE BY MOUTH ONCE DAILY    IN THE MORNING; Therapy: 21Apr2015 to (Evaluate:59Qih9270)  Requested for: 30Dbr9862; Last    Rx:42Kcr1077 Ordered    Allergies    1  Penicillins   2  Tobramycin Sulfate in Saline SOLN    3  Latex   4   No Known Environmental Allergies    Signatures   Electronically signed by : Randell Boxer, RN; Nov 10 2017  9:46AM EST                       (Author)

## 2018-01-17 NOTE — MISCELLANEOUS
Message  pt was no show for appt today with dr Diana Sifuentes, left message at home and cell number to have patient call us back  Active Problems    1  Acute bronchitis (466 0) (J20 9)   2  Atherosclerotic heart disease of native coronary artery without angina pectoris (414 01)   (I25 10)   3  Benign essential hypertension (401 1) (I10)   4  BMI 40 0-44 9, adult (V85 41) (Z68 41)   5  BPH (benign prostatic hyperplasia) (600 00) (N40 0)   6  Cancer (199 1) (C80 1)   7  Chemotherapy-induced nausea (787 02,E933 1) (R11 0,T45  1X5A)   8  Chronic back pain (724 5,338 29) (M54 9,G89 29)   9  Chronic obstructive pulmonary disease (496) (J44 9)   10  Dental disorder (525 9) (K08 9)   11  Depression (311) (F32 9)   12  Diabetes mellitus with hyperglycemia (250 00) (E11 65)   13  Diarrhea due to drug (787 91,E980 5) (K52 1)   14  Encounter for screening colonoscopy (V76 51) (Z12 11)   15  Esophageal reflux (530 81) (K21 9)   16  Gait disturbance (781 2) (R26 9)   17  Hyperlipidemia (272 4) (E78 5)   18  Influenza vaccination given (V04 81) (Z23)   19  Liver masses (573 9) (R16 0)   20  Long-term insulin use (V58 67) (Z79 4)   21  Malignant neoplasm of ascending colon (153 6) (C18 2)   22  Mass of colon (569 89) (K63 9)   23  Morbid obesity due to excess calories (278 01) (E66 01)   24  Obstructive sleep apnea (327 23) (G47 33)   25  Panic attack (300 01) (F41 0)   26  Pulmonary mass (786 6) (R91 8)   27  Pulmonary nodule (793 11) (R91 1)   28  Spinal stenosis (724 00) (M48 00)    Current Meds   1  Albuterol Sulfate (2 5 MG/3ML) 0 083% Inhalation Nebulization Solution; USE 1 UNIT   DOSE IN NEBULIZER EVERY 4 HOURS AS NEEDED; Therapy: 36AFO2374 to (Last Rx:55Aby1902) Ordered   2  Albuterol Sulfate (2 5 MG/3ML) 0 083% Inhalation Nebulization Solution; USE 1 UNIT   DOSE IN NEBULIZER EVERY 4 HOURS AS NEEDED; Therapy: 51SCC1987 to (Last Rx:16Oct2017) Ordered   3   Baclofen 10 MG Oral Tablet; TAKE 1 TABLET TWICE DAILY  Requested for: 87ISW8796;   Last Rx:67Xws4879 Ordered   4  Phuc Contour Test In Vitro Strip; USE ONE STRIP TO CHECK GLUCOSE THREE TIMES   DAILY; Therapy: 14Zvs2533 to (Evaluate:52Pcs6517)  Requested for: 41OSW2945; Last   Rx:06Ndu8534 Ordered   5  Phuc Microlet Lancets Miscellaneous; USE AS DIRECTED; Therapy: 03AUX0061 to (Last Rx:93Gry0331) Ordered   6  Benzonatate 100 MG Oral Capsule; TAKE 1 CAPSULE TWICE DAILY AS NEEDED; Therapy: 16CTW9574 to (77 873 135)  Requested for: 22DTH0036; Last   Rx:50Edh1484 Ordered   7  GlipiZIDE 10 MG Oral Tablet; TAKE TWO TABLETS BY MOUTH TWICE DAILY; Therapy: 97TZW4714 to (Evaluate:06Jun2017)  Requested for: 07Apr2017; Last   Rx:07Apr2017 Ordered   8  GuaiFENesin 100 MG/5ML Oral Solution; TAKE 10 ML EVERY 4 HOURS AS DIRECTED; Therapy: 43BGU5658 to (77 873 135)  Requested for: 72JGS3672; Last   Rx:17Yld4010 Ordered   9  Isosorbide Mononitrate ER 30 MG Oral Tablet Extended Release 24 Hour; TAKE 1   TABLET ONCE DAILY  Requested for: 56ELP1865; Last Rx:35Tld5387 Ordered   10  Levemir FlexTouch 100 UNIT/ML Subcutaneous Solution Pen-injector; Inject 40 units    subcutaneously at bedtime as directed; Therapy: 80YCR5539 to (Evaluate:19Nov2017)  Requested for: 88DKK3776; Last    Rx:23May2017 Ordered   11  Lisinopril 5 MG Oral Tablet; TAKE ONE TABLET BY MOUTH ONCE DAILY; Therapy: 69NLP8037 to (Evaluate:79Usf8648)  Requested for: 82QLT7946; Last    Rx:14Jun2017 Ordered   12  Lovastatin 40 MG Oral Tablet; TAKE ONE TABLET BY MOUTH  DAILY IN THE EVENING; Therapy: 88JBG5959 to (Adriana Liu)  Requested for: 07Apr2017; Last    Rx:07Apr2017 Ordered   13  MetFORMIN HCl  MG Oral Tablet Extended Release 24 Hour; take one tablet by    mouth twice daily; Therapy: 79RYH3649 to (Evaluate:01Stp4000)  Requested for: 39Fdv3702; Last    Rx:19Rec8977 Ordered   14  Metoprolol Tartrate 25 MG Oral Tablet; 1/2 tablet daily;     Therapy: 21VZS5155 to (Last Rx:26Jun2017); Status: ACTIVE - Retrospective By Protocol    Authorization Ordered   15  Nebulizer/Adult Mask KIT; use as directed; Therapy: 77XQK5210 to (Last Rx:16Oct2017)  Requested for: 08XBC0866 Ordered   16  Omeprazole 20 MG Oral Capsule Delayed Release; TAKE 1 CAPSULE DAILY EVERY    MORNING BEFORE BREAKFAST; Therapy: (Xiomara Dooley) to Recorded   17  Ondansetron HCl - 8 MG Oral Tablet; Take one tablet every 8 hours as needed for    nausea; Therapy: 91HCJ3165 to (Last Rx:19Jun2017)  Requested for: 16JCT8448; Status:    ACTIVE - Retrospective By Protocol Authorization Ordered   18  OxyCODONE HCl - 15 MG Oral Tablet; Take 1 tablet every 6-8 hours as needed for pain; Therapy: 27XRG2854 to (Evaluate:97Jhk3467); Last Rx:35Ogn8535 Ordered   19  Pantoprazole Sodium 40 MG Oral Tablet Delayed Release (Protonix); Take 1 tablet by    mouth  daily; Therapy: 92Tvh2866 to (Malena Egan)  Requested for: 11Aug2017; Last    Rx:11Aug2017 Ordered   20  PARoxetine HCl - 30 MG Oral Tablet (Paxil); TAKE ONE TABLET BY MOUTH ONCE    DAILY; Therapy: 40ZCM7334 to (Evaluate:43Jnh4813)  Requested for: 44Elh4120; Last    Rx:10Aug2017 Ordered   21  PredniSONE 10 MG Oral Tablet; TAKE 3 TABLETS DAILY FOR 2 DAYS, 2 TABLETS    DAILY FOR 2 DAYS AND 1 TABLET DAILY FOR 2 DAYS, THEN STOP; Therapy: 10BIT3399 to (Last Rx:16Oct2017)  Requested for: 16Oct2017 Ordered   22  ProAir  (90 Base) MCG/ACT Inhalation Aerosol Solution; INHALE ONE TO TWO    PUFFS BY MOUTH EVERY 4 TO 6 HOURS AS NEEDED; Therapy: 94ZOX8257 to (Evaluate:86Jqx8279)  Requested for: 72WXX0821; Last    Rx:98Bxz3481 Ordered   23  ReliOn Short Pen Needles 31G X 8 MM Miscellaneous; USE AS DIRECTED ONCE    DAILY AT BEDTIME; Therapy: 24ZGK3591 to (Andrei Elliott)  Requested for: 48ZPW6246; Last    Rx:48Gep9503 Ordered   24  Spiriva HandiHaler 18 MCG Inhalation Capsule; INHALE CONTENTS OF 1 CAPSULE    ONCE DAILY;     Therapy: 96QCR6181 to (Last Rx:26Jun2017); Status: ACTIVE - Retrospective By Protocol    Authorization Ordered   25  Sure Comfort Lancets 30G Miscellaneous; USE AS DIRECTED; Last Rx:28Sep2015    Ordered   26  Tamsulosin HCl - 0 4 MG Oral Capsule; TAKE ONE CAPSULE BY MOUTH ONCE DAILY    IN THE MORNING; Therapy: 21Apr2015 to (Evaluate:52Qxz3423)  Requested for: 10Aug2017; Last    Rx:10Aug2017 Ordered    Allergies    1  Penicillins   2  Tobramycin Sulfate in Saline SOLN    3  Latex   4   No Known Environmental Allergies    Signatures   Electronically signed by : Lobo Adams RN; Nov 9 2017 11:31AM EST                       (Author)

## 2018-01-22 VITALS
RESPIRATION RATE: 24 BRPM | HEART RATE: 102 BPM | OXYGEN SATURATION: 95 % | WEIGHT: 218.5 LBS | TEMPERATURE: 97.1 F | BODY MASS INDEX: 35.28 KG/M2 | SYSTOLIC BLOOD PRESSURE: 124 MMHG | DIASTOLIC BLOOD PRESSURE: 88 MMHG

## 2018-01-22 VITALS
WEIGHT: 222.31 LBS | HEIGHT: 66 IN | BODY MASS INDEX: 35.73 KG/M2 | DIASTOLIC BLOOD PRESSURE: 78 MMHG | HEART RATE: 93 BPM | OXYGEN SATURATION: 97 % | RESPIRATION RATE: 22 BRPM | SYSTOLIC BLOOD PRESSURE: 130 MMHG

## 2018-01-23 VITALS
HEIGHT: 66 IN | SYSTOLIC BLOOD PRESSURE: 130 MMHG | HEART RATE: 126 BPM | TEMPERATURE: 97.6 F | DIASTOLIC BLOOD PRESSURE: 70 MMHG | BODY MASS INDEX: 32.78 KG/M2 | WEIGHT: 204 LBS | OXYGEN SATURATION: 96 % | RESPIRATION RATE: 20 BRPM

## 2018-01-23 NOTE — MISCELLANEOUS
Message  Patients friend called the infusion center to cancel today's appointment  Stated did not know if he was going to reschedule  left message for pt to call us  Active Problems    1  Acute bronchitis (466 0) (J20 9)   2  Atherosclerotic heart disease of native coronary artery without angina pectoris (414 01)   (I25 10)   3  Benign essential hypertension (401 1) (I10)   4  BMI 40 0-44 9, adult (V85 41) (Z68 41)   5  BPH (benign prostatic hyperplasia) (600 00) (N40 0)   6  Cancer (199 1) (C80 1)   7  Chemotherapy-induced nausea (787 02,E933 1) (R11 0,T45  1X5A)   8  Chronic back pain (724 5,338 29) (M54 9,G89 29)   9  Chronic obstructive pulmonary disease (496) (J44 9)   10  Dental disorder (525 9) (K08 9)   11  Depression (311) (F32 9)   12  Diabetes mellitus with hyperglycemia (250 00) (E11 65)   13  Diarrhea due to drug (787 91,E980 5) (K52 1)   14  Encounter for screening colonoscopy (V76 51) (Z12 11)   15  Esophageal reflux (530 81) (K21 9)   16  Gait disturbance (781 2) (R26 9)   17  Hyperlipidemia (272 4) (E78 5)   18  Influenza vaccination given (V04 81) (Z23)   19  Liver masses (573 9) (R16 0)   20  Long-term insulin use (V58 67) (Z79 4)   21  Malignant neoplasm of ascending colon (153 6) (C18 2)   22  Mass of colon (569 89) (K63 9)   23  Morbid obesity due to excess calories (278 01) (E66 01)   24  Obstructive sleep apnea (327 23) (G47 33)   25  Panic attack (300 01) (F41 0)   26  Pulmonary mass (786 6) (R91 8)   27  Pulmonary nodule (793 11) (R91 1)   28  Spinal stenosis (724 00) (M48 00)    Current Meds   1  Albuterol Sulfate (2 5 MG/3ML) 0 083% Inhalation Nebulization Solution; USE 1 UNIT   DOSE IN NEBULIZER EVERY 4 HOURS AS NEEDED; Therapy: 39BUX8225 to (Last Rx:67Skl8955) Ordered   2  Albuterol Sulfate (2 5 MG/3ML) 0 083% Inhalation Nebulization Solution; USE 1 UNIT   DOSE IN NEBULIZER EVERY 4 HOURS AS NEEDED; Therapy: 77LRJ2426 to (Last Rx:16Oct2017) Ordered   3   Baclofen 10 MG Oral Tablet; TAKE 1 TABLET TWICE DAILY  Requested for: 08XDY8107;   Last Rx:31Vbr2620 Ordered   4  Phuc Contour Test In Vitro Strip; USE ONE STRIP TO CHECK GLUCOSE THREE TIMES   DAILY; Therapy: 79Yek0561 to (Evaluate:42Fxa4879)  Requested for: 35TTX0813; Last   Rx:30Ywf1696 Ordered   5  Phuc Microlet Lancets Miscellaneous; USE AS DIRECTED; Therapy: 19TSI3275 to (Last Rx:66Bux7447) Ordered   6  Benzonatate 100 MG Oral Capsule; TAKE 1 CAPSULE TWICE DAILY AS NEEDED; Therapy: 11WKE3786 to (73 873 135)  Requested for: 73LAG5238; Last   Rx:02Ikz0384 Ordered   7  GlipiZIDE 10 MG Oral Tablet; TAKE TWO TABLETS BY MOUTH TWICE DAILY; Therapy: 61NRD1553 to (Evaluate:06Jun2017)  Requested for: 65Pmm4750; Last   Rx:65Gui5740 Ordered   8  GuaiFENesin 100 MG/5ML Oral Solution; TAKE 10 ML EVERY 4 HOURS AS DIRECTED; Therapy: 74YXD1633 to (45 543 135)  Requested for: 05AZQ1167; Last   Rx:48Qxu7173 Ordered   9  Isosorbide Mononitrate ER 30 MG Oral Tablet Extended Release 24 Hour; TAKE 1   TABLET ONCE DAILY  Requested for: 07NKY0968; Last Rx:36Kab6276 Ordered   10  Levemir FlexTouch 100 UNIT/ML Subcutaneous Solution Pen-injector; Inject 40 units    subcutaneously at bedtime as directed; Therapy: 13LAF9798 to (Evaluate:19Nov2017)  Requested for: 10GBH0291; Last    Rx:53Rjl8765 Ordered   11  Lisinopril 5 MG Oral Tablet; TAKE ONE TABLET BY MOUTH ONCE DAILY; Therapy: 34YCO2682 to (Evaluate:54Teb7362)  Requested for: 49HLZ1825; Last    Rx:14Jun2017 Ordered   12  Lovastatin 40 MG Oral Tablet; TAKE ONE TABLET BY MOUTH  DAILY IN THE EVENING; Therapy: 94ZFZ0678 to (Evaluate:63Ipu7158)  Requested for: 69HGB8702; Last    Rx:61Ziv4793 Ordered   13  MetFORMIN HCl  MG Oral Tablet Extended Release 24 Hour; take one tablet by    mouth twice daily; Therapy: 02HNO1135 to (Evaluate:08Wng4638)  Requested for: 45Qgs4321; Last    Rx:45Jwt0476 Ordered   14   Metoprolol Tartrate 25 MG Oral Tablet; 1/2 tablet daily; Therapy: 10SHR9961 to (Last Rx:26Jun2017); Status: ACTIVE - Retrospective By Protocol    Authorization Ordered   15  Nebulizer/Adult Mask KIT; use as directed; Therapy: 10POL7569 to (Last Rx:16Oct2017)  Requested for: 46OCF9797 Ordered   16  Omeprazole 20 MG Oral Capsule Delayed Release; TAKE 1 CAPSULE DAILY EVERY    MORNING BEFORE BREAKFAST; Therapy: (Rebeca Rodríguez) to Recorded   17  Ondansetron HCl - 8 MG Oral Tablet; Take one tablet every 8 hours as needed for    nausea; Therapy: 78UWO4973 to (Last Rx:19Jun2017)  Requested for: 67TCW4761; Status:    ACTIVE - Retrospective By Protocol Authorization Ordered   18  OxyCODONE HCl - 15 MG Oral Tablet; Take 1 tablet every 6-8 hours as needed for pain; Therapy: 53LKN5073 to (Evaluate:69Xej3315); Last Rx:94Mxb8593 Ordered   19  Pantoprazole Sodium 40 MG Oral Tablet Delayed Release (Protonix); TAKE ONE    TABLET BY MOUTH ONCE DAILY; Therapy: 11Aug2017 to (Evaluate:06Jun2018)  Requested for: 92LUE7065; Last    Rx:45Ckr2111 Ordered   20  PARoxetine HCl - 30 MG Oral Tablet (Paxil); TAKE ONE TABLET BY MOUTH ONCE    DAILY; Therapy: 71HWS0025 to (Evaluate:63Tto3736)  Requested for: 10Aug2017; Last    Rx:10Aug2017 Ordered   21  PredniSONE 10 MG Oral Tablet; TAKE 3 TABLETS DAILY FOR 2 DAYS, 2 TABLETS    DAILY FOR 2 DAYS AND 1 TABLET DAILY FOR 2 DAYS, THEN STOP; Therapy: 86IHJ3752 to (Last Rx:16Oct2017)  Requested for: 16Oct2017 Ordered   22  ProAir  (90 Base) MCG/ACT Inhalation Aerosol Solution; INHALE ONE TO TWO    PUFFS BY MOUTH EVERY 4 TO 6 HOURS AS NEEDED; Therapy: 83OAE5017 to (Evaluate:72Qxr3439)  Requested for: 37PWN9280; Last    Rx:23May2017 Ordered   23  ReliOn Short Pen Needles 31G X 8 MM Miscellaneous; USE AS DIRECTED ONCE    DAILY AT BEDTIME; Therapy: 42CHG4179 to (Angelic Rho)  Requested for: 15RDK7607; Last    Rx:23May2017 Ordered   24   Spiriva HandiHaler 18 MCG Inhalation Capsule; INHALE CONTENTS OF 1 CAPSULE    ONCE DAILY; Therapy: 67EJX1620 to (Last Rx:26Jun2017); Status: ACTIVE - Retrospective By Protocol    Authorization Ordered   25  Sure Comfort Lancets 30G Miscellaneous; USE AS DIRECTED; Last Rx:28Sep2015    Ordered   26  Tamsulosin HCl - 0 4 MG Oral Capsule; TAKE ONE CAPSULE BY MOUTH ONCE DAILY    IN THE MORNING; Therapy: 21Apr2015 to (Evaluate:77Fhg0155)  Requested for: 19Jsz3202; Last    Rx:10Aug2017 Ordered    Allergies    1  Penicillins   2  Tobramycin Sulfate in Saline SOLN    3  Latex   4   No Known Environmental Allergies    Signatures   Electronically signed by : Smitha Alas RN; Dec 13 2017 10:25AM EST                       (Author)
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
## 2018-02-19 ENCOUNTER — TELEPHONE (OUTPATIENT)
Dept: FAMILY MEDICINE CLINIC | Facility: CLINIC | Age: 58
End: 2018-02-19

## 2018-02-21 ENCOUNTER — TELEPHONE (OUTPATIENT)
Dept: FAMILY MEDICINE CLINIC | Facility: CLINIC | Age: 58
End: 2018-02-21

## 2024-07-02 ENCOUNTER — TELEPHONE (OUTPATIENT)
Dept: SURGERY | Facility: CLINIC | Age: 64
End: 2024-07-02

## (undated) DEVICE — BITE BLOCK ENDO 60FR ADLT MAXI  DISP W/STRAP

## (undated) DEVICE — SINGLE-USE BIOPSY FORCEPS: Brand: RADIAL JAW 4

## (undated) DEVICE — LUBRICANT SURGILUBE TUBE 4 OZ  FLIP TOP

## (undated) DEVICE — GLOVE EXAM NON-STRL NTRL PLUS LRG PF

## (undated) DEVICE — BRUSH ENDO CLEANING DBL-HEADER

## (undated) DEVICE — MEDI-VAC YANKAUER SUCTION HANDLE: Brand: CARDINAL HEALTH

## (undated) DEVICE — TRAP POLY

## (undated) DEVICE — "MB-142 MOUTHPIECE": Brand: MOUTHPIECE

## (undated) DEVICE — "MH-443 SUCTION VALVE F/EVIS140 EVIS160": Brand: SUCTION VALVE

## (undated) DEVICE — 60 ML SYRINGE,REGULAR TIP: Brand: MONOJECT

## (undated) DEVICE — "MAJ-901 WATER CONTAINER SET CV-160/140": Brand: WATER CONTAINER

## (undated) DEVICE — AIRLIFE™  ADULT CUSHION NASAL CANNULA WITH 7 FOOT (2.1 M) CRUSH-RESISTANT OXYGEN TUBING, AND U/CONNECT-IT ADAPTER: Brand: AIRLIFE™

## (undated) DEVICE — TUBING BUBBLE CLEAR 5MM X 100 FT NS

## (undated) DEVICE — DISPOSABLE BIOPSY VALVE MAJ-1555: Brand: SINGLE USE BIOPSY VALVE (STERILE)

## (undated) DEVICE — TUBING AUX CHANNEL

## (undated) DEVICE — "MH-438 A/W VLVE F/140 EVIS-140": Brand: AIR/WATER VALVE

## (undated) DEVICE — GAUZE SPONGES,16 PLY: Brand: CURITY

## (undated) DEVICE — SINGLE-USE POLYPECTOMY SNARE: Brand: SENSATION SHORT THROW

## (undated) DEVICE — SOLIDIFIER FLUID WASTE CONTROL 1500ML

## (undated) DEVICE — 1200CC GUARDIAN II: Brand: GUARDIAN

## (undated) DEVICE — TRAVELKIT CONTAINS FIRST STEP KIT (200ML EP-4 KIT) AND SOILED SCOPE BAG - 1 KIT: Brand: TRAVELKIT CONTAINS FIRST STEP KIT AND SOILED SCOPE BAG